# Patient Record
Sex: MALE | Employment: UNEMPLOYED | URBAN - METROPOLITAN AREA
[De-identification: names, ages, dates, MRNs, and addresses within clinical notes are randomized per-mention and may not be internally consistent; named-entity substitution may affect disease eponyms.]

---

## 2019-03-04 ENCOUNTER — DOCUMENTATION ONLY (OUTPATIENT)
Dept: CARE COORDINATION | Facility: CLINIC | Age: 54
End: 2019-03-04

## 2019-03-12 ENCOUNTER — TRANSFERRED RECORDS (OUTPATIENT)
Dept: HEALTH INFORMATION MANAGEMENT | Facility: CLINIC | Age: 54
End: 2019-03-12

## 2019-04-02 ENCOUNTER — TRANSFERRED RECORDS (OUTPATIENT)
Dept: HEALTH INFORMATION MANAGEMENT | Facility: CLINIC | Age: 54
End: 2019-04-02

## 2019-04-11 ENCOUNTER — TRANSFERRED RECORDS (OUTPATIENT)
Dept: HEALTH INFORMATION MANAGEMENT | Facility: CLINIC | Age: 54
End: 2019-04-11

## 2019-04-12 ENCOUNTER — TRANSFERRED RECORDS (OUTPATIENT)
Dept: HEALTH INFORMATION MANAGEMENT | Facility: CLINIC | Age: 54
End: 2019-04-12

## 2019-05-06 ENCOUNTER — OFFICE VISIT (OUTPATIENT)
Dept: INTERNAL MEDICINE | Facility: CLINIC | Age: 54
End: 2019-05-06
Payer: MEDICARE

## 2019-05-06 VITALS — SYSTOLIC BLOOD PRESSURE: 113 MMHG | HEART RATE: 72 BPM | DIASTOLIC BLOOD PRESSURE: 78 MMHG

## 2019-05-06 DIAGNOSIS — G91.8: ICD-10-CM

## 2019-05-06 DIAGNOSIS — G82.20 PARAPLEGIA (H): ICD-10-CM

## 2019-05-06 DIAGNOSIS — R53.81 MULTIFACTORIAL FUNCTIONAL IMPAIRMENT: ICD-10-CM

## 2019-05-06 DIAGNOSIS — Z86.19 HISTORY OF COCCIDIOIDOMYCOSIS: ICD-10-CM

## 2019-05-06 DIAGNOSIS — Z76.89 ENCOUNTER TO ESTABLISH CARE WITH NEW DOCTOR: Primary | ICD-10-CM

## 2019-05-06 ASSESSMENT — ENCOUNTER SYMPTOMS
DISTURBANCES IN COORDINATION: 0
NECK PAIN: 1
DYSURIA: 0
FEVER: 0
MUSCLE CRAMPS: 1
POLYPHAGIA: 0
MYALGIAS: 1
LOSS OF CONSCIOUSNESS: 0
SPEECH CHANGE: 0
HALLUCINATIONS: 0
FLANK PAIN: 0
NUMBNESS: 1
STIFFNESS: 1
POLYDIPSIA: 0
TREMORS: 0
BACK PAIN: 1
INCREASED ENERGY: 1
MUSCLE WEAKNESS: 1
HEADACHES: 0
WEIGHT GAIN: 0
CHILLS: 0
HEMATURIA: 0
SEIZURES: 0
WEAKNESS: 1
ALTERED TEMPERATURE REGULATION: 0
JOINT SWELLING: 0
DIZZINESS: 0
NIGHT SWEATS: 0
PARALYSIS: 1
MEMORY LOSS: 0
DIFFICULTY URINATING: 0
FATIGUE: 1
DECREASED APPETITE: 1
ARTHRALGIAS: 0
WEIGHT LOSS: 0
TINGLING: 1

## 2019-05-06 ASSESSMENT — PAIN SCALES - GENERAL: PAINLEVEL: SEVERE PAIN (7)

## 2019-05-06 NOTE — PROGRESS NOTES
CC:  Establish care/eval for motorized wheelchair    HPI:  Arnold is a 54 year old gentleman, whose primary care is at the [facility].  He formally served in the [employer] and remains VA-connected.  Per his report, the VA was not able to qualify him for a motorized wheelchair and advised him to seek primary care outside the [facility] for this concern. In addition to [facility]-care , he has Medicare for insurance.  His past medical history is notable for disseminated coccidioidomycosis prompting pronlonged hospitalization in the past [address] [place] [address]).  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  He also subsequently fractured his right femur during physical therapy.  This required surgical gale placement.  He has been using a manual wheelchair, however, he has developed progressive arm/shoulder/trunk weakness which makes it increasing difficult to use the manual wheelchair.      Answers for HPI/ROS submitted by the patient on 5/6/2019   General Symptoms: Yes  Skin Symptoms: No  HENT Symptoms: No  EYE SYMPTOMS: No  HEART SYMPTOMS: No  LUNG SYMPTOMS: No  INTESTINAL SYMPTOMS: No  URINARY SYMPTOMS: Yes  REPRODUCTIVE SYMPTOMS: No  SKELETAL SYMPTOMS: Yes  BLOOD SYMPTOMS: No  NERVOUS SYSTEM SYMPTOMS: Yes  MENTAL HEALTH SYMPTOMS: No  Fever: No  Loss of appetite: Yes  Weight loss: No  Weight gain: No  Fatigue: Yes  Night sweats: No  Chills: No  Increased stress: Yes  Excessive hunger: No  Excessive thirst: No  Feeling hot or cold when others believe the temperature is normal: No  Loss of height: No  Post-operative complications: No  Surgical site pain: No  Hallucinations: No  Change in or Loss of Energy: Yes  Hyperactivity: No  Confusion: No  Trouble holding urine or incontinence: Yes  Pain or burning: No  Trouble starting or stopping: No  Increased frequency of urination: No  Blood in urine: No  Decreased frequency of urination:  No  Frequent nighttime urination: No  Flank pain: No  Difficulty emptying bladder: No  Back pain: Yes  Muscle aches: Yes  Neck pain: Yes  Swollen joints: No  Joint pain: No  Bone pain: No  Muscle cramps: Yes  Muscle weakness: Yes  Joint stiffness: Yes  Bone fracture: No  Trouble with coordination: No  Dizziness or trouble with balance: No  Fainting or black-out spells: No  Memory loss: No  Headache: No  Seizures: No  Speech problems: No  Tingling: Yes  Tremor: No  Weakness: Yes  Difficulty walking: Yes  Paralysis: Yes  Numbness: Yes      Patient Active Problem List   Diagnosis     Disseminated coccidioidomycosis     Chronic meningitis     Chronic osteomyelitis, osteomyelitis of unspecified site     Cauda equina syndrome (H)     Depression with anxiety     Psychophysiological insomnia     Sacral pressure sore, unspecified pressure ulcer stage     Hydrocephalus     Low back pain without sciatica, unspecified back pain laterality     Benign non-nodular prostatic hyperplasia without lower urinary tract symptoms     Opioid dependence with opioid-induced disorder (H)     Paraplegia (H)     Pressure ulcer of left buttock, stage 4 (H)     Past medical history  Problem Noted Date   Decubitus ulcer of coccyx, unspecified pressure ulcer stage 02/24/2016   Thrombus 02/22/2016   Deep vein thrombosis (DVT), left 02/20/2016   Cellulitis of leg, right 02/19/2016   Closed displaced fracture of epiphysis of right femur with nonunion 10/16/2015   Coccidioidomycosis 08/31/2015   Paraplegia 08/31/2015   Paralysis 05/04/2015   Major depression, recurrent 05/04/2015   Pressure ulcer 05/04/2015   Cauda equina syndrome with neurogenic bladder 05/04/2015   Multifactorial functional impairment 05/04/2015   Sepsis 05/04/2015   Severe protein-calorie malnutrition 05/04/2015   Osteomyelitis 05/04/2015   Pyelonephritis 05/04/2015     Other past medical history:  Problem Noted Date   Osteomyelitis of sacrum 03/16/2015   Sepsis 03/13/2015    Malnutrition 03/12/2015   Overview:     Malnutrition, severe protein and calorie     SIRS (systemic inflammatory response syndrome) 03/12/2015   Low back pain 10/06/2014   Palliative care encounter 10/06/2014   Overview:     Palliative care encounter-Pain     Major depressive disorder, recurrent 10/05/2014   Cachexia 10/05/2014   Mild protein-calorie malnutrition 10/05/2014   Acute pyelonephritis 10/03/2014   Overview:     Acute pyelonephritis, Left, Probable     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 On lifelong   antifungal treatment. Neurogenic bowel and bladder.        Cauda equina syndrome 09/30/2014   Flaccid paraplegia 09/30/2014   Pressure ulcer,stage IV, down to muscle or bone 09/30/2014   Overview:     ischium       Pressure sore on heel, left, unstageable 09/30/2014   Overview:     Pressure sore on heels bilaterally     Pressure sore on heel, right, unstageable 09/30/2014   Abdominal pain 09/30/2014   Stage 4 pressure ulcer 08/01/2014   Overview:     Stage 4 pressure ulcer of the sacrum and the left ischial tuberosity     Chronic abdominal pain 08/01/2014   Cauda equina syndrome with neurogenic bladder 08/01/2014   Neurogenic bowel 08/01/2014   Systemic fungal infection 08/01/2014   Chronic brain-hydrocephalus syndrome 08/01/2014   Spasticity 08/01/2014   Depression 08/01/2014   Anemia, chronic disease 08/01/2014   Spastic paraplegia 08/01/2014       Surgical History  Surgery Date Site/Laterality Comments   IM FIONA FEMUR 10/18/2015 Leg Upper/Right Procedure: IM FIONA FEMUR; Laterality: Right; Surgeon: Asa Talley MD; Service: Orthopedics    Medical devices from this surgery are in the Implants section.     Other surgical history:  Surgery Date  Laterality Comments   ABDOMEN SURGERY         GASTROSTOMY TUBE PLACEMENT         BRAIN SURGERY         VENTRICULOPERITONEAL SHUNT        Family History:  Medical History Relation Name Comments   Cancer, Lung Birth Father       Cataract Negative Family History       Glaucoma Negative Family History       Macular Degeneration Negative Family History       Retinal Detachment Negative Family History       Mother diabetes  1 brother  1 sister      Relation Name Status Comments   Birth Father        Birth Mother   Alive        Social History:  4 children, 3 grandkids,   Formally employed as excecutive admin support for [employer]  No pork or red meat  Served in the [employer], 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    Never Smoker             Alcohol Use Drinks/Week oz/Week Comments   No        Current Outpatient Medications   Medication Sig Dispense Refill     gabapentin (NEURONTIN) 400 MG capsule Take 1 capsule (400 mg) by mouth 2 times daily 90 capsule      oxyCODONE HCl (OXAYDO) 5 MG TABA Take 10 mg by mouth 3 times daily       VITAMIN D, CHOLECALCIFEROL, PO Take 1,000 Units by mouth daily          No Known Allergies  /78   Pulse 72     Gen:  Pleasant, NAD, in manual wheelchair  Musculoskeletal/Neuro exam:     strength intact.  Biceps, triceps and shoulder abduction strength are difficult to fully assess:  He can attempt the testing and has decent strength, however, he has to lean to the side of testing and loses trunk stability readily when testing.  Light touch and vibratory sensation are intact in the UE's  and Reflexes 1+ upper extremities.  Absent ability to move bilateral LE's.  I could not elicit reflexes in the LE's.      Arnold was seen today for establish care.    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  Increasing trunk  instability and upper extremity weakness   -     PHYSICAL THERAPY REFERRAL for mobility assessment for motorized wheelchair.    Sylvie Peralta M.D.  Internal Medicine  [facility]   pager [phone]

## 2019-07-08 ENCOUNTER — OFFICE VISIT (OUTPATIENT)
Dept: NEUROLOGY | Facility: CLINIC | Age: 54
End: 2019-07-08
Payer: MEDICARE

## 2019-07-08 DIAGNOSIS — G82.20 PARAPLEGIA (H): Primary | ICD-10-CM

## 2019-07-08 NOTE — LETTER
7/8/2019       RE: Arnold Duke  [address]  [facility] [address]     Dear Colleague,    Thank you for referring your patient, Arnold Duke, to the [facility] EMG at [facility]. Please see a copy of my visit note below.     [facility]  Electrodiagnostic Laboratory    Nerve Conduction & EMG Report          Patient: Arnold Duke YOB: 1965  Patient ID: [id number] Age: 54 Years 5 Months  Gender: Male      History & Examination:  Mr. Duke is scheduled for bilateral lower extremity EM[facility] [place] to assess whether there is any motor neuron function in the setting of paraplegia that would suggest he is a candidate for spinal stimulator.    The patient reports that he sustained a spinal cord injury and cauda equina injury related to a disseminated coccidioidomycosis infection.    On examination the patient is observed to have no movement in the lower extremities.  He is diffusely atrophic in the lower extremities and reports some altered sensations in the lower extremities but it is unreliable on testing.    Techniques: Motor conduction studies were done with surface recording electrodes. Sensory conduction studies were performed with surface electrodes. Temperature was monitored and recorded throughout the study. Lower extremities were maintained at a temperature of 31degrees Centigrade or higher. EMG was done with a concentric needle electrode.        Results:  Sensory and motor nerve conduction studies were attempted in the bilateral lower extremities with no response elicited from any of the studies.    Needle examination was performed.  In the left tibialis anterior there was increased insertional activity with 2+ fibrillations present.  There was no ability for the patient to activate this muscle.  In the left vastus lateralis patient had 1+ fibrillation potentials with a CRD present and  again no ability to activate muscles for insertional activity.  Left gastrocnemius and iliopsoas showed normal insertional activity with no activation.    Interpretation:  This is an abnormal study. Sensory and motor nerve conduction studies today elicited no response.  With needle examination there was some minimal insertional abnormalities with the patient was unable to activate any muscles.  Whether this is representative of severe lower motor neuron injury only or represents additional central nervous system injury and inability to activate musculature is unclear and clinical correlation would be recommended.  Per the clinical question at hand for the EMG I do not appreciate any significant motor neuron function on this examination.      EMG Physician:  Grace Kraft MD FAAN    Sensory NCS      Nerve / Sites Rec. Site Onset Peak NP Amp Ref. PP Amp Dist Salvatore Ref. Temp     ms ms  V  V  V cm m/s m/s  C   L SURAL - Lat Mall      Calf Ankle NR NR NR 8.0 NR 14 NR 38.0 31.1   R SURAL - Lat Mall      Calf Ankle NR NR NR 8.0 NR 14 NR 38.0 31.4   R SUP PERONEAL      Lat Leg Patel NR NR NR  NR 12.5 NR 38.0 31.4       Motor NCS      Nerve / Sites Rec. Site Lat Ref. Amp Ref. Rel Amp Dist Salvatore Dur. Area Temp.     ms ms mV mV % cm m/s ms %  C   R DEEP PERONEAL - EDB      Ankle EDB NR 6.00 NR 2.5 NR 8  NR NR 31.4   L DEEP PERONEAL - EDB      Ankle EDB NR 6.00 NR 2.5 NR 8  NR NR 31.1   R TIBIAL - AH      Ankle AH NR 6.00 NR 4.0 NR 8  NR NR 23.8   L TIBIAL - AH      Ankle AH NR 6.00 NR 4.0 NR 8  NR NR 23.8   R PERONEAL - Tib Ant      Fib Head Tib Ant NR  NR  NR   NR NR 23.8       EMG Summary Table     Spontaneous MUAP Recruitment    IA Fib/PSW Fasc H.F. Amp Dur. PPP Pattern   L. TIB ANTERIOR Increased 2+ None None - - - None   L. GASTROCN (MED) N None None None - - - None   L. VAST LATERALIS N 1+ None CRD - - - None   L. ILIOPSOAS N None None None - - - None                           History & Examination:      Techniques:  Sensory  and motor conduction studies were done with surface recording electrodes. EMG was done with a concentric needle electrode.     Results:      Interpretation:        EMG Physician:               Again, thank you for allowing me to participate in the care of your patient.      Sincerely,    Grace Kraft MD

## 2019-07-08 NOTE — PROGRESS NOTES
[facility]  Electrodiagnostic Laboratory    Nerve Conduction & EMG Report          Patient: Arnold Duke YOB: 1965  Patient ID: [id number] Age: 54 Years 5 Months  Gender: Male      History & Examination:  Mr. Duke is scheduled for bilateral lower extremity EMG [facility] [place] to assess whether there is any motor neuron function in the setting of paraplegia that would suggest he is a candidate for spinal stimulator.    The patient reports that he sustained a spinal cord injury and cauda equina injury related to a disseminated coccidioidomycosis infection.    On examination the patient is observed to have no movement in the lower extremities.  He is diffusely atrophic in the lower extremities and reports some altered sensations in the lower extremities but it is unreliable on testing.    Techniques: Motor conduction studies were done with surface recording electrodes. Sensory conduction studies were performed with surface electrodes. Temperature was monitored and recorded throughout the study. Lower extremities were maintained at a temperature of 31degrees Centigrade or higher. EMG was done with a concentric needle electrode.        Results:  Sensory and motor nerve conduction studies were attempted in the bilateral lower extremities with no response elicited from any of the studies.    Needle examination was performed.  In the left tibialis anterior there was increased insertional activity with 2+ fibrillations present.  There was no ability for the patient to activate this muscle.  In the left vastus lateralis patient had 1+ fibrillation potentials with a CRD present and again no ability to activate muscles for insertional activity.  Left gastrocnemius and iliopsoas showed normal insertional activity with no activation.    Interpretation:  This is an abnormal study. Sensory and motor nerve conduction studies today elicited no response.   With needle examination there was some minimal insertional abnormalities with the patient was unable to activate any muscles.  Whether this is representative of severe lower motor neuron injury only or represents additional central nervous system injury and inability to activate musculature is unclear and clinical correlation would be recommended.  Per the clinical question at hand for the EMG I do not appreciate any significant motor neuron function on this examination.      EMG Physician:  Grace Kraft MD FAAN    Sensory NCS      Nerve / Sites Rec. Site Onset Peak NP Amp Ref. PP Amp Dist Salvatore Ref. Temp     ms ms  V  V  V cm m/s m/s  C   L SURAL - Lat Mall      Calf Ankle NR NR NR 8.0 NR 14 NR 38.0 31.1   R SURAL - Lat Mall      Calf Ankle NR NR NR 8.0 NR 14 NR 38.0 31.4   R SUP PERONEAL      Lat Leg Patel NR NR NR  NR 12.5 NR 38.0 31.4       Motor NCS      Nerve / Sites Rec. Site Lat Ref. Amp Ref. Rel Amp Dist Salvatore Dur. Area Temp.     ms ms mV mV % cm m/s ms %  C   R DEEP PERONEAL - EDB      Ankle EDB NR 6.00 NR 2.5 NR 8  NR NR 31.4   L DEEP PERONEAL - EDB      Ankle EDB NR 6.00 NR 2.5 NR 8  NR NR 31.1   R TIBIAL - AH      Ankle AH NR 6.00 NR 4.0 NR 8  NR NR 23.8   L TIBIAL - AH      Ankle AH NR 6.00 NR 4.0 NR 8  NR NR 23.8   R PERONEAL - Tib Ant      Fib Head Tib Ant NR  NR  NR   NR NR 23.8       EMG Summary Table     Spontaneous MUAP Recruitment    IA Fib/PSW Fasc H.F. Amp Dur. PPP Pattern   L. TIB ANTERIOR Increased 2+ None None - - - None   L. GASTROCN (MED) N None None None - - - None   L. VAST LATERALIS N 1+ None CRD - - - None   L. ILIOPSOAS N None None None - - - None                           History & Examination:      Techniques:  Sensory and motor conduction studies were done with surface recording electrodes. EMG was done with a concentric needle electrode.     Results:      Interpretation:        EMG Physician:

## 2019-07-31 ENCOUNTER — HOSPITAL ENCOUNTER (OUTPATIENT)
Dept: OCCUPATIONAL THERAPY | Facility: CLINIC | Age: 54
Setting detail: THERAPIES SERIES
End: 2019-07-31
Attending: INTERNAL MEDICINE
Payer: MEDICARE

## 2019-07-31 PROCEDURE — 97542 WHEELCHAIR MNGMENT TRAINING: CPT | Mod: GO | Performed by: OCCUPATIONAL THERAPIST

## 2019-07-31 NOTE — PROGRESS NOTES
"   SEATING AND WHEELED MOBILITY ASSESSMENT  07/31/19 1400   Quick Adds   Quick Adds Certification;Current Manual Wheelchair   General Information    Rehab Discipline OT   Funding Medicare/MA   Service Outpatient;Occupational Therapy;Seating/Wheeled Mobility Evaluation   Height 5'9\"   Weight 170   Start Of Care Date 07/31/19   Referring Physician Sylvie Edmondson   Orders Per Therapist Evaluation;Evaluate And Treat As Indicated   Orders Date 05/06/19   Patient/Caregiver Goals Power mobility   Rehabilitation Technology Supplier Joel ALEXANDER from [facility]   Current Community Support Personal Care Attendant;Family/Friend Caregiver;Transportation Service;Therapy Services  (5 hrs/wk, PT and pain mgmt)   Patient role/Employment history Disabled  (will soon )   General Information [facility]   Fall Risk Screen   Fall screen completed by OT   Have you fallen 2 or more times in the past year? No   Is patient a fall risk? Yes   Medical History   Onset Of Illness/injury Or Date Of Surgery 5/6/19  (order)   Medical Diagnosis Coccidioidomycosis.  (valley fever) with resulting paraplegia   Medical History Stage 4 pressure wounds with bilateral flap surgeries, hydrocephalus, depression, anxiety   Recent or Planned Surgeries flap surgeries   Current Manual Wheelchair   Age 3    Ti lite   Cushion Air Filled  (star)   Wheelchair Back Solid Curved   Footrest Style center mount   Headrest No   Settings Used Home;Outdoor Community Mobility;Primary Means of Transportation   Condition Good   Rationale for Equipment Changes Patient now requires power mobility due to upper extremity strain from long term power chair use.   Home Accessibility   Living Environment House   Primary Entrance Level;Elevator   All Rooms Wheelchair Accessible Yes   Community ADL   Transportation Transportation Services   Community Mobility Requirements Medical Appointments;Shopping   Cognitive/Visual/Hearing Status   Observations " No Problems Observed During Evaluation   Vision Intact;Corrective Lenses   ADL Status   Feeding Independent   Grooming/Hygiene Independent   Dressing Independent   Toileting Independent   Bathing Independent  (shower chair tub slide system)   Meal Preparation Requires Assist   Home Management Requires Assist   ADL Comments Standing frame- every day   Fatigue   Fatigue Measure Score Increased fatigue with longer distances and full time chair use   Balance   Unsupported Sitting Balance Within Functional Limits   Sitting Balance in Chair Uses Upper Extremities for Balance   Ambulation   Ambulation Non Ambulatory   Transfers   Transfer Assist Independent   Transfer Method Squat Pivot/Scoot Boost   Sleep/Rest   Sleep Surface/Equipment Adjustable hospital bed   Wheelchair Ability   Wheelchair Ability Quick Adds Manual Chair;Wheelchair Use   Manual Wheelchair Propulsion   Manual Wheelchair Propulsion Independent   Comments increased pain and fatigued with long term propulsion   Wheelchair Use   Ability to Perform Weight Shifts Independent   Bed Confined Without Wheelchair? Yes   Hours in Wheelchair Daily 5   Hours Spent Alone Daily 24   Neuromuscular   History of Pressure Sores Yes   Pain Yes   Pain Location waist down   Pain Scale  6  (pain psychology, stimulator placement)   Coordination UE Intact;LE Impaired   Tone Spasticity   Sensory Deficits Reported T5 down impaired and worse towards feet   Sensation on Seating Surface Impaired per Report   Head and Neck   Head and Neck Position Functional   Upper Extremities   UE ROM Full ROM   UE Strength 4+/5   Dominance Right   UE Comments Pain and fatigue with shoulders with use of manual chair   Pelvis   Anterior/Posterior Pelvis Position Posterior Tilt;Partially Flexible   Pelvic Obliquity Left Elevation   Trunk   Anterior/Posterior Trunk Position Increased Lumbar Lordosis   Left-Right Trunk Position S Curve   Lower Extremities   LE ROM Full passive    LE Strength 0/5   Foot  Positioning Plantar flexed   Patient Measurements   Other see atp notes   Education Assessment   Barriers to Learning Emotional   Preferred Learning Style Listening;Demonstration   Assessment/Plan   Criteria for Skilled Interventions Met Yes, Treatment Indicated   Treatment Diagnosis impaired participation in MRADLS   Therapy Frequency once   Planned Therapy Interventions Wheelchair Management/Propulsion Training   Planned Therapy Interventions Comments Educated patient of power mobility and scooter and power assist vs power wheelchair with tilt to manage trunk stability and wounds.  Patient wanting to trial power w/c with smallest based possible   Risks and benefits of treatment have been explained Yes   Patient/family & other staff in agreement with plan of care Yes   Comments Trials at [facility] of power chair.   Session Time   OT Wheelchair Management Minutes (54516) 75   Certification   Certification date from 07/31/19   Certification date to 07/31/19   Adult OT Eval Goals   OT Eval Goals (Adult) 1    OT Goal 1   Goal Identifier power w/c   Goal Description Patient to demonstrate a successful home trial with the recommended equipment   Target Date 07/31/19   Date Met 07/31/19   Electronically signed by:  Mely TREVINO/L, ATP      Occupational Therapist, Assistive   [phone]      fax: [phone]      [email or web address], [address]  [address]  [place] [address]

## 2021-09-07 ENCOUNTER — OFFICE VISIT (OUTPATIENT)
Dept: INTERNAL MEDICINE | Facility: CLINIC | Age: 56
End: 2021-09-07
Payer: MEDICARE

## 2021-09-07 VITALS — SYSTOLIC BLOOD PRESSURE: 115 MMHG | DIASTOLIC BLOOD PRESSURE: 74 MMHG | HEART RATE: 66 BPM

## 2021-09-07 DIAGNOSIS — G82.20 PARAPLEGIA (H): Primary | ICD-10-CM

## 2021-09-07 PROCEDURE — 99213 OFFICE O/P EST LOW 20 MIN: CPT | Mod: GC

## 2021-09-07 ASSESSMENT — PAIN SCALES - GENERAL: PAINLEVEL: NO PAIN (0)

## 2021-09-07 NOTE — PROGRESS NOTES
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 He has been using a manual wheelchair since, was referred for motorized wheelchair but found it to be bulky. He presents to clinic for referral for physical therapy of his lower extremities. He has no concerns today and states that he wants to continue working on his remaining strength in his lower extremities. He denies chest pain, dyspnea, abdominal pain, diarrhea, constipation, and nausea.    Medications and allergies reviewed by me today.     ROS:   Constitutional, neuro, ENT, endocrine, pulmonary, cardiac, gastrointestinal, genitourinary, musculoskeletal, integument and psychiatric systems are negative, except as otherwise noted.    OBJECTIVE:    /74   Pulse 66    Wt Readings from Last 1 Encounters:   10/14/16 69.2 kg (152 lb 9.6 oz)       GENERAL APPEARANCE: healthy, alert and no distress. Seated in wheelchair     EYES: EOMI,  PERRL     HENT: ear canals and TM's normal and nose and mouth without ulcers or lesions     NECK: no adenopathy, no asymmetry, masses, or scars and thyroid normal to palpation     RESP: lungs clear to auscultation - no rales, rhonchi or wheezes     CV: regular rates and rhythm, normal S1 S2, no S3 or S4 and no murmur, click or rub     ABDOMEN:  soft, nontender, no HSM or masses and bowel sounds normal     MS: upper extremities normal- no gross deformities noted, no evidence of inflammation in joints, FROM in all extremities. Lower extremities with near-flaccid paralysis     SKIN: no suspicious lesions or rashes     NEURO: Normal strength and tone in upper extremities, decreased in lower  extremities, decreased sensory exam in lower extremities, mentation intact and speech normal     PSYCH: mentation appears normal. and affect normal/bright     LYMPHATICS: No cervical adenopathy     ASSESSMENT/PLAN:  Arnold Duke is a 56 year old male with a past medical history is notable for disseminated coccidioidomycosis prompting pronlonged hospitalization in the past (2006, [place]). Presents to clinic for physical therapy referral.     Paraplegia (H)  - Physical therapy referral    Diego Swain Jr., MD  Internal Medicine Resident, PGY-1  [facility]  Sep 7, 2021    Pt was seen and plan of care discussed with Dr. Peralta

## 2021-09-07 NOTE — NURSING NOTE
Chief Complaint   Patient presents with     Referral     PT referral       Mike Boyle CMA ([facility]) at 9:21 AM on 9/7/2021

## 2021-09-13 ENCOUNTER — HOSPITAL ENCOUNTER (OUTPATIENT)
Dept: PHYSICAL THERAPY | Facility: CLINIC | Age: 56
Setting detail: THERAPIES SERIES
End: 2021-09-13
Attending: INTERNAL MEDICINE
Payer: MEDICARE

## 2021-09-13 DIAGNOSIS — G82.20 PARAPLEGIA (H): ICD-10-CM

## 2021-09-13 PROCEDURE — 97161 PT EVAL LOW COMPLEX 20 MIN: CPT | Mod: GP | Performed by: PHYSICAL THERAPIST

## 2021-09-13 PROCEDURE — 97110 THERAPEUTIC EXERCISES: CPT | Mod: GP | Performed by: PHYSICAL THERAPIST

## 2021-09-13 NOTE — PROGRESS NOTES
"   09/13/21 1200   Quick Adds   Quick Adds Certification   Type of Visit Initial OP PT Evaluation   General Information   Start of Care Date 09/13/21   Referring Physician Diego Swain Jr., MD - resident; Vivian Silva MD   Orders Evaluate and Treat as Indicated   Order Date 09/07/21   Medical Diagnosis Paraplegia (H) G82.20   Onset of illness/injury or Date of Surgery 09/07/21  (Referral date used)   Precautions/Limitations fall precautions   Surgical/Medical history reviewed Yes   Pertinent history of current problem (include personal factors and/or comorbidities that impact the POC) Per chart review: 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 He has been using primarily a manual wheelchair since 2013/2014, was referred for motorized wheelchair but found it to be bulky.\" He would like to continue working on [employer] strength. Patient notes he is able to stand with his walker and he has a standing frame that he uses daily for about 1 hour per day cummulatively. Patient is able to transfer independently. Patient notes he can standing and walking a few steps with his walker, but not a lot for safety. Patient denies falls. He notes some increase in weakness lately, wants to also maintain what he has. Patient uses dumbbells for his arms. Patient denies pain or falls.   Pertinent Visual History  Glasses   Prior level of function comment Patient was previously IND with all functional mobility and ADLs with no AD use. Currently he is modified independent with use of a manual wheelchair for primary mobility. Patient notes he has some core instability with showering - missing the back rest of the bench. Drives with hand control modification. Patient notes some difficulty with core control when turning in " the car. He is able to get the wheelchair in his car by dismantling it.   Current Community Support Family/friend caregiver   Patient role/Employment history Disabled   Living environment Apartment/condo   Home/Community Accessibility Comments Lives alone in a 4th floor apartment with elevator access   Current Assistive Devices Manual Wheel Chair  (2014)   Assistive Devices Comments Has a hospital bed but does not use it, has a walker and standing frame   Patient/Family Goals Statement Improve strength in legs and core   Fall Risk Screen   Fall screen completed by PT   Have you fallen 2 or more times in the past year? No   Have you fallen and had an injury in the past year? No   Timed Up and Go score (seconds) NT, nonambulatory   Is patient a fall risk? Yes   Pain   Patient currently in pain No   Vitals Signs   Heart Rate 72   SpO2 99   Blood Pressure 121/79   Vital Signs Comments Seated, resting, left arm   Cognitive Status Examination   Orientation orientation to person, place and time   Level of Consciousness alert   Follows Commands and Answers Questions 100% of the time;able to follow multistep instructions   Personal Safety and Judgment intact   Integumentary   Integumentary No deficits were identified   Posture   Posture Forward head position   Posture Comments Sits slumped with left trunk sidebend for balance without back support   Strength   Manual Muscle Testing Quick Adds MMT: Hip;MMT: Knee;MMT: Ankle   Strength Comments MMT performed in sitting   MMT: Hip, Rehab Eval   Hip Flexion - Left Side (1/5) trace, left   Hip ABduction - Left Side (1/5) trace, left   Hip ADduction - Left Side (1/5) trace, left   Hip Flexion - Right Side (1/5) trace, right   Hip ABduction - Right Side (1/5) trace, right   Hip ADduction - Right Side (1/5) trace, right   MMT: Knee, Rehab Eval   Knee Flexion - Left Side (1/5) trace, left   Knee Extension - Left Side (1/5) trace, left   Knee Flexion - Right Side (0/5) zero, right    Knee Extension - Right Side (1/5) trace, right   MMT: Ankle, Rehab Eval   Ankle Dorsiflexion - Left Side (0/5) zero, left   Ankle Plantarflexion - Left Side (0/5) zero, left   Ankle Dorsiflexion - Right Side (0/5) zero, right   Ankle Plantarflexion - Right Side (0/5) zero, right   Bed Mobility   Bed Mobility Comments IND   Transfer Skills   Transfer Comments Able to perform a scoot transfer independently, able to toilet independently   Locomotion   Wheel Chair Mobility Not impaired   Wheel Chair Mobility Comments Mod I   Gait   Gait Comments Non ambulatory; able to take some small steps per patient but largely non LE weight bearing with standing in the parallel bars today   Balance   Balance Comments Sitting balance limited with shift of his COM over his COREY, notes he frequently falls back into bed if he pushes his balance too far; Function in sitting test (FIST): 25/56 with LOB on lateral nudge and picking up an object from behind. Max assist for lifting his foot. Did not attempt forward reach or  object from the floor; Patient able to maintain standing posture with primarily B UE support and limited weight bearing through his feet, poor hip and knee extension improved with therapist bilateraly knee block and facilitation of hip extension. Able to hold for about 20 seconds to UE fatigue, notes quad fatigue bilaterally after standing.    Sensory Examination   Sensory Perception Comments Tingling in his legs from the upper thigh down to his toes, symmetrical, sensation intact but different per patient report.    Modality Interventions   Planned Modality Interventions Comments Per therapist discretion   Planned Therapy Interventions   Planned Therapy Interventions balance training;ADL retraining;gait training;neuromuscular re-education;motor coordination training;orthotic fitting/training;ROM;strengthening;stretching;transfer training   Clinical Impression   Criteria for Skilled Therapeutic Interventions Met  yes, treatment indicated   PT Diagnosis Impaired core strength and sitting balance   Influenced by the following impairments Impaired core and LE strength, impaired sitting balance, PMH, impaired activity tolerance   Functional limitations due to impairments Increased risk for falls per the FIST, impaired safety and independence with sitting balance, turning the steering wheel, and sitting without back support due to impaired core strength and sitting balance   Clinical Presentation Stable/Uncomplicated   Clinical Presentation Rationale Medically stable but complex PMH    Clinical Decision Making (Complexity) Low complexity   Therapy Frequency 1 time/week  (Decreasing in frequency as indicated)   Predicted Duration of Therapy Intervention (days/wks) 90 days   Risk & Benefits of therapy have been explained Yes   Patient, Family & other staff in agreement with plan of care Yes   Clinical Impression Comments Patient is a 56 year old male with a PMH of disseminated coccidioidomycosis and resulting paraplegia. Patient is primarily manual wheelchair dependent and completes scoot transfers independently. He demonstrates impaired core strength and sitting balance, especially without back support or when needing to apply force through his arms such as when turning a car steering wheel. He may benefit from skilled physical therapy to decrease his risk for falls, increase his LE strength, and increase his safety and independence with functional mobility and ADLs.    GOALS   PT Eval Goals 1;2;3;4   Goal 1   Goal Identifier HEP   Goal Description Patient will demonstrate understanding and compliance to his HEP for continued wellbeing upon discharge from skilled physical therapy.   Target Date 12/11/21   Goal 2   Goal Identifier Function in sitting test   Goal Description Patient will complete the Function In Sitting Test with a score of 32/56 to demonstrate improved safety and independence with sitting balance tasks.   Goal  Progress Baseline 25/56   Target Date 12/11/21   Goal 3   Goal Identifier Car steering wheel   Goal Description Patient will demonstrate ability to maintain upright posture and core stabilization to turn the steering wheel in his car for safety and independence with driving.    Goal Progress Basline difficulty maintaining core stabilization with turning the steering wheel   Target Date 12/11/21   Goal 4   Goal Identifier Standing   Goal Description Patient will maintain standing with B UE support and weight supported on B LEs for 2 minutes to demonstrate improved strength and upright standing tolerance.    Goal Progress Basline about 20 seconds with B knee block   Target Date 12/11/21   Total Evaluation Time   PT Eval, Low Complexity Minutes (67388) 35   Therapy Certification   Certification date from 09/13/21   Certification date to 12/11/21   Medical Diagnosis Paraplegia (H) G82.20   Certification I certify the need for these services furnished under this plan of treatment and while under my care.  (Physician co-signature of this document indicates review and certification of the therapy plan).

## 2021-09-13 NOTE — PROGRESS NOTES
Rehabilitation Services        OUTPATIENT PHYSICAL THERAPY FUNCTIONAL EVALUATION  PLAN OF TREATMENT FOR OUTPATIENT REHABILITATION  (COMPLETE FOR INITIAL CLAIMS ONLY)  Patient's Last Name, First Name, M.I.  YOB: 1965  Arnold Duke  DONATO     Provider's Name   Vidhya Mancini PT   Medical Record No.  [id number]     Start of Care Date:  09/13/21   Onset Date:  09/07/21 (Referral date used)   Type:     _X__PT   ____OT  ____SLP Medical Diagnosis:  Paraplegia (H) G82.20     PT Diagnosis:  Impaired core strength and sitting balance Visits from SOC:  1                              __________________________________________________________________________________  Plan of Treatment/Functional Goals:  balance training, ADL retraining, gait training, neuromuscular re-education, motor coordination training, orthotic fitting/training, ROM, strengthening, stretching, transfer training           GOALS  HEP  Patient will demonstrate understanding and compliance to his HEP for continued wellbeing upon discharge from skilled physical therapy.  12/11/21    Function in sitting test  Patient will complete the Function In Sitting Test with a score of 32/56 to demonstrate improved safety and independence with sitting balance tasks.  12/11/21    Car steering wheel  Patient will demonstrate ability to maintain upright posture and core stabilization to turn the steering wheel in his car for safety and independence with driving.   12/11/21    Standing  Patient will maintain standing with B UE support and weight supported on B LEs for 2 minutes to demonstrate improved strength and upright standing tolerance.   12/11/21                                                Therapy Frequency:  1 time/week (Decreasing in frequency as indicated)   Predicted Duration of Therapy Intervention:  90 days    Vidhya Mancini, PT, DPT                                    I CERTIFY THE NEED FOR THESE SERVICES FURNISHED UNDER        THIS PLAN OF TREATMENT AND WHILE UNDER MY CARE     (Physician co-signature of this document indicates review and certification of the therapy plan).                Certification Date From:  09/13/21   Certification Date To:  12/11/21    Referring Provider:  Diego Swain Jr., MD - resident; Vivian Silva MD    Initial Assessment  See Epic Evaluation- Start of Care Date: 09/13/21

## 2021-09-23 ENCOUNTER — HOSPITAL ENCOUNTER (OUTPATIENT)
Dept: PHYSICAL THERAPY | Facility: CLINIC | Age: 56
Setting detail: THERAPIES SERIES
End: 2021-09-23
Payer: MEDICARE

## 2021-09-23 ENCOUNTER — TELEPHONE (OUTPATIENT)
Dept: INTERNAL MEDICINE | Facility: CLINIC | Age: 56
End: 2021-09-23

## 2021-09-23 PROCEDURE — 97110 THERAPEUTIC EXERCISES: CPT | Mod: GP | Performed by: PHYSICAL THERAPIST

## 2021-09-23 NOTE — TELEPHONE ENCOUNTER
[facility] Call Center    Phone Message    May a detailed message be left on voicemail: yes     Reason for Call: Other: Pt requesting call back to discuss getting a handicap placard for his vehicle     Action Taken: Message routed to:  [facility] naveen

## 2021-09-29 NOTE — TELEPHONE ENCOUNTER
I called and left message.  willing to sign. He can get the form online and mail or drop off once he finishes his portion.  Giuliana Rushing, EMT at 9:51 AM on 9/29/2021.  [facility]  [facility]  [place]  [phone]

## 2021-10-14 ENCOUNTER — HOSPITAL ENCOUNTER (OUTPATIENT)
Dept: PHYSICAL THERAPY | Facility: CLINIC | Age: 56
Setting detail: THERAPIES SERIES
End: 2021-10-14
Attending: INTERNAL MEDICINE
Payer: MEDICARE

## 2021-10-14 PROCEDURE — 97110 THERAPEUTIC EXERCISES: CPT | Mod: GP | Performed by: PHYSICAL THERAPIST

## 2021-11-02 ENCOUNTER — TELEPHONE (OUTPATIENT)
Dept: NEUROLOGY | Facility: CLINIC | Age: 56
End: 2021-11-02

## 2021-11-02 ENCOUNTER — HOSPITAL ENCOUNTER (OUTPATIENT)
Dept: PHYSICAL THERAPY | Facility: CLINIC | Age: 56
Setting detail: THERAPIES SERIES
End: 2021-11-02
Attending: INTERNAL MEDICINE
Payer: MEDICARE

## 2021-11-02 PROCEDURE — 97110 THERAPEUTIC EXERCISES: CPT | Mod: GP | Performed by: PHYSICAL THERAPIST

## 2021-11-02 NOTE — TELEPHONE ENCOUNTER
I called pt to discuss.  He has not been seen in Neurology.  Dr Kraft did an EMG but he has not had a visit with any of our neurologist.          [facility] Call Center    Phone Message    May a detailed message be left on voicemail: yes     Reason for Call: Other: Pt called reporting that he needs new remote for stimulator. Please call Pt back to advise how to get new remote.    Action Taken: Message routed to:  [facility] Neurology    Travel Screening: Not Applicable

## 2021-11-08 ENCOUNTER — TELEPHONE (OUTPATIENT)
Dept: INTERNAL MEDICINE | Facility: CLINIC | Age: 56
End: 2021-11-08
Payer: MEDICARE

## 2021-11-08 NOTE — TELEPHONE ENCOUNTER
Called and left patient VM.    1. Patient pcp, Dr. Peralta has left our clinic since 10/01/21.  We can find a provider who is willing to sign off ipod form.  In future, patient will need to schedule a re-establish care for a new pcp.  2. Patient will need a face-to-face visit for the OT Eval/Asses for a motorized wheelchair per insurance to cover.  Please schedule face -to-face visit (in-person or video only) with any providers available.      Mike Boyle CMA ([facility]) at 4:04 PM on 11/8/2021

## 2021-11-08 NOTE — TELEPHONE ENCOUNTER
[facility] Call Center    Phone Message    May a detailed message be left on voicemail: yes     Reason for Call: Other: Patient called in regarding a few items. He needs a new ipod for his spinal stimulator and [employer] was sending over a form that needs to be completed. He was also asking about getting into OT to get evaluated for a new wheelchair.     Action Taken: Message routed to:  [facility]    Travel Screening: Not Applicable

## 2021-11-09 ENCOUNTER — HOSPITAL ENCOUNTER (OUTPATIENT)
Dept: PHYSICAL THERAPY | Facility: CLINIC | Age: 56
Setting detail: THERAPIES SERIES
End: 2021-11-09
Attending: INTERNAL MEDICINE
Payer: MEDICARE

## 2021-11-09 PROCEDURE — 97110 THERAPEUTIC EXERCISES: CPT | Mod: GP | Performed by: PHYSICAL THERAPIST

## 2021-11-15 ENCOUNTER — TELEPHONE (OUTPATIENT)
Dept: INTERNAL MEDICINE | Facility: CLINIC | Age: 56
End: 2021-11-15
Payer: MEDICARE

## 2021-11-15 NOTE — CONFIDENTIAL NOTE
Dina Barrett another message to make an appointment either video or in person to see  to complete this form.      Form placed in 's in box.    Dinah Ventura on 11/15/2021 at 2:46 PM

## 2021-11-15 NOTE — TELEPHONE ENCOUNTER
[facility] Call Center    Phone Message    May a detailed message be left on voicemail: yes     Reason for Call: Order(s): Other:  Stimulator replacement  Reason for requested: needs to be replaced  Date needed: as soon as possible  Provider name: Dr. Vivian Avalos    Patient calling again and requesting stimulator replacement order is signed. Patient has been calling for the past couple weeks and requested a call back for a status update.  confirmed the correct fax number for the order to be sent.    Please call patient back to discuss further.    Fax #: [phone]      Action Taken: Message routed to:  [facility]    Travel Screening: Not Applicable

## 2021-11-18 ENCOUNTER — MEDICAL CORRESPONDENCE (OUTPATIENT)
Dept: HEALTH INFORMATION MANAGEMENT | Facility: CLINIC | Age: 56
End: 2021-11-18
Payer: MEDICARE

## 2021-11-29 ENCOUNTER — VIRTUAL VISIT (OUTPATIENT)
Dept: INTERNAL MEDICINE | Facility: CLINIC | Age: 56
End: 2021-11-29
Payer: MEDICARE

## 2021-11-29 ENCOUNTER — TELEPHONE (OUTPATIENT)
Dept: INTERNAL MEDICINE | Facility: CLINIC | Age: 56
End: 2021-11-29

## 2021-11-29 DIAGNOSIS — Z00.00 ROUTINE GENERAL MEDICAL EXAMINATION AT A HEALTH CARE FACILITY: ICD-10-CM

## 2021-11-29 DIAGNOSIS — Z12.5 ENCOUNTER FOR SCREENING FOR MALIGNANT NEOPLASM OF PROSTATE: ICD-10-CM

## 2021-11-29 DIAGNOSIS — G82.20 PARAPLEGIA (H): Primary | ICD-10-CM

## 2021-11-29 PROCEDURE — 99214 OFFICE O/P EST MOD 30 MIN: CPT | Mod: 95 | Performed by: INTERNAL MEDICINE

## 2021-11-29 NOTE — TELEPHONE ENCOUNTER
"Pt calling stating he got a call from \"health services\" and he would like to know what it is about. pls call back pt to discuss.  "

## 2021-11-29 NOTE — NURSING NOTE
Arnold Duke is a 56 year old male patient who is being evaluated via a billable video visit. The patient was called and checked-in for today's virtual visit. After the patient was checked in, Arnold Duke's primary concerns for today's visit were discussed. The following are the primary complaints of the patient:     Chief Complaint   Patient presents with     Consult     Stimulator replacement;  becoming PCP; [facility] enrollment       The patient's allergies and medications were reviewed as noted. The patient does not have any other questions for the provider.    Isaías Connor, EMT at 12:12 PM on 11/29/2021.

## 2021-11-29 NOTE — PROGRESS NOTES
Arnold is a very pleasant 56 year old who is being evaluated via a billable video visit.          Subjective   Arnold presents for the following health issues: mobility and wheelchair issues.     HPI     Pain is well managed, currently; wants to continue using the stimul[facility].  I filled out a form for replacement of that device and he has received it and can manage it at home.   He is also continuing to take g[facility] and [facility].  Also needing a new wheelchair, as it is 6 years old now.  Continues to have paraplegia due to past fungal infection involving the spine (coccidioidomycosis). Gradually adding more physical exercise to maintain his strength.  His mobility has improved compared to when he first came home from the hospital but still needs a wheelchair for activities of daily living.  He is interested in a more formal rehab program and I offered a referral to PM&R.    We also discussed routine health screening and he is due for labs and a PSA.  Had colonoscopy two years ago.         Review of Systems    ROS: 10 point ROS neg other than the symptoms noted above in the HPI.      Objective           Vitals:  No vitals were obtained today due to virtual visit.    Physical Exam   GENERAL: Healthy, alert and no distress  EYES: Eyes grossly normal to inspection.  No discharge or erythema, or obvious scleral/conjunctival abnormalities.  RESP: No audible wheeze, cough, or visible cyanosis.  No visible retractions or increased work of breathing.    SKIN: Visible skin clear. No significant rash, abnormal pigmentation or lesions.  NEURO: Cranial nerves grossly intact.  Mentation and speech appropriate for age.  PSYCH: Mentation appears normal, affect normal/bright, judgement and insight intact, normal speech and appearance well-groomed.      Arnold was seen today for follow up.    Paraplegia (H)  -     Occupational Therapy Referral; Future (seated mobility  assessment)  -     PHYSIATRY (PM&R) REFERRAL; Future    Routine general medical examination at a health care facility  -     PSA screen; Future  -     Comprehensive metabolic panel; Future  -     Lipid Profile FASTING; Future    Encounter for screening for malignant neoplasm of prostate   -     PSA screen; Future    RTC in 3-6 months or sooner jb Avalos MD          Video-Visit Details    Type of service:  Video Visit started 12:18 ended 12:35 with another 15 minutes chart review and documentation same day    Originating Location (pt. Location): Home    Distant Location (provider location):  [facility] INTERNAL MEDICINE [place]     Platform used for Video Visit: Frankly Chat

## 2021-11-29 NOTE — TELEPHONE ENCOUNTER
See 11/15/21 telephone encounter.  VM was left on 11/15 for patient to schedule either video or in-person appt with  for Simulator replacement order form.      Patient had a virtual appt with  today.      Mike Boyle CMA ([facility]) at 3:52 PM on 11/29/2021

## 2021-11-30 ENCOUNTER — TELEPHONE (OUTPATIENT)
Dept: INTERNAL MEDICINE | Facility: CLINIC | Age: 56
End: 2021-11-30
Payer: MEDICARE

## 2021-11-30 NOTE — TELEPHONE ENCOUNTER
Patient Is requesting a nurse follow up with him  to answer some questions about his device he needs programmed.     Please advise.

## 2021-12-01 ENCOUNTER — TELEPHONE (OUTPATIENT)
Dept: INTERNAL MEDICINE | Facility: CLINIC | Age: 56
End: 2021-12-01
Payer: MEDICARE

## 2021-12-01 NOTE — TELEPHONE ENCOUNTER
[facility] Call Center    Phone Message    May a detailed message be left on voicemail: yes     Reason for Call: Other: Moises is calling to get the most recent chart notes from when the wheelchair Eval was ordered, please review and fax to [phone] or call to address any questions or concerns thank you.      Action Taken: Message routed to:  [facility]    Travel Screening: Not Applicable

## 2021-12-02 NOTE — TELEPHONE ENCOUNTER
11/29/21 video visit notes faxed to number below.      Mike Boyle CMA ([facility]) at 7:14 AM on 12/2/2021

## 2021-12-06 NOTE — TELEPHONE ENCOUNTER
See my note below.    To sum it up: patient had a virtual appt with  on 11/29/21.   Order form for neurostimulator was filled, signed, and faxed.  No further appointment needed from patient.       Mike Boyle CMA ([facility]) at 10:12 AM on 12/6/2021

## 2021-12-06 NOTE — TELEPHONE ENCOUNTER
Pt requesting a call back from a nurse to discuss scheduling an appt for Simulator replacement. pls follow up.

## 2021-12-16 ENCOUNTER — TELEPHONE (OUTPATIENT)
Dept: INTERNAL MEDICINE | Facility: CLINIC | Age: 56
End: 2021-12-16
Payer: MEDICARE

## 2021-12-16 NOTE — TELEPHONE ENCOUNTER
"[facility] Call Center    Phone Message    May a detailed message be left on voicemail: yes     Reason for Call: Other: Patient calling and wondering about a program that was recommended to him. Patient said he believes it starts on 1/7/2022 and that it might be for PT. Patient also said it could be for \"PRIPPR\". Patient does not know what the acronym means.      Please call back to discuss.    Action Taken: Message routed to:  [facility]    Travel Screening: Not Applicable                                                                      "

## 2021-12-16 NOTE — TELEPHONE ENCOUNTER
Spoke to patient who needed clarification when his PM&R appointment was, as well as what exactly the appointment was. Clarified both questions for the patient. Aimee Montero LPN 12/16/2021 3:44 PM

## 2021-12-17 ENCOUNTER — HOSPITAL ENCOUNTER (OUTPATIENT)
Dept: PHYSICAL THERAPY | Facility: CLINIC | Age: 56
Setting detail: THERAPIES SERIES
End: 2021-12-17
Attending: INTERNAL MEDICINE
Payer: MEDICARE

## 2021-12-17 PROCEDURE — 97110 THERAPEUTIC EXERCISES: CPT | Mod: GP | Performed by: PHYSICAL THERAPIST

## 2021-12-30 ENCOUNTER — HOSPITAL ENCOUNTER (OUTPATIENT)
Dept: PHYSICAL THERAPY | Facility: CLINIC | Age: 56
Setting detail: THERAPIES SERIES
End: 2021-12-30
Attending: INTERNAL MEDICINE
Payer: MEDICARE

## 2021-12-30 PROCEDURE — 97110 THERAPEUTIC EXERCISES: CPT | Mod: GP | Performed by: PHYSICAL THERAPIST

## 2022-01-17 ENCOUNTER — OFFICE VISIT (OUTPATIENT)
Dept: PHYSICAL MEDICINE AND REHAB | Facility: CLINIC | Age: 57
End: 2022-01-17
Payer: MEDICARE

## 2022-01-17 VITALS — HEART RATE: 81 BPM | DIASTOLIC BLOOD PRESSURE: 76 MMHG | OXYGEN SATURATION: 100 % | SYSTOLIC BLOOD PRESSURE: 135 MMHG

## 2022-01-17 DIAGNOSIS — N31.9 NEUROGENIC BLADDER: ICD-10-CM

## 2022-01-17 DIAGNOSIS — G82.20 PARAPLEGIA (H): Primary | ICD-10-CM

## 2022-01-17 DIAGNOSIS — G83.4 CAUDA EQUINA SYNDROME (H): ICD-10-CM

## 2022-01-17 PROCEDURE — 99205 OFFICE O/P NEW HI 60 MIN: CPT | Performed by: PHYSICAL MEDICINE & REHABILITATION

## 2022-01-17 NOTE — LETTER
"2022       RE: Arnold Duke  [address]  [facility] [address]     Dear Colleague,    Thank you for referring your patient, Arnold Duke, to the [facility] PHYSICAL MEDICINE AND REHABILITATION CLINIC [place] at [facility]. Please see a copy of my visit note below.           PM&R Clinic Note     Patient Name: Arnold Duke : 1965 Medical Record: [id number]     Requesting Physician/clinician: Vivian Silva MD           History of Present Illness:     Arnold Duke is a 56 year old male who presented to our clinic for more evaluation of his rehab needs.    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 He told me today that the source was thought to be \"respiratory, and inhaling the fungus\". He had several months of hospitalization and rehab. Per chart review, he was in a LTC for a few years as well.       Symptoms,  --weakness in bilateral lower extremities and core muscles  --does dig stim every am and has regular BMs with no accidents during the day   --SIC x4-5/day using 14F coude cathter. Urine volume is 300ml per cath on average. He noted that he feels the urge to void and then does SIC based on that.   --some tightness in bilateral lower extremities in the morning   --gets spasms x2-3/week; not painful or bothersome   --paresthesia in bilateral lower extremities can be bothersome but not as much   --had some memory issue initially but thinks that he is back to his baseline       Meds,   Take gabapentin 400 daily; he is not sure if it's doing anything. He was on higher doses in the past (up to 9 capsules per day) but gradually tapered the dose down.   Tried baclofen and tizanidine in the past but discontinued due to no benefits or side effects.       Therapies/HEP/equipments,  Working with PT mainly on stretching. PT is on hold for now until more " "directions after this visit.   Seating eval was ordered and is pending.   He has a walker but doesn't use that due to risk of falls/   Uses his standing frame for 30-60 minutes every day       Functionally,   Mod I with all ADLs and mobility using his WC  Standing only in standing frame   No walking since 2006                 Past Medical and Surgical History:     No past medical history on file.  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  Right hand-dominant   Drives with hand control car            Family History:     No family history on file.         Medications:     Current Outpatient Medications   Medication Sig Dispense Refill     FLUCONAZOLE PO Take 5 mg by mouth daily       gabapentin (NEURONTIN) 400 MG capsule Take 1 capsule (400 mg) by mouth 2 times daily 90 capsule      VITAMIN D, CHOLECALCIFEROL, PO Take 1,000 Units by mouth daily               Allergies:     No Known Allergies           ROS:     A focused ROS is negative other than the symptoms noted above in the HPI.             Physical Examiniation:     VITAL SIGNS: /76   Pulse 81   SpO2 100%   BMI: Estimated body mass index is 22.54 kg/m  as calculated from the following:    Height as of 3/18/15: 1.753 m (5' 9\").    Weight as of 10/14/16: 69.2 kg (152 lb 9.6 oz).    Gen: NAD, pleasant and cooperative   Cardio: regular pulse  Pulm: non-labored breathing in " room air  Abd: benign  Ext: WWP, no edema in BLE, no tenderness in calves  Neuro/MSK:   Bilateral upper extremities with intact strength, sensation and reflexes  No volitional movement in bilateral lower extremities, with diminished sensation to LT (was not able to localize accurately with closed eyes) and no reflexes. Legs were flaccid with normal passive ROM at knees and ankles              Laboratory/Imaging:     NCS/EMG 7/2019  Interpretation:  This is an abnormal study. Sensory and motor nerve conduction studies today elicited no response.  With needle examination there was some minimal insertional abnormalities with the patient was unable to activate any muscles.  Whether this is representative of severe lower motor neuron injury only or represents additional central nervous system injury and inability to activate musculature is unclear and clinical correlation would be recommended.  Per the clinical question at hand for the EMG I do not appreciate any significant motor neuron function on this examination.                         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 At the very end of our visit he asked about his spinal stimulator and the new . Her was not sure where was this placed and who is the managing provider. He wasn't even sure about the exact indication of this invasive procedure and the timing of that. Please see my nurse's  separate note about her trying to solve the issue and connecting him with the vendor/primary team.     He also asked about any new [facility] with [facility]. I can contact Dr. Nichols and / or Dr. Mojica but I would like to have info about the device that he has first.     Spasticity is not an issue due to lower motor neuron type of his injury. No pain issue at this point. He actually wants to not take gabapentin any more. Neurogenic bowel and bladder are managed well. He is mod I with basic ADLs, mobility and driving. We meed more evaluation of his cognitive deficits but he seems to be coping well.     I got his signature and authorization [place] [facility] [place] but wasn't able to open most of the notes unfortunately. Should clarify if he needs f/u with ID team or not.        1. Patient education: In depth discussion and education was provided about the assessment and implications of each of the below recommendations for management. Patient indicated readiness to learn, all questions were answered and understanding of material presented was confirmed.    2. Work-up: none today    3. Therapy/equipment/braces: continue PT; will send a message to Vidhya to establish a HEP before discharging him. We can consider trial of e-stim to help with disuse atrophy but we don't have strong evidence for that. Has a new OT referral for seating eval which is pending.     4. Medications: no change     5. Interventions: none today     6. Referral / follow up with other providers: new referral to urology to establish care. He needs annual US and other work-up for h/o neurogenic bladder. Consider referral to Dr. Nichols [facility] clinic. Also consider neuropsych referral for more evaluation of his cognitive deficits.     7. Follow up: in 6 months     Carla Abdalla MD  Physical Medicine & Rehabilitation    60 minutes spent on the date of the encounter doing chart review, history and exam,  documentation and further activities as noted above        Again, thank you for allowing me to participate in the care of your patient.      Sincerely,    Carla Abdalla MD

## 2022-01-17 NOTE — PROGRESS NOTES
"       PM&R Clinic Note     Patient Name: Arnold Duke : 1965 Medical Record: [id number]     Requesting Physician/clinician: Vivian Silva MD           History of Present Illness:     Arnold Duke is a 56 year old male who presented to our clinic for more evaluation of his rehab needs.    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 He told me today that the source was thought to be \"respiratory, and inhaling the fungus\". He had several months of hospitalization and rehab. Per chart review, he was in a LTC for a few years as well.       Symptoms,  --weakness in bilateral lower extremities and core muscles  --does dig stim every am and has regular BMs with no accidents during the day   --SIC x4-5/day using 14F coude cathter. Urine volume is 300ml per cath on average. He noted that he feels the urge to void and then does SIC based on that.   --some tightness in bilateral lower extremities in the morning   --gets spasms x2-3/week; not painful or bothersome   --paresthesia in bilateral lower extremities can be bothersome but not as much   --had some memory issue initially but thinks that he is back to his baseline       Meds,   Take gabapentin 400 daily; he is not sure if it's doing anything. He was on higher doses in the past (up to 9 capsules per day) but gradually tapered the dose down.   Tried baclofen and tizanidine in the past but discontinued due to no benefits or side effects.       Therapies/HEP/equipments,  Working with PT mainly on stretching. PT is on hold for now until more directions after this visit.   Seating eval was ordered and is pending.   He has a walker but doesn't use that due to risk of falls/   Uses his standing frame for 30-60 minutes every day       Functionally,   Mod I with all ADLs and mobility using his WC  Standing only in standing frame   No walking since                  Past Medical and " "Surgical History:     No past medical history on file.  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  Right hand-dominant   Drives with hand control car            Family History:     No family history on file.         Medications:     Current Outpatient Medications   Medication Sig Dispense Refill     FLUCONAZOLE PO Take 5 mg by mouth daily       gabapentin (NEURONTIN) 400 MG capsule Take 1 capsule (400 mg) by mouth 2 times daily 90 capsule      VITAMIN D, CHOLECALCIFEROL, PO Take 1,000 Units by mouth daily               Allergies:     No Known Allergies           ROS:     A focused ROS is negative other than the symptoms noted above in the HPI.             Physical Examiniation:     VITAL SIGNS: /76   Pulse 81   SpO2 100%   BMI: Estimated body mass index is 22.54 kg/m  as calculated from the following:    Height as of 3/18/15: 1.753 m (5' 9\").    Weight as of 10/14/16: 69.2 kg (152 lb 9.6 oz).    Gen: NAD, pleasant and cooperative   Cardio: regular pulse  Pulm: non-labored breathing in room air  Abd: benign  Ext: WWP, no edema in BLE, no tenderness in calves  Neuro/MSK:   Bilateral upper extremities with intact strength, sensation and reflexes  No volitional movement in bilateral lower extremities, with diminished sensation to LT (was not able to localize accurately with closed eyes) and no reflexes. Legs were flaccid with " normal passive ROM at knees and ankles              Laboratory/Imaging:     NCS/EMG 7/2019  Interpretation:  This is an abnormal study. Sensory and motor nerve conduction studies today elicited no response.  With needle examination there was some minimal insertional abnormalities with the patient was unable to activate any muscles.  Whether this is representative of severe lower motor neuron injury only or represents additional central nervous system injury and inability to activate musculature is unclear and clinical correlation would be recommended.  Per the clinical question at hand for the EMG I do not appreciate any significant motor neuron function on this examination.                         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 At the very end of our visit he asked about his spinal stimulator and the new . Her was not sure where was this placed and who is the managing provider. He wasn't even sure about the exact indication of this invasive procedure and the timing of that. Please see my nurse's separate note about her trying to solve the issue and connecting him with the vendor/primary team.     He also asked about any new research or study at the  for patient with SCI. I can contact Dr. Nichols and / or Dr. Mojica but I would like to have info about the device that he has first.     Spasticity is not an issue due to lower motor neuron  type of his injury. No pain issue at this point. He actually wants to not take gabapentin any more. Neurogenic bowel and bladder are managed well. He is mod I with basic ADLs, mobility and driving. We meed more evaluation of his cognitive deficits but he seems to be coping well.     I got his signature and [place] [facility] but wasn't able to open most of the notes unfortunately. Should clarify if he needs f/u with ID team or not.        1. Patient education: In depth discussion and education was provided about the assessment and implications of each of the below recommendations for management. Patient indicated readiness to learn, all questions were answered and understanding of material presented was confirmed.    2. Work-up: none today    3. Therapy/equipment/braces: continue PT; will send a message to Vidhya to establish a HEP before discharging him. We can consider trial of e-stim to help with disuse atrophy but we don't have strong evidence for that. Has a new OT referral for seating eval which is pending.     4. Medications: no change     5. Interventions: none today     6. Referral / follow up with other providers: new referral to urology to establish care. He needs annual US and other work-up for h/o neurogenic bladder. Consider referral to Dr. Nichols bone health clinic. Also consider neuropsych referral for more evaluation of his cognitive deficits.     7. Follow up: in 6 months     Carla Abdalla MD  Physical Medicine & Rehabilitation    60 minutes spent on the date of the encounter doing chart review, history and exam, documentation and further activities as noted above

## 2022-01-20 ENCOUNTER — TELEPHONE (OUTPATIENT)
Dept: OCCUPATIONAL THERAPY | Facility: CLINIC | Age: 57
End: 2022-01-20
Payer: MEDICARE

## 2022-01-26 ENCOUNTER — TELEPHONE (OUTPATIENT)
Dept: PHYSICAL MEDICINE AND REHAB | Facility: CLINIC | Age: 57
End: 2022-01-26
Payer: MEDICARE

## 2022-01-26 NOTE — TELEPHONE ENCOUNTER
[facility] Call Center    Phone Message    May a detailed message be left on voicemail: yes     Reason for Call: Other: Patient would like a call back regarding his remote stimulator installed in his spine from last appt visit with Lianne. Please contact patient.     Action Taken: Message routed to:  [facility] physical medecine and rehab    Travel Screening: Not Applicable

## 2022-01-27 ENCOUNTER — TELEPHONE (OUTPATIENT)
Dept: PHYSICAL MEDICINE AND REHAB | Facility: CLINIC | Age: 57
End: 2022-01-27
Payer: MEDICARE

## 2022-01-27 NOTE — TELEPHONE ENCOUNTER
Callback to Arnold.  Left detailed message on phone to please return call to gather more information regarding stimulator.    Call Heidy RN  Neurosurgery Care Coordinator with questions/concerns     Thank you for using [facility]

## 2022-01-27 NOTE — TELEPHONE ENCOUNTER
[facility] Call [facility]    Phone Message    May a detailed message be left on voicemail: yes     Reason for Call: Other: Patient has called three times.  Request a call back for information in regard to his stimulator.  He said no one has reached out to him.     Action Taken: Message routed to:  [facility] Neurosurgery    Travel Screening: Not Applicable

## 2022-01-27 NOTE — TELEPHONE ENCOUNTER
"Patient has called a couple of times regarding this device. Dr. Abdalla did not place this device, and as such, is unable to follow up with this patient regarding it.    Per Dr. Abdalla,   \"He has to contact the provider who placed the stimulator and/or their care coordinator. This was not done at the  and I don't even know what kind of procedure was done and what kind of device was placed.\"    Padmini Rodriguez, RN has been in contact with him and has explained to him that he will need to reach out to the physician/place that placed the device.     Christal Sun   "

## 2022-01-27 NOTE — TELEPHONE ENCOUNTER
Writer forwarded to Neurosurgery nurse pool and Neurosurgery clinic scheduling.     ATTN: Romelia Isaacs RNCC or whomever is covering for Romelia in her absence.    BRENDA Sandoval  Neurosurgery clinic navigator.

## 2022-01-31 NOTE — CONFIDENTIAL NOTE
Left  for patient letting him know I got the message re his request for SCS programming. I would like to gather additional information from him before scheduling, as it may make more sense for him to return to pain management. Left my direct number and requested that he call at his earliest convenience.

## 2022-01-31 NOTE — TELEPHONE ENCOUNTER
"Pt returned my call. He does not know where his SCS was implanted, the name of the surgeon, or exactly when, but did say \"it was a couple of years ago.\" There are no records of the surgery in [facility]. I will follow-up with [facility] for this information. I informed pt that I would need to gather information before scheduling an appointment, as it may be more appropriate for the pt to return to the provider who implanted the device. Pt expressed understanding. Nothing further at this time.   "

## 2022-02-01 ENCOUNTER — TELEPHONE (OUTPATIENT)
Dept: NEUROSURGERY | Facility: CLINIC | Age: 57
End: 2022-02-01
Payer: MEDICARE

## 2022-02-01 NOTE — CONFIDENTIAL NOTE
Left  for pt letting him know that I called [facility] re his spinal cord stim surgery and [facility] by [facility] Bridges. I talked to her RN, Joelle, this morning and she said she would follow-up with Dr. Bridges about programming and will call him with more information. Per Joelle, the SCS was implanted for movement, so I think that it makes sense to return to Dr. Bridges's clinic for programming adjustments. Left my number should pt have additional questions.

## 2022-02-21 ENCOUNTER — TELEPHONE (OUTPATIENT)
Dept: INTERNAL MEDICINE | Facility: CLINIC | Age: 57
End: 2022-02-21
Payer: MEDICARE

## 2022-02-21 NOTE — CONFIDENTIAL NOTE
Returned Arnold's call and left a message that we d have the form and  is out of clinic until Wednesday and she will look at it then.    Dinah Ventura on 2/21/2022 at 10:27 AM

## 2022-02-21 NOTE — TELEPHONE ENCOUNTER
[facility] Call Center    Phone Message    May a detailed message be left on voicemail: yes     Reason for Call: Other: Patient calling requesting the status of a form he faxed to [phone] regarding a disability parking permit on 2/9/2022. Please call to discuss thank you.      Action Taken: Message routed to:  [facility]    Travel Screening: Not Applicable

## 2022-03-02 ENCOUNTER — OFFICE VISIT (OUTPATIENT)
Dept: INTERNAL MEDICINE | Facility: CLINIC | Age: 57
End: 2022-03-02
Payer: MEDICARE

## 2022-03-02 ENCOUNTER — LAB (OUTPATIENT)
Dept: LAB | Facility: CLINIC | Age: 57
End: 2022-03-02
Payer: MEDICARE

## 2022-03-02 VITALS
HEART RATE: 69 BPM | WEIGHT: 175 LBS | DIASTOLIC BLOOD PRESSURE: 82 MMHG | SYSTOLIC BLOOD PRESSURE: 124 MMHG | OXYGEN SATURATION: 99 % | BODY MASS INDEX: 25.84 KG/M2

## 2022-03-02 DIAGNOSIS — G82.20 PARAPLEGIA (H): ICD-10-CM

## 2022-03-02 DIAGNOSIS — Z12.5 ENCOUNTER FOR SCREENING FOR MALIGNANT NEOPLASM OF PROSTATE: ICD-10-CM

## 2022-03-02 DIAGNOSIS — E78.5 HYPERLIPIDEMIA, UNSPECIFIED HYPERLIPIDEMIA TYPE: Primary | ICD-10-CM

## 2022-03-02 DIAGNOSIS — R97.20 ELEVATED PROSTATE SPECIFIC ANTIGEN (PSA): ICD-10-CM

## 2022-03-02 DIAGNOSIS — Z00.00 ROUTINE GENERAL MEDICAL EXAMINATION AT A HEALTH CARE FACILITY: ICD-10-CM

## 2022-03-02 LAB
ALBUMIN SERPL-MCNC: 3.6 G/DL (ref 3.4–5)
ALP SERPL-CCNC: 93 U/L (ref 40–150)
ALT SERPL W P-5'-P-CCNC: 25 U/L (ref 0–70)
ANION GAP SERPL CALCULATED.3IONS-SCNC: 3 MMOL/L (ref 3–14)
AST SERPL W P-5'-P-CCNC: 16 U/L (ref 0–45)
BILIRUB SERPL-MCNC: 0.2 MG/DL (ref 0.2–1.3)
BUN SERPL-MCNC: 12 MG/DL (ref 7–30)
CALCIUM SERPL-MCNC: 9.4 MG/DL (ref 8.5–10.1)
CHLORIDE BLD-SCNC: 106 MMOL/L (ref 94–109)
CHOLEST SERPL-MCNC: 224 MG/DL
CO2 SERPL-SCNC: 29 MMOL/L (ref 20–32)
CREAT SERPL-MCNC: 0.82 MG/DL (ref 0.66–1.25)
FASTING STATUS PATIENT QL REPORTED: YES
GFR SERPL CREATININE-BSD FRML MDRD: >90 ML/MIN/1.73M2
GLUCOSE BLD-MCNC: 85 MG/DL (ref 70–99)
HDLC SERPL-MCNC: 42 MG/DL
LDLC SERPL CALC-MCNC: 161 MG/DL
NONHDLC SERPL-MCNC: 182 MG/DL
POTASSIUM BLD-SCNC: 4.2 MMOL/L (ref 3.4–5.3)
PROT SERPL-MCNC: 7.4 G/DL (ref 6.8–8.8)
PSA SERPL-MCNC: 6.41 UG/L (ref 0–4)
SODIUM SERPL-SCNC: 138 MMOL/L (ref 133–144)
TRIGL SERPL-MCNC: 104 MG/DL

## 2022-03-02 PROCEDURE — 36415 COLL VENOUS BLD VENIPUNCTURE: CPT | Performed by: PATHOLOGY

## 2022-03-02 PROCEDURE — 99214 OFFICE O/P EST MOD 30 MIN: CPT | Performed by: INTERNAL MEDICINE

## 2022-03-02 PROCEDURE — G0103 PSA SCREENING: HCPCS | Performed by: PATHOLOGY

## 2022-03-02 PROCEDURE — 80053 COMPREHEN METABOLIC PANEL: CPT | Performed by: PATHOLOGY

## 2022-03-02 PROCEDURE — 80061 LIPID PANEL: CPT | Performed by: PATHOLOGY

## 2022-03-02 ASSESSMENT — PAIN SCALES - GENERAL: PAINLEVEL: NO PAIN (0)

## 2022-03-02 NOTE — NURSING NOTE
Chief Complaint   Patient presents with     Recheck Medication       INGA Dozier at 10:52 AM on 3/2/2022.

## 2022-03-02 NOTE — PROGRESS NOTES
[facility]  Internal Medicine    Chief Complaint   Patient presents with     Recheck Medication       Primary Care Provider: CELY Aviita CNP    HPI:  Arnold Duke is a/an 57 year old male with a past medical history as noted below, who presents today for a follow up. The patient reports he is doing well. He would like to be referred again for rehabilitation. He reports his pressure ulcer has resolved and he checks his skin regularly with a mirror. The patient reports he takes gabapentin for his chronic pain as well as Marijuana for breakthrough pain. He is interested in receiving a medical marijuana certificate. We discussed the patient's preventative health care recommendations and he reports he has already been tested for HIV, hepatitis B and had a colonoscopy two years ago. [facility] and [facility] don't have [facility] to all of his records. He is unsure if he is up to date on his shingles and tetanus immunizations. He is not interested in getting his COVID-19 booster today. The patient denies any fevers, chest pain, abdominal pain, constipation, diarrhea, nausea, vomiting, vision changes, weakness, weight changes, night sweats or coughing. He has no further concerns or questions at this time.     Review of Systems:  See HPI    Patient Active Problem List   Diagnosis     Disseminated coccidioidomycosis     Chronic meningitis     Chronic osteomyelitis, osteomyelitis of unspecified site     Cauda equina syndrome (H)     Depression with anxiety     Psychophysiological insomnia     Sacral pressure sore, unspecified pressure ulcer stage     Hydrocephalus (H)     Low back pain without sciatica, unspecified back pain laterality     Benign non-nodular prostatic hyperplasia without lower urinary tract symptoms     Opioid dependence with opioid-induced disorder (H)     Paraplegia (H)     Pressure ulcer of left buttock, stage 4 (H)     No past medical history on file.    Past  "Surgical History:   Procedure Laterality Date     BIOPSY       GASTROSTOMY, INSERT TUBE, COMBINED       IMPLANT SHUNT VENTRICULOPERITONEAL      due to hydrocephalus from meningitis     [facility]  11/21/2014          Current Outpatient Medications   Medication Sig Dispense Refill     FLUCONAZOLE PO Take 5 mg by mouth daily       gabapentin (NEURONTIN) 400 MG capsule Take 1 capsule (400 mg) by mouth 2 times daily 90 capsule      VITAMIN D, CHOLECALCIFEROL, PO Take 1,000 Units by mouth daily        No Known Allergies    Social History     Tobacco Use     Smoking status: Former Smoker     Smokeless tobacco: Never Used   Substance Use Topics     Alcohol use: None     Drug use: None     No family history on file.    PHYSICAL EXAM:  BP Readings from Last 6 Encounters:   03/02/22 124/82   01/17/22 135/76   09/07/21 115/74   05/06/19 113/78   10/14/16 112/57   09/19/16 112/57     Wt Readings from Last 5 Encounters:   03/02/22 79.4 kg (175 lb)   10/14/16 69.2 kg (152 lb 9.6 oz)   09/19/16 70.8 kg (156 lb)   07/11/16 68 kg (150 lb)   05/11/16 69.3 kg (152 lb 12.8 oz)     Estimated body mass index is 25.84 kg/m  as calculated from the following:    Height as of 3/18/15: 1.753 m (5' 9\").    Weight as of this encounter: 79.4 kg (175 lb).  /82 (BP Location: Left arm, Patient Position: Sitting, Cuff Size: Adult Large)   Pulse 69   Wt 79.4 kg (175 lb)   SpO2 99%   BMI 25.84 kg/m      Physical Examination:    General: Alert and oriented without distress. Sitting in wheelchair and able to move independently  HEENT: Normocephalic/atraumatic  Respiratory: CTAB without increased respiratory effort or accessory muscle use  Cardiovascular: RRR without murmurs, rubs or gallop. S1, S2 intact.  Abdomen: Bowel sounds present. Soft, non-distended without tenderness to palpation or rebound.   Skin: No overt lesions, bruises, rashes or bleeding on exposed skin surfaces.    THE FOLLOWING PERTINENT TEST RESULTS WERE REVIEWED TODAY:  PSA, lipid " panel and comprehensive metabolic panel.    Arnold was seen today for recheck medication.    Diagnoses and all orders for this visit:    Hyperlipidemia, unspecified hyperlipidemia type  Patient with hyperlipidemia. He is interested in trying lifestyle medications prior to starting a medication. We discussed the importance of a healthy diet, exercise and further rehabilitation.   -     Lipid panel reflex to direct LDL Fasting; Future  - Follow up in six months    Paraplegia (H)  Patient with lingering bilateral lower extremity and core muscle weakness secondary to a coccidioidomycosis infection. He has a spinal stimulator and is still hopeful he will be able to walk again in the future. He states he needs a new referral for further rehabilitation. In addition, the patient takes gabapentin and marijuana for his symptoms.  -     Physical Therapy Referral; Future  -     PHYSIATRY (PM&R) REFERRAL; Future  - Follow up with Dr. Santiago for a medical marijuana certificate    Elevated prostate specific antigen (PSA)  The patient was found to have an elevated PSA of 6.41. We discussed possible causes and with joint decision making decided to pursue further testing with an MRI of the prostate.   -     MR Prostate wo & w Contrast; Future    Preventative Health Care  - Patient instructed to provide vaccine records at his next appointment.   - Encouraged COVID-19 booster vaccine    Patient seen and case discussed with Dr. Jay Avalos who agrees with the above assessment and plan.     Jose Boss, DO  PGY-1, Internal Medicine  [facility]    Patients: if you have questions or concerns about this progress note, please discuss them with the provider at a future office visit.

## 2022-03-07 ENCOUNTER — TELEPHONE (OUTPATIENT)
Dept: INTERNAL MEDICINE | Facility: CLINIC | Age: 57
End: 2022-03-07
Payer: MEDICARE

## 2022-03-07 NOTE — TELEPHONE ENCOUNTER
Attempted to call number that was listed and it went to voicemail. No information for who the voicemail is for or if voicemail is secure, so no information was left on voicemail.    Will need a fax number in which we can fax referral to for pts stimulator.   Aimee Montero LPN 3/7/2022 10:48 AM

## 2022-03-07 NOTE — TELEPHONE ENCOUNTER
[facility] Call Center    Phone Message    May a detailed message be left on voicemail: yes     Reason for Call: Other: Pt called and stated that he needs his stimulator reprogrammed and pt stated in order for that to happen the clinic needs to contact a Jacob nogueira at [place], phone is [phone]      Action Taken: Message routed to:  [facility]

## 2022-03-15 ENCOUNTER — HOSPITAL ENCOUNTER (OUTPATIENT)
Dept: OCCUPATIONAL THERAPY | Facility: CLINIC | Age: 57
Setting detail: THERAPIES SERIES
Discharge: HOME | End: 2022-03-15
Attending: INTERNAL MEDICINE
Payer: MEDICARE

## 2022-03-15 PROCEDURE — 97542 WHEELCHAIR MNGMENT TRAINING: CPT | Mod: GO | Performed by: OCCUPATIONAL THERAPIST

## 2022-03-15 NOTE — PROGRESS NOTES
"   SEATING AND WHEELED MOBILITY ASSESSMENT  03/15/22 1500   Quick Adds   Quick Adds Certification;Current Manual Wheelchair   General Information    Rehab Discipline OT   Funding Medicare/MA   Service Outpatient;Occupational Therapy;Seating/Wheeled Mobility Evaluation   Height 5'9\"   Weight 175   Start Of Care Date 03/15/22   Referring Physician Vivian Silva MD   Orders Per Therapist Evaluation;Evaluate And Treat As Indicated   Orders Date 11/29/21   Patient/Caregiver Goals Manual wheelchair replacement   Rehabilitation Technology Supplier Mely ALEXANDER from [employer]   Current Community Support Personal Care Attendant;Family/Friend Caregiver;Transportation Service;Therapy Services  (8 hours PCA/wk, PT pain management)   Patient role/Employment history Employed;Other/Comments  (currently laid off- IT)   Fall Risk Screen   Fall screen completed by OT   Have you fallen 2 or more times in the past year? No   Is patient a fall risk? No   Medical History   Onset Of Illness/injury Or Date Of Surgery 11/29/21  (order)   Medical Diagnosis Coccidioidomycosis (valley fever) with resulting paraplegia   Medical History Stage 4 pressure wounds with bilateral flap surgeries, hydrocephalus, depression, anxiety   Recent or Planned Surgeries flap surgeries   Current Manual Wheelchair   Age 6    Ti lite   Cushion Air Filled   Wheelchair Back Solid Curved   Footrest Style center mount   Headrest No   Settings Used Home;Outdoor Community Mobility;Primary Means of Transportation   Condition Fair   Manual Wheelchair Comments Better set up for increase tolerance of full time chair use   Home Accessibility   Living Environment Apartment/condo   Primary Entrance Level;Elevator   All Rooms Wheelchair Accessible Yes   Community ADL   Transportation Car;Transportation Services   Community Mobility Requirements Medical Appointments   Cognitive/Visual/Hearing Status   Observations No Problems Observed During Evaluation "   Vision Intact;Corrective Lenses   ADL Status   Feeding Independent   Grooming/Hygiene Independent   Dressing Independent   Toileting Independent;Uses Equipment  (safety frame with padded seat that is ripped)   Bathing Independent  (extended tub transfer bench, unstable, needs tub lift)   Meal Preparation Requires Assist   Home Management Requires Assist   ADL Comments Standing frame- every day   Fatigue   Fatigue Measure Score Increased fatigue with longer distances and full time chair use   Balance   Unsupported Sitting Balance Within Functional Limits   Sitting Balance in Chair Uses Upper Extremities for Balance   Standing Balance Physical Assist Required;Unable  (standing frame)   Ambulation   Ambulation Non Ambulatory   Transfers   Transfer Assist Independent   Transfer Method Squat Pivot/Scoot Boost   Sleep/Rest   Sleep Surface/Equipment standard bed   Wheelchair Ability   Wheelchair Ability Quick Adds Manual Chair;Wheelchair Use   Manual Wheelchair Propulsion   Manual Wheelchair Propulsion Independent   Wheelchair Use   Ability to Perform Weight Shifts Independent   Bed Confined Without Wheelchair? Yes   Hours in Wheelchair Daily 6   Hours Spent Alone Daily 24   Neuromuscular   History of Pressure Sores Yes   Pain Yes   Pain Location waist down   Pain Scale  5   Coordination UE Intact;LE Impaired   Tone Spasticity   Sensory Deficits Reported T5 down impaired and worse towards feet   Sensation on Seating Surface Impaired per Report   Head and Neck   Head and Neck Position Functional   Upper Extremities   UE ROM Full ROM   UE Strength 4+/5   Dominance Right   UE Comments Pain and fatigue with shoulders with use of manual chair   Pelvis   Anterior/Posterior Pelvis Position Posterior Tilt;Partially Flexible   Pelvic Obliquity Left Elevation   Trunk   Anterior/Posterior Trunk Position Increased Lumbar Lordosis   Left-Right Trunk Position S Curve   Lower Extremities   LE ROM Full passive    LE Strength 0/5   Foot  Positioning Plantar flexed   Patient Measurements   Other see atp notes   Education Assessment   Barriers to Learning Emotional   Preferred Learning Style Listening;Demonstration   Assessment/Plan   Criteria for Skilled Interventions Met Yes, Treatment Indicated   Treatment Diagnosis impaired participation in MRADLS   Therapy Frequency once   Planned Therapy Interventions Wheelchair Management/Propulsion Training   Planned Therapy Interventions Comments Determined need for new manual chair with better set up and supports- backrest and cushion to support safe and independent propulsion techniques.  Patient needed longer seat frame and angle adjustments to allow feet to be more anteior to prevent from pain with being tucked and from falling off.  Trails of Ride forward sucessful today and opened back angle to increase comfort and postural support.   Risks and benefits of treatment have been explained Yes   Patient/family & other staff in agreement with plan of care Yes   Comments Trails of new K5 rigid for better set up, comode chair to be replaced due to rip that may cause wounds and needs bath lift to safely and independently shower as current chair is unsafe.   Session Time   OT Wheelchair Management Minutes (38674) 60   Certification   Certification date from 03/15/22   Certification date to 03/15/22    OT Goal 1   Goal Identifier Wheelchair   Goal Description Patient to demonstrate a successful home trial with the recommended equipment   Target Date 03/15/22   Date Met 03/15/22   Electronically signed by:  Mely Dixon OTR/L, ATP      Occupational Therapist, Assistive   [phone]      fax: [phone]      [email or web address]  Seating Clinic- [facility], [address]  [address]  [place] [address]

## 2022-03-21 ENCOUNTER — OFFICE VISIT (OUTPATIENT)
Dept: INTERNAL MEDICINE | Facility: CLINIC | Age: 57
End: 2022-03-21
Payer: MEDICARE

## 2022-03-21 VITALS
DIASTOLIC BLOOD PRESSURE: 76 MMHG | OXYGEN SATURATION: 99 % | RESPIRATION RATE: 16 BRPM | SYSTOLIC BLOOD PRESSURE: 115 MMHG | HEART RATE: 94 BPM

## 2022-03-21 DIAGNOSIS — G82.20 PARAPLEGIA (H): Primary | ICD-10-CM

## 2022-03-21 PROCEDURE — 99213 OFFICE O/P EST LOW 20 MIN: CPT | Performed by: INTERNAL MEDICINE

## 2022-03-21 NOTE — PROGRESS NOTES
HPI  57-year-old male had a history of disseminated coccidiomycosis complicated by chronic meningitis osteomyelitis and cauda equina syndrome with subsequent paraplegia.  He complained of a wheelchair.  This has resulted in chronic pain.  [facility] has been weaned off opioids completely.  He does have coping strategies in terms of relaxation techniques and psychological techniques to help deal with flares of the pain.  Is mostly bothered by nocturnal pain.  Occasionally this will keep him awake through much of the night is not able to fall asleep until shortly before he has to get up.  The patient's been experimenting with marijuana and finds that smoking marijuana at bedtime is effective in terms of pain management.  He is interested and certification for medical marijuana and pursuing this through legal channels.  Said no side effects or ill effects related to it  No past medical history on file.  Past Surgical History:   Procedure Laterality Date     BIOPSY       GASTROSTOMY, INSERT TUBE, COMBINED       IMPLANT SHUNT VENTRICULOPERITONEAL      due to hydrocephalus from meningitis     PICC  11/21/2014          No family history on file.      Exam:  /76 (BP Location: Right arm, Patient Position: Sitting, Cuff Size: Adult Regular)   Pulse 94   Resp 16   SpO2 99%   The patient is alert, oriented with a clear sensorium.   Skin shows no lesions or rashes and good turgor.   Head is normocephalic and atraumatic.    PEG 3= 10+9+10    ASSESSMENT  1 cauda equina syndrome with paraplegia and chronic pain  2 hyperlipidemia  3 history of elevated PSA    Plan  [facility]  We did certify him for medical marijuana asked him to follow-up with me for reassessment in 6 months this can be done through phone visit.  Follow-up sooner immediately if questions or problems.    This note was completed using Dragon voice recognition  software.      Abhinav Santiago MD  General Internal Medicine  [facility]  [phone]

## 2022-03-21 NOTE — NURSING NOTE
Arnold Duke is a 57 year old male patient that presents today in clinic for the following:    Chief Complaint   Patient presents with     Recheck Medication     The patient's allergies and medications were reviewed as noted. A set of vitals were recorded as noted without incident: /76 (BP Location: Right arm, Patient Position: Sitting, Cuff Size: Adult Regular)   Pulse 94   Resp 16   SpO2 99% . The patient does not have any other questions for the provider.    Clarence Puente, INGA at 3:48 PM on 3/21/2022

## 2022-03-22 ENCOUNTER — TELEPHONE (OUTPATIENT)
Dept: INTERNAL MEDICINE | Facility: CLINIC | Age: 57
End: 2022-03-22

## 2022-03-22 NOTE — PROGRESS NOTES
[facility] Rehabilitation Service    Outpatient Physical Therapy Discharge Note  Patient: Arnold Duke  : 1965    Beginning/End Dates of Reporting Period:  22 to 21    Referring Provider: Diego Swain Jr., MD - resident; Vivian Silva MD    Therapy Diagnosis: Impaired core strength and sitting balance     Client Self Report: Patient thought his appointment was 45 minutes earlier. He arrived early and therefore his transportation came early, wants to work on stretching and review of necessary info for possible discharge. 3/22/22: Patient did not return for therapy, discharge with most goals met and progress made toward his standing goal.     Goals:  Goal Identifier HEP   Goal Description Patient will demonstrate understanding and compliance to his HEP for continued wellbeing upon discharge from skilled physical therapy.   Target Date 21   Date Met  21   Progress (detail required for progress note): Patient reports compliance to his HEP, needs assist with stretching. Goal met     Goal Identifier Function in sitting test   Goal Description Patient will complete the Function In Sitting Test with a score of 32/56 to demonstrate improved safety and independence with sitting balance tasks.   Target Date 21   Date Met  21   Progress (detail required for progress note): Baseline 25/56; 21: 39/56 demonstrating a significant improvement in sitting balance and function. Goal met.      Goal Identifier Car steering wheel   Goal Description Patient will demonstrate ability to maintain upright posture and core stabilization to turn the steering wheel in his car for safety and independence with driving.    Target Date 21   Date Met  21   Progress (detail required for progress note): 21: Patient reports ability to turn his steering wheel without even thinking of his  sitting balance. He demonstrates good postural control and driving is easy and second nature now. Goal met     Goal Identifier Standing   Goal Description Patient will maintain standing with B UE support and weight supported on B LEs for 2 minutes to demonstrate improved strength and upright standing tolerance.    Target Date 12/11/21   Date Met      Progress (detail required for progress note): Basline about 20 seconds with B knee block; Per note on 10/14/21: Use of Liko lift coffey pants to assist patient to standing and to maintain standing for 1.5-2 minutes, therapist assist for R knee extension and cues for glute activation to achieve hip extension, facilitation at his right glute.; progress made toward his goal     Plan:  Discharge from therapy.    Discharge:    Reason for Discharge: Patient has met all goals.  Patient has failed to schedule further appointments.    Equipment Issued: None    Discharge Plan: Patient to continue home program.

## 2022-03-22 NOTE — TELEPHONE ENCOUNTER
[facility] Call Center    Phone Message    May a detailed message be left on voicemail: yes     Reason for Call: Other: Patient is calling about the MRI that was supposed to be completed today. It was unable to be completed due to it being non conditional and patient is wondering what to due next. Please call and discuss.     Action Taken: Message routed to:  [facility]    Travel Screening: Not Applicable

## 2022-03-23 NOTE — TELEPHONE ENCOUNTER
"Left a message for the patient to relay that we will need more information as to what they mean in terms of \"non conditional\".    [facility] to see if one of the following is an option:   1) a remote to turn the back stimulator off  2) if the company can turn the back stimulator off  3) if the back stimulator that was placed is MRI friendly    If non of the above are possible, then the patient is to let us know that and a message will be passed along to  to see what the next steps may be.   Aimee Montero LPN 3/23/2022 10:54 AM    "

## 2022-03-29 NOTE — TELEPHONE ENCOUNTER
Have not received a call back from patient. Will close encounter. Aimee Montero LPN 3/29/2022 10:48 AM

## 2022-04-06 ENCOUNTER — TELEPHONE (OUTPATIENT)
Dept: INTERNAL MEDICINE | Facility: CLINIC | Age: 57
End: 2022-04-06

## 2022-04-06 DIAGNOSIS — G82.20 PARAPLEGIA (H): Primary | ICD-10-CM

## 2022-04-06 NOTE — TELEPHONE ENCOUNTER
[facility] Call Center    Phone Message    May a detailed message be left on voicemail: yes     Reason for Call: Other: Patient calling requesting a return call to discuss his physical therapy. Per patient he thinks he needs more therapy than once a week. Please call to discuss thank you.      Action Taken: Message routed to:  [facility]    Travel Screening: Not Applicable

## 2022-04-06 NOTE — TELEPHONE ENCOUNTER
Spoke to patient who states that he would like more aggressive physical therapy than just once a week. Patient would like a PT referral to [facility].     Pt was most recently seen in clinic on 3/21/22. Aimee Montero LPN 4/6/2022 3:23 PM

## 2022-04-06 NOTE — TELEPHONE ENCOUNTER
Spoke to patient to relay that the PT referral was faxed to [facility]. Aimee Montero LPN 4/6/2022 4:03 PM

## 2022-04-12 ENCOUNTER — TELEPHONE (OUTPATIENT)
Dept: INTERNAL MEDICINE | Facility: CLINIC | Age: 57
End: 2022-04-12
Payer: MEDICARE

## 2022-04-12 NOTE — TELEPHONE ENCOUNTER
Spoke to the patient who states that he was trying to think ahead incase he needed additional imaging to clear him for pool therapy, as he would really like to do pool therapy for rehab.     Advised the patient that he should go to his PT appointment and see what they advise and then if needed a referral for PT-pool therapy can be placed. Patient stated verbal understanding. Aimee Motnero LPN 4/12/2022 11:50 AM

## 2022-04-12 NOTE — TELEPHONE ENCOUNTER
[facility] Call Center    Phone Message    May a detailed message be left on voicemail: yes     Reason for Call: Order(s): Other: Bone density test  Reason for requested: patient request, wants to start pool therapy  Date needed: asap  Provider name: Dr. Jay Avalos    Patient would like to have a bone density test to determine if he is eligible for pool therapy. Starting with rehab program at [facility] and [facility] exercises are involved and wanting to make sure bone density is okay before starting.    Please call back to discuss.      Action Taken: Message routed to:  [facility]    Travel Screening: Not Applicable

## 2022-05-12 NOTE — PROGRESS NOTES
REQUISITION AND JUSTIFICATION FOR DURABLE MEDICAL EQUIPMENT    Patient Name:  Arnold Duke  MR #:  [id number]  :  1965  Age/Gender:  57 year old male  Visit Date:  Arnold Duke seen for seating and wheeled mobility evaluation by Mely Dixon OTR/L,ATP and ATP from [facility] on 3/15/22.    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 Specifically affected are toileting (being able to get there in time to prevent accidents), dressing, and bathing (getting into the bathroom of designated place). Current equipment used is 5 year old Ti Lite rigid manual chair that is in need of a better configuration in order tolerate full time use. This patient needs the new equipment requested to be able to continue to be independent with all MRADLS free from pain and injury. Please see additional documentation in the seating and wheeled mobility report for details.   Arnold had a successful clinical trial here, and also a successful trial at home with the recommended equipment. Arnold is very willing and physically / cognitively able to use the recommended equipment to assist his with mobility-related activities of daily living and general mobility.     B) Arnold's mobility limitation cannot be sufficiently and safely resolved by the use of an appropriately fitted cane or walker because he is non ambulatory due to pareplegia. Strength of legs is 0/5 for one maximal repetition. Fatigue also impacts this patient's ability to ambulate, regardless of the gait aid.    C) Arnold does not have sufficient upper extremity function to self-propel a k1-4 chair and requires an optimally-configured K5 ultra  lightweight manual wheelchair in his home to perform MRADLs during a typical day due to limitations in strength, endurance, range of motion, and coordination.  Strength of arms is 4+/5.  D) The need for this equipment is LIFETIME.     RECOMMENDATIONS FOR MOBILITY BASE, SEATING SYSTEM AND COMPONENTS  Ti lite Aero Z ultra lightweight manual wheelchair - this ultra light weight manual wheelchair is appropriate and necessary for Arnold to be able to assist and complete all of his MRADLs within his residence. He has mobility limitations impacting his ability to ambulate independently or with any ambulation aid. He has had a thorough clinical evaluation, and this manual wheelchair is the best option for this patient.  Any less costly wheelchair option would be unable to accommodate anterior-posterior axle adjustments to maximize propulsion mechanics required for shoulder preservation in full-time, active manual wheelchair propeller.      Ergonomic seat- provides a flat surface for ITs to sit while still allowing seat slow in order to properly align hips for reduction of sacral sitting that can cause pressure and pain.    Soft roll casters - for smooth ride not to jar/shake him    Titanium flip back footrests - for leg support, lightest option, moves out of the way for transfers and getting close to stationary objects like a wall.    Primo air tires - for proper maneuverability, traction and speed with propulsion.    Composite scissor wheel locks- allows for independence use of brakes for safety with chair use.    Side guards- protects skin and clothing from getting stuck in wheels with propulsion.    Rear anti-tip tubes - for safety to prevent w/c tipping rearward    Pelvic positioning strap - to assist maintaining pelvis position for functional activities    Ride Forard seat cushion - this pressure distribution and positioning seat cushion will optimally  distribute seating pressures to prevent pressure ulcers, but also  provide a stable base of support for Arnold to use during MRADLs.    Elite E2 Solid curved and padded back support - firm and contoured back support is needed to support Arnold's thoracolumbar area in an upright and midline position, with appropriate support pads as needed. This back support is essential to provide sufficient posterior support to maximize his postural alignment and minimize his tendency to develop scoliosis and other secondary complications.    This equipment is reasonable and necessary with reference to accepted standards of medical practice and treatment of this patient's condition and is not being recommended as a convenience item. Without this recommended equipment, he is highly likely to sustain injuries from falls, develop pressure sores or postural compensation, and/or be bed confined, which those costs far exceed the cost of the requested equipment.    Electronically signed by:  Mely CLAIRE, ATP      Occupational Therapist, Assistive   [phone]      fax: [phone]      [email or web address], [place] [address]  May 12, 2022    I have read and concur with the above recommendations.    Physician Printed Name __________________________________________    Physician SIgnature  _____________________________________________    Date of SIgnature ______________________________    Physician Phone  ______________________________

## 2022-05-19 ENCOUNTER — MEDICAL CORRESPONDENCE (OUTPATIENT)
Dept: HEALTH INFORMATION MANAGEMENT | Facility: CLINIC | Age: 57
End: 2022-05-19
Payer: MEDICARE

## 2022-05-23 ENCOUNTER — TELEPHONE (OUTPATIENT)
Dept: INTERNAL MEDICINE | Facility: CLINIC | Age: 57
End: 2022-05-23
Payer: MEDICARE

## 2022-05-23 NOTE — TELEPHONE ENCOUNTER
[facility] Call Center    Phone Message    May a detailed message be left on voicemail: yes     Reason for Call: Other: Patient calling to check the status of a sign off for patients new wheelchair. Patient states [employer] should be handling it and he is confused as to what is taking the clinic so long to sign off for him to get a new wheelchair. Please call to discuss thank you.      Action Taken: Message routed to:  [facility] [place]    Travel Screening: Not Applicable

## 2022-05-23 NOTE — TELEPHONE ENCOUNTER
[facility] to patient [email or web address] on 5/19 or 5/20. Patient states verbal understanding and will call [facility] back. Aimee Montero LPN 5/23/2022 3:09 PM

## 2022-06-02 ENCOUNTER — MEDICAL CORRESPONDENCE (OUTPATIENT)
Dept: HEALTH INFORMATION MANAGEMENT | Facility: CLINIC | Age: 57
End: 2022-06-02
Payer: MEDICARE

## 2022-06-20 ENCOUNTER — TELEPHONE (OUTPATIENT)
Dept: PHYSICAL MEDICINE AND REHAB | Facility: CLINIC | Age: 57
End: 2022-06-20
Payer: MEDICARE

## 2022-06-20 NOTE — TELEPHONE ENCOUNTER
Left detailed message for pt to call back and confirm of the appointment that has been moved from 9/27  To 6/27/22 at 8:20 due to pt. Requested (waitlist to be moved up when sooner appt is available)    Thanks,   sully

## 2022-07-18 ENCOUNTER — VIRTUAL VISIT (OUTPATIENT)
Dept: INTERNAL MEDICINE | Facility: CLINIC | Age: 57
End: 2022-07-18
Payer: MEDICARE

## 2022-07-18 DIAGNOSIS — F43.23 ADJUSTMENT DISORDER WITH MIXED ANXIETY AND DEPRESSED MOOD: Primary | ICD-10-CM

## 2022-07-18 PROCEDURE — 99213 OFFICE O/P EST LOW 20 MIN: CPT | Mod: 95 | Performed by: INTERNAL MEDICINE

## 2022-07-18 NOTE — PROGRESS NOTES
Arnold is a very pleasant 57 year old who is being evaluated via a billable video visit.      How would you like to obtain your AVS? MyChart  If the video visit is dropped, the invitation should be resent by: Text to cell phone: [phone]  Will anyone else be joining your video visit? Shelley    Paola Jarquin VF    Here to discuss chronic pain, medical marijuana.  In general, he tries to avoid medication, and right now is only taking gabapentin for chronic pain in addition to mental techniques.    Submitted an application to a medical marijuana dispensary to be able to receive the product.  Saw Dr. Santiago back in March who authorized this. He also gets therapy at [facility].      However he needs to re-send this paperwork to accomplish this.  Also seeking a referral to a mental health counselor.    On exam, he is alert, in NAD, speaking in full sentences, no audible wheezing or cough.  Mood/affect seem upbeat, although he did appear more anxious when discussing his mental health.    A/P Arnold was seen today for video visit for chronic pain and mental health.    Adjustment disorder with mixed anxiety and depressed mood  -     Adult Mental Health  Referral; Future      Video-Visit Details    Type of service:  Video Visit started 12:33 ended 12:43 with another 10 minutes chart review and documentation same day    Originating Location (pt. Location): Home    Distant Location (provider location):  [facility] [place]     Platform used for Video Visit: Louie Avalos MD

## 2022-07-20 ENCOUNTER — TELEPHONE (OUTPATIENT)
Dept: INTERNAL MEDICINE | Facility: CLINIC | Age: 57
End: 2022-07-20

## 2022-07-20 NOTE — TELEPHONE ENCOUNTER
Spoke to patient, who states that at his appointment on 7/18/22 with PCP he asked for the email for medical marijuana be resent to him, as something came up when he was supposed to do the certification shorly after his 3/21/22 appointment for the medical marijuana cert.     Patient is hoping to get the first email resent that was sent to him back in March 2022.   Aimee Montero LPN 7/20/2022 3:15 PM      He needs to contact Department of Health re his medical cannabis status  JL

## 2022-07-20 NOTE — TELEPHONE ENCOUNTER
[facility] Call [facility]    Phone Message    May a detailed message be left on voicemail: yes     Reason for Call: Other: Other: Per call from patient, saw  the other day and was supposed to get an email from provider re: visit discussion.      Action Taken: Message routed to:  [facility]    Travel Screening: Not Applicable                                                                       independent

## 2022-07-21 NOTE — TELEPHONE ENCOUNTER
Left a message for patient that he will need to call the Department of Health to see where his status is at and if they need to resend anything. Aimee Montero LPN 7/21/2022 11:53 AM

## 2022-07-25 NOTE — TELEPHONE ENCOUNTER
Pt called and stated that the [place] Dept of Health is waiting on the clinic to send them the email, pt requesting call back    He will need at least a phone visit for me to complete this.  Put him in the 5 p.m. slot on Friday for this.  TREVER

## 2022-07-26 NOTE — TELEPHONE ENCOUNTER
Spoke to patient to relay providers message and scheduled patient for telephone appt. With Dr. Santiago for 7/29/22. Aimee Montero LPN 7/26/2022 12:07 PM

## 2022-07-29 ENCOUNTER — VIRTUAL VISIT (OUTPATIENT)
Dept: INTERNAL MEDICINE | Facility: CLINIC | Age: 57
End: 2022-07-29
Payer: MEDICARE

## 2022-07-29 DIAGNOSIS — G82.20 PARAPLEGIA (H): Primary | ICD-10-CM

## 2022-07-29 PROCEDURE — 99441 PR PHYSICIAN TELEPHONE EVALUATION 5-10 MIN: CPT | Mod: 95 | Performed by: INTERNAL MEDICINE

## 2022-07-29 RX ORDER — GABAPENTIN 300 MG/1
300 CAPSULE ORAL 3 TIMES DAILY
COMMUNITY
Start: 2021-10-05 | End: 2022-09-23

## 2022-07-29 NOTE — PROGRESS NOTES
Arnold is a 57 year old who is being evaluated via a billable telephone visit.      Pt has pain in both legs.     What phone number would you like to be contacted at? [phone]  How would you like to obtain your AVS? [employer]  Phone call duration: 5 minutes    Iris Mcdonald VF    Phone visit with the patient regarding his certification for medical cannabis he continues to have bilateral leg pain he had previously not responded to the certification questionnaire and needed to be recertified currently his pain severity in the past week was 7 this interferes with his enjoyment of life by 10 and his activity problems from pain rates a 9.  We did recertify him through the state website with this information.  Abhinav Santiago MD  (5 minutes)

## 2022-08-08 ENCOUNTER — MYC MEDICAL ADVICE (OUTPATIENT)
Dept: PSYCHOLOGY | Facility: CLINIC | Age: 57
End: 2022-08-08

## 2022-08-18 NOTE — PROGRESS NOTES
"   SEATING AND WHEELED MOBILITY ASSESSMENT  03/15/22 1500   Quick Adds   Quick Adds Certification;Current Manual Wheelchair   General Information    Rehab Discipline OT   Funding Medicare/MA   Service Outpatient;Occupational Therapy;Seating/Wheeled Mobility Evaluation   Height 5'9\"   Weight 175   Start Of Care Date 03/15/22   Referring Physician Vivian Silva MD   Orders Per Therapist Evaluation;Evaluate And Treat As Indicated   Orders Date 11/29/21   Patient/Caregiver Goals Manual wheelchair replacement   Rehabilitation Technology Supplier Mely ALEXANDER from [employer]   Current Community Support Personal Care Attendant;Family/Friend Caregiver;Transportation Service;Therapy Services  (8 hours PCA/wk, PT pain management)   Patient role/Employment history Employed;Other/Comments  (currently laid off- IT)   Fall Risk Screen   Fall screen completed by OT   Have you fallen 2 or more times in the past year? No   Is patient a fall risk? No   Medical History   Onset Of Illness/injury Or Date Of Surgery 11/29/21  (order)   Medical Diagnosis Coccidioidomycosis (valley fever) with resulting paraplegia   Medical History Stage 4 pressure wounds with bilateral flap surgeries, hydrocephalus, depression, anxiety   Recent or Planned Surgeries flap surgeries   Current Manual Wheelchair   Age 6    Ti lite   Cushion Air Filled   Wheelchair Back Solid Curved   Footrest Style center mount   Headrest No   Settings Used Home;Outdoor Community Mobility;Primary Means of Transportation   Condition Fair   Manual Wheelchair Comments Better set up for increase tolerance of full time chair use   Home Accessibility   Living Environment Apartment/condo   Primary Entrance Level;Elevator   All Rooms Wheelchair Accessible Yes   Community ADL   Transportation Car;Transportation Services   Community Mobility Requirements Medical Appointments   Cognitive/Visual/Hearing Status   Observations No Problems Observed During Evaluation "   Vision Intact;Corrective Lenses   ADL Status   Feeding Independent   Grooming/Hygiene Independent   Dressing Independent   Toileting Independent;Uses Equipment  (safety frame with padded seat that is ripped)   Bathing Independent  (extended tub transfer bench, unstable, needs tub lift)   Meal Preparation Requires Assist   Home Management Requires Assist   ADL Comments Standing frame- every day   Fatigue   Fatigue Measure Score Increased fatigue with longer distances and full time chair use   Balance   Unsupported Sitting Balance Within Functional Limits   Sitting Balance in Chair Uses Upper Extremities for Balance   Standing Balance Physical Assist Required;Unable  (standing frame)   Ambulation   Ambulation Non Ambulatory   Transfers   Transfer Assist Independent   Transfer Method Squat Pivot/Scoot Boost   Sleep/Rest   Sleep Surface/Equipment standard bed   Wheelchair Ability   Wheelchair Ability Quick Adds Manual Chair;Wheelchair Use   Manual Wheelchair Propulsion   Manual Wheelchair Propulsion Independent   Wheelchair Use   Ability to Perform Weight Shifts Independent   Bed Confined Without Wheelchair? Yes   Hours in Wheelchair Daily 6   Hours Spent Alone Daily 24   Neuromuscular   History of Pressure Sores Yes   Pain Yes   Pain Location waist down   Pain Scale  5   Coordination UE Intact;LE Impaired   Tone Spasticity   Sensory Deficits Reported T5 down impaired and worse towards feet   Sensation on Seating Surface Impaired per Report   Head and Neck   Head and Neck Position Functional   Upper Extremities   UE ROM Full ROM   UE Strength 4+/5   Dominance Right   UE Comments Pain and fatigue with shoulders with use of manual chair   Pelvis   Anterior/Posterior Pelvis Position Posterior Tilt;Partially Flexible   Pelvic Obliquity Left Elevation   Trunk   Anterior/Posterior Trunk Position Increased Lumbar Lordosis   Left-Right Trunk Position S Curve   Lower Extremities   LE ROM Full passive    LE Strength 0/5   Foot  Positioning Plantar flexed   Patient Measurements   Other see atp notes   Education Assessment   Barriers to Learning Emotional   Preferred Learning Style Listening;Demonstration   Assessment/Plan   Criteria for Skilled Interventions Met Yes, Treatment Indicated   Treatment Diagnosis impaired participation in MRADLS   Therapy Frequency once   Planned Therapy Interventions Wheelchair Management/Propulsion Training   Planned Therapy Interventions Comments Determined need for new manual chair with better set up and supports- backrest and cushion to support safe and independent propulsion techniques.  Patient needed longer seat frame and angle adjustments to allow feet to be more anteior to prevent from pain with being tucked and from falling off.  Trails of Ride forward sucessful today and opened back angle to increase comfort and postural support.   Risks and benefits of treatment have been explained Yes   Patient/family & other staff in agreement with plan of care Yes   Comments Trails of new K5 rigid for better set up, comode chair to be replaced due to rip that may cause wounds and needs bath lift to safely and independently shower as current chair is unsafe.   Session Time   OT Wheelchair Management Minutes (92531) 60   Certification   Certification date from 03/15/22   Certification date to 03/15/22    OT Goal 1   Goal Identifier Wheelchair   Goal Description Patient to demonstrate a successful home trial with the recommended equipment   Target Date 03/15/22   Date Met 03/15/22       ROHO overlay for toilet seat - this pressure distribution and positioning seat cushion will optimally  distribute seating pressures to prevent pressure ulcers, but also provide a stable base of support for Arnold to use to prevent pressure when toileting.  He has prior history of wounds with flap surgeries and requires this in order to prevent pressure when compleitng bowel program.    Electronically signed by:  Mely Dixon OTR/L,  ATP      Occupational Therapist, Assistive   [phone]      fax: [phone]      [email or web address], [address]  [place]   [address]

## 2022-08-24 NOTE — TELEPHONE ENCOUNTER
Called patient to reschedule appointment for 10/21/22, as I will be out of the office that day. Patient elected to reschedule for 9/21/22 at 2:00pm.     Carolina Martinez, Ph.D., , [facility]  Clinical Health Psychologist  Phone: [phone]   Pager: [phone]  8/24/2022 4:49 PM

## 2022-08-30 ENCOUNTER — TELEPHONE (OUTPATIENT)
Dept: INTERNAL MEDICINE | Facility: CLINIC | Age: 57
End: 2022-08-30

## 2022-08-30 NOTE — TELEPHONE ENCOUNTER
[facility] Call Center    Phone Message    May a detailed message be left on voicemail: yes     Reason for Call: Other: Pt is calling in stating he received an email from [facility] stating they are not getting the requested information back from Dr. Jay Avalos for the pt's bathroom equipment that was faxed over a week ago.      Pt is asking for a call back to discuss this    Action Taken: Message routed to:  [facility]    Travel Screening: Not Applicable

## 2022-09-01 ENCOUNTER — DOCUMENTATION ONLY (OUTPATIENT)
Dept: INTERNAL MEDICINE | Facility: CLINIC | Age: 57
End: 2022-09-01

## 2022-09-01 NOTE — PROGRESS NOTES
Type of Form Received: [employer]otion    Form Received (Date) 8/23/22   Form Filled out Yes, date 9/1/22   Placed in provider folder

## 2022-09-02 ENCOUNTER — MEDICAL CORRESPONDENCE (OUTPATIENT)
Dept: HEALTH INFORMATION MANAGEMENT | Facility: CLINIC | Age: 57
End: 2022-09-02

## 2022-09-06 ENCOUNTER — DOCUMENTATION ONLY (OUTPATIENT)
Dept: INTERNAL MEDICINE | Facility: CLINIC | Age: 57
End: 2022-09-06

## 2022-09-06 NOTE — PROGRESS NOTES
Type of Form Received: [employer]    Form Received (Date) 9/2/22   Form Filled out yes   Placed in provider folder Yes

## 2022-09-07 ENCOUNTER — TELEPHONE (OUTPATIENT)
Dept: INTERNAL MEDICINE | Facility: CLINIC | Age: 57
End: 2022-09-07

## 2022-09-07 ENCOUNTER — MEDICAL CORRESPONDENCE (OUTPATIENT)
Dept: INTERNAL MEDICINE | Facility: CLINIC | Age: 57
End: 2022-09-07

## 2022-09-07 NOTE — TELEPHONE ENCOUNTER
The form is in the provider's folder to be completed.    Princess Martinez on 9/7/2022 at 2:53 PM

## 2022-09-07 NOTE — TELEPHONE ENCOUNTER
[facility] Call [facility]    Phone Message    May a detailed message be left on voicemail: yes     Reason for Call: Other: Patient is calling since he has not heard back yet about forms for [employer]. Please call and discuss     Action Taken: Message routed to:  [facility]    Travel Screening: Not Applicable

## 2022-09-10 ENCOUNTER — HEALTH MAINTENANCE LETTER (OUTPATIENT)
Age: 57
End: 2022-09-10

## 2022-09-21 ENCOUNTER — VIRTUAL VISIT (OUTPATIENT)
Dept: PSYCHOLOGY | Facility: CLINIC | Age: 57
End: 2022-09-21
Attending: INTERNAL MEDICINE
Payer: MEDICARE

## 2022-09-21 DIAGNOSIS — F43.23 ADJUSTMENT DISORDER WITH MIXED ANXIETY AND DEPRESSED MOOD: ICD-10-CM

## 2022-09-21 DIAGNOSIS — Z91.199 NO-SHOW FOR APPOINTMENT: Primary | ICD-10-CM

## 2022-09-21 NOTE — PROGRESS NOTES
Patient not yet connected to video visit as of 2:06pm. Called patient to check-in about appointment. No answer. Left message stating that I was calling to touch base about our scheduled video visit and to offer assistance if he needs support getting connected. Invited patient to return my call or send me a MyChart message if he needs to reschedule and left my number. Will remain connected to video visit until 2:15 in hopes patient will be able to connect.    Patient did not connect to visit by 2:15. Disconnected and will send MyChart message to facilitate rescheduling.     Carolina Martinez, Ph.D., , [facility]  Clinical Health Psychologist  Phone: [phone]   Pager: [phone]  9/21/2022 2:16 PM        This patient was a no show for this scheduled appointment.

## 2022-09-23 ENCOUNTER — HOSPITAL ENCOUNTER (INPATIENT)
Facility: CLINIC | Age: 57
LOS: 1 days | Discharge: HOME | DRG: 534 | End: 2022-09-24
Attending: FAMILY MEDICINE | Admitting: INTERNAL MEDICINE
Payer: MEDICARE

## 2022-09-23 ENCOUNTER — APPOINTMENT (OUTPATIENT)
Dept: GENERAL RADIOLOGY | Facility: CLINIC | Age: 57
DRG: 534 | End: 2022-09-23
Attending: PHYSICIAN ASSISTANT
Payer: MEDICARE
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
## 2022-09-23 DIAGNOSIS — N39.0 URINARY TRACT INFECTION WITHOUT HEMATURIA, SITE UNSPECIFIED: Primary | ICD-10-CM

## 2022-09-23 DIAGNOSIS — S72.8X2A OTHER FRACTURE OF LEFT FEMUR, INITIAL ENCOUNTER FOR CLOSED FRACTURE (H): ICD-10-CM

## 2022-09-23 DIAGNOSIS — Z20.822 LAB TEST NEGATIVE FOR COVID-19 VIRUS: ICD-10-CM

## 2022-09-23 PROCEDURE — 120N000002 HC R&B MED SURG/OB UMMC

## 2022-09-23 PROCEDURE — 99285 EMERGENCY DEPT VISIT HI MDM: CPT | Performed by: EMERGENCY MEDICINE

## 2022-09-23 PROCEDURE — U0005 INFEC AGEN DETEC AMPLI PROBE: HCPCS | Performed by: EMERGENCY MEDICINE

## 2022-09-23 PROCEDURE — 99285 EMERGENCY DEPT VISIT HI MDM: CPT | Mod: 25 | Performed by: EMERGENCY MEDICINE

## 2022-09-23 PROCEDURE — 250N000013 HC RX MED GY IP 250 OP 250 PS 637: Performed by: PHYSICIAN ASSISTANT

## 2022-09-23 PROCEDURE — C9803 HOPD COVID-19 SPEC COLLECT: HCPCS | Performed by: EMERGENCY MEDICINE

## 2022-09-23 PROCEDURE — 99219 PR INITIAL OBSERVATION CARE,LEVEL II: CPT | Performed by: PHYSICIAN ASSISTANT

## 2022-09-23 PROCEDURE — 73552 X-RAY EXAM OF FEMUR 2/>: CPT | Mod: LT

## 2022-09-23 PROCEDURE — 73562 X-RAY EXAM OF KNEE 3: CPT | Mod: LT

## 2022-09-23 PROCEDURE — 73552 X-RAY EXAM OF FEMUR 2/>: CPT | Mod: 26 | Performed by: RADIOLOGY

## 2022-09-23 PROCEDURE — 73562 X-RAY EXAM OF KNEE 3: CPT | Mod: 26 | Performed by: RADIOLOGY

## 2022-09-23 RX ORDER — ACETAMINOPHEN 500 MG
1000 TABLET ORAL 3 TIMES DAILY
Status: DISCONTINUED | OUTPATIENT
Start: 2022-09-23 | End: 2022-09-24 | Stop reason: HOSPADM

## 2022-09-23 RX ORDER — OXYCODONE HYDROCHLORIDE 5 MG/1
5 TABLET ORAL ONCE
Status: DISCONTINUED | OUTPATIENT
Start: 2022-09-23 | End: 2022-09-23

## 2022-09-23 RX ORDER — ONDANSETRON 4 MG/1
4 TABLET, ORALLY DISINTEGRATING ORAL EVERY 6 HOURS PRN
Status: DISCONTINUED | OUTPATIENT
Start: 2022-09-23 | End: 2022-09-24 | Stop reason: HOSPADM

## 2022-09-23 RX ORDER — ONDANSETRON 2 MG/ML
4 INJECTION INTRAMUSCULAR; INTRAVENOUS EVERY 6 HOURS PRN
Status: DISCONTINUED | OUTPATIENT
Start: 2022-09-23 | End: 2022-09-24 | Stop reason: HOSPADM

## 2022-09-23 RX ORDER — OXYCODONE HYDROCHLORIDE 5 MG/1
5 TABLET ORAL ONCE
Status: COMPLETED | OUTPATIENT
Start: 2022-09-23 | End: 2022-09-23

## 2022-09-23 RX ORDER — DOCUSATE SODIUM 100 MG/1
100 CAPSULE, LIQUID FILLED ORAL 2 TIMES DAILY
Status: DISCONTINUED | OUTPATIENT
Start: 2022-09-23 | End: 2022-09-24

## 2022-09-23 RX ORDER — OXYCODONE HYDROCHLORIDE 5 MG/1
5-10 TABLET ORAL EVERY 4 HOURS PRN
Status: DISCONTINUED | OUTPATIENT
Start: 2022-09-23 | End: 2022-09-24 | Stop reason: HOSPADM

## 2022-09-23 RX ADMIN — Medication 1 MG: at 22:54

## 2022-09-23 RX ADMIN — DOCUSATE SODIUM 100 MG: 100 CAPSULE, LIQUID FILLED ORAL at 22:54

## 2022-09-23 RX ADMIN — OXYCODONE 5 MG: 5 TABLET ORAL at 18:55

## 2022-09-23 RX ADMIN — ACETAMINOPHEN 1000 MG: 500 TABLET ORAL at 22:54

## 2022-09-23 RX ADMIN — OXYCODONE 5 MG: 5 TABLET ORAL at 21:46

## 2022-09-23 RX ADMIN — GABAPENTIN 400 MG: 100 CAPSULE ORAL at 22:54

## 2022-09-23 ASSESSMENT — ACTIVITIES OF DAILY LIVING (ADL)
ADLS_ACUITY_SCORE: 35

## 2022-09-23 NOTE — ED TRIAGE NOTES
Pt reports slipping while self transferring in shower and felt a pop in left thigh/knee area. PA saw pt in triage, pulses intact.

## 2022-09-23 NOTE — ED PROVIDER NOTES
"ED Provider Note  [facility]      History     Chief Complaint   Patient presents with     Leg Injury     HPI  Arnold Duke is a 57 year old male past medical history significant for paraplegia, cauda equina syndrome who presents to the emergency department today with concerns for left knee pain after injury.  Patient presents alone.  Earlier today he was taking a shower, tells me that he was trying to transfer himself to his shower bench.  In the process, he tells me that he twisted his left leg in an odd fashion, felt an abrupt onset \"pop\" and since has had some pain in the left knee region.  Patient denies any direct trauma or any other injury.  He has baseline lower extremity numbness, reporting some increased pain sensation since the episode occurred.  Patient tells me he has had bone density difficulties in the past, he currently takes vitamin D daily.  He denies any history of knee problems in the past.  Patient is otherwise independent at home.    Past Medical History  No past medical history on file.  Past Surgical History:   Procedure Laterality Date     BIOPSY       GASTROSTOMY, INSERT TUBE, COMBINED       IMPLANT SHUNT VENTRICULOPERITONEAL      due to hydrocephalus from meningitis     [facility]  11/21/2014          FLUCONAZOLE PO  gabapentin (NEURONTIN) 300 MG capsule  gabapentin (NEURONTIN) 400 MG capsule  Lubricants (LUBRICATING JELLY EX)  VITAMIN D, CHOLECALCIFEROL, PO      No Known Allergies  Family History  No family history on file.  Social History   Social History     Tobacco Use     Smoking status: Former Smoker     Smokeless tobacco: Never Used      Past medical history, past surgical history, medications, allergies, family history, and social history were reviewed with the patient. No additional pertinent items.       Review of Systems  A complete review of systems was performed with pertinent positives and negatives noted in the HPI, and all other systems " "negative.    Physical Exam   BP: 120/74  Pulse: 105  Temp: 98.3  F (36.8  C)  Resp: 18  Height: 175.3 cm (5' 9\")  Weight: 78 kg (172 lb)  SpO2: 100 %  Physical Exam    GENERAL APPEARANCE: The patient is well developed, well appearing, and in no acute distress.  HEAD:  Normocephalic and atraumatic.   EENT: Voice normal.  NECK: Trachea is midline.  EXTREMITIES: Left knee with notable effusion, pain with extension.  Patient has no other tenderness to palpation of the foot ankle shin on my exam.  Appreciate no lower extremity swelling.  Patient does report some mild tenderness to palpation over the anterior aspect of the left femur, no tenderness to palpation of the hips bilaterally.  NEUROLOGIC: No focal sensory or motor deficits are noted.  Patient does have some intact sensation to the distal aspect left lower extremity.  PSYCHIATRIC: The patient is awake, alert.  Appropriate mood and affect.  SKIN: Warm, dry, and well perfused. Good turgor.  PERIPHERAL VASCULAR: PT pulse 2+ on the left    ED Course         Results for orders placed or performed during the hospital encounter of 09/23/22   XR Knee Left 3 Views     Status: None    Narrative    EXAM: XR KNEE LEFT 3 VIEWS  LOCATION: [facility]  DATE/TIME: 9/23/2022 6:09 PM    INDICATION: Injury, pain, swelling.  COMPARISON: None.      Impression    IMPRESSION: There is a comminuted intra-articular fracture of the diaphysis and distal metaphysis and distal epiphysis of the femur. There is only 1 or 2 mm of displacement. There is a lipohemarthrosis distending the knee joint. The fracture extends at   least 22 cm proximal to the joint line, but may extend further cephalad beyond the field-of-view. Otherwise negative.   XR Femur Left 2 Views     Status: None    Narrative    EXAM: XR FEMUR LEFT 2 VIEWS  LOCATION: [facility]  DATE/TIME: 9/23/2022 6:09 PM    INDICATION: Pain, " injury.  COMPARISON: None.      Impression    IMPRESSION:   1. Minimally displaced comminuted intra-articular fracture of the distal half of the femur. This involves the distal 20 cm or so of the femur. There is a lipohemarthrosis distending the knee joint.  2. Small mildly displaced fracture of the superior aspect of the greater trochanter, age indeterminate.  3. There is also deformity of the inferior pubic rami, likely developmental, and there is diffuse bone demineralization.      Medications   oxyCODONE (ROXICODONE) tablet 5 mg (has no administration in time range)   oxyCODONE (ROXICODONE) tablet 5 mg (5 mg Oral Given 9/23/22 6084)        Assessments & Plan (with Medical Decision Making)   This is a 57-year-old male history of quadriplegia presenting with concerns for left knee pain after twisting the knee transferring to his shower.  On presentation patient is slightly tachycardic around 105.  Physical exam shows notable left knee effusion, pain with extension of the knee.  He does have good circulation in the leg with good intact PT pulse.  I discussed with him recommendations for imaging and x-rays of the left knee and femur were obtained.  Patient states he is worried about his femur as he has a history of prior right femur fracture, on exam he does have some mild tenderness here and therefore that imaging was ordered as well.    Radiographs returned showing a minimally displaced comminuted intra-articular distal femur fracture with a small mildly displaced fracture of the superior aspect of the greater trochanter with unknown chronicity.  This case was discussed with orthopedics, we did evaluate the patient in the emergency department.  They recommend patient be admitted for PT consult, and pain control with knee immobilizer, leg in full extension.  I discussed this with the patient, he is in agreement with the care plan.  Patient to be admitted to the observation emergency department unit.    Patient has  no other questions or concerns at this time.  Red flag signs were addressed, and they were in agreement with the patient care plan provided.    Case discussed with Dr. Driscoll, agrees with plan of care.    I have reviewed the nursing notes. I have reviewed the findings, diagnosis, plan and need for follow up with the patient.    New Prescriptions    No medications on file       Final diagnoses:   Other fracture of left femur, initial encounter for closed fracture (H)     --  Jazmyn Etienne, PAC  [facility] EMERGENCY DEPARTMENT  9/23/2022    --    ED Attending Physician Attestation    I Kelle Driscoll MD, cared for this patient with the Advanced Practice Provider (JANES). I have performed a history and physical examination of the patient independent of the JANES. I reviewed the JANES's documentation above and agree with the documented findings and plan of care. I personally provided a substantive portion of the care for this patient.    I personally performed the substantive portion of the medical decision making for this visit - please see the JANES's documentation for full details.    Key management decisions made by me and carried out under my direction: Xrays reviewed. Orthopedics consult and follow up on recommendations.     Summary of HPI, PE, ED Course   Patient is a 57 year old male evaluated in the emergency department for Left knee pain and swelling after twisting it getting into shower. Exam notable for knee tenderness and joint effusion. ED course notable for xray with femur fracture. Ortho consulted and placed in knee immobilizer. After the completion of care in the emergency department, the patient was admitted to observation. Will need PT/OT and to ensure his home cares and wheelchair needs are met.     Critical Care & Procedures  Not applicable.    Medical Decision Making  The medical record was reviewed and interpreted.  Current images reviewed and interpreted: See above.      Kelle Da Silva  MD [facility]  Emergency Medicine          Kelle Driscoll MD  09/23/22 2465

## 2022-09-24 ENCOUNTER — APPOINTMENT (OUTPATIENT)
Dept: PHYSICAL THERAPY | Facility: CLINIC | Age: 57
DRG: 534 | End: 2022-09-24
Payer: MEDICARE

## 2022-09-24 VITALS
RESPIRATION RATE: 18 BRPM | OXYGEN SATURATION: 100 % | HEART RATE: 100 BPM | BODY MASS INDEX: 25.48 KG/M2 | TEMPERATURE: 98.6 F | WEIGHT: 172 LBS | DIASTOLIC BLOOD PRESSURE: 52 MMHG | HEIGHT: 69 IN | SYSTOLIC BLOOD PRESSURE: 102 MMHG

## 2022-09-24 LAB
ALBUMIN UR-MCNC: 20 MG/DL
APPEARANCE UR: ABNORMAL
BACTERIA #/AREA URNS HPF: ABNORMAL /HPF
BILIRUB UR QL STRIP: NEGATIVE
COLOR UR AUTO: YELLOW
GLUCOSE UR STRIP-MCNC: NEGATIVE MG/DL
HGB UR QL STRIP: ABNORMAL
KETONES UR STRIP-MCNC: NEGATIVE MG/DL
LEUKOCYTE ESTERASE UR QL STRIP: ABNORMAL
MUCOUS THREADS #/AREA URNS LPF: PRESENT /LPF
NITRATE UR QL: POSITIVE
PH UR STRIP: 5.5 [PH] (ref 5–7)
RBC URINE: 5 /HPF
SARS-COV-2 RNA RESP QL NAA+PROBE: NEGATIVE
SP GR UR STRIP: 1.03 (ref 1–1.03)
TRANSITIONAL EPI: <1 /HPF
UROBILINOGEN UR STRIP-MCNC: NORMAL MG/DL
WBC CLUMPS #/AREA URNS HPF: PRESENT /HPF
WBC URINE: 100 /HPF

## 2022-09-24 PROCEDURE — 999N000147 HC STATISTIC PT IP EVAL DEFER

## 2022-09-24 PROCEDURE — 250N000013 HC RX MED GY IP 250 OP 250 PS 637: Performed by: PHYSICIAN ASSISTANT

## 2022-09-24 PROCEDURE — 99217 PR OBSERVATION CARE DISCHARGE: CPT | Performed by: INTERNAL MEDICINE

## 2022-09-24 PROCEDURE — 81001 URINALYSIS AUTO W/SCOPE: CPT | Performed by: PHYSICIAN ASSISTANT

## 2022-09-24 PROCEDURE — 87088 URINE BACTERIA CULTURE: CPT | Performed by: PHYSICIAN ASSISTANT

## 2022-09-24 PROCEDURE — 250N000013 HC RX MED GY IP 250 OP 250 PS 637

## 2022-09-24 RX ORDER — AMOXICILLIN 250 MG
2 CAPSULE ORAL 2 TIMES DAILY
Status: DISCONTINUED | OUTPATIENT
Start: 2022-09-24 | End: 2022-09-24 | Stop reason: HOSPADM

## 2022-09-24 RX ORDER — CEFTRIAXONE 1 G/1
1 INJECTION, POWDER, FOR SOLUTION INTRAMUSCULAR; INTRAVENOUS EVERY 24 HOURS
Status: DISCONTINUED | OUTPATIENT
Start: 2022-09-24 | End: 2022-09-24

## 2022-09-24 RX ORDER — POLYETHYLENE GLYCOL 3350 17 G/17G
17 POWDER, FOR SOLUTION ORAL DAILY
Status: DISCONTINUED | OUTPATIENT
Start: 2022-09-24 | End: 2022-09-24 | Stop reason: HOSPADM

## 2022-09-24 RX ORDER — AMOXICILLIN 250 MG
2 CAPSULE ORAL 2 TIMES DAILY PRN
Status: ON HOLD | COMMUNITY
Start: 2022-09-24 | End: 2022-10-01

## 2022-09-24 RX ORDER — OXYCODONE HYDROCHLORIDE 5 MG/1
5 TABLET ORAL EVERY 6 HOURS PRN
Qty: 15 TABLET | Refills: 0 | Status: ON HOLD | OUTPATIENT
Start: 2022-09-24 | End: 2022-10-01

## 2022-09-24 RX ORDER — POLYETHYLENE GLYCOL 3350 17 G/17G
17 POWDER, FOR SOLUTION ORAL DAILY PRN
Qty: 510 G | Status: ON HOLD | COMMUNITY
Start: 2022-09-24 | End: 2022-10-01

## 2022-09-24 RX ORDER — CEFDINIR 300 MG/1
300 CAPSULE ORAL EVERY 12 HOURS SCHEDULED
Status: DISCONTINUED | OUTPATIENT
Start: 2022-09-24 | End: 2022-09-24 | Stop reason: HOSPADM

## 2022-09-24 RX ORDER — CEFDINIR 300 MG/1
300 CAPSULE ORAL EVERY 12 HOURS
Qty: 28 CAPSULE | Refills: 0 | Status: SHIPPED | OUTPATIENT
Start: 2022-09-24 | End: 2022-10-08

## 2022-09-24 RX ORDER — ACETAMINOPHEN 500 MG
1000 TABLET ORAL 3 TIMES DAILY
Status: ON HOLD | COMMUNITY
Start: 2022-09-24 | End: 2022-10-01

## 2022-09-24 RX ADMIN — OXYCODONE HYDROCHLORIDE 10 MG: 5 TABLET ORAL at 02:21

## 2022-09-24 RX ADMIN — CEFDINIR 300 MG: 300 CAPSULE ORAL at 12:17

## 2022-09-24 ASSESSMENT — ACTIVITIES OF DAILY LIVING (ADL)
EQUIPMENT_CURRENTLY_USED_AT_HOME: WHEELCHAIR, MANUAL
DEPENDENT_IADLS:: INDEPENDENT;TRANSPORTATION
WALKING_OR_CLIMBING_STAIRS: OTHER (SEE COMMENTS)
DIFFICULTY_EATING/SWALLOWING: NO
ADLS_ACUITY_SCORE: 26
TRANSFERRING: 0-->INDEPENDENT
TOILETING: 0-->INDEPENDENT
TOILETING_ASSISTANCE: TOILETING DIFFICULTY, REQUIRES EQUIPMENT
CONCENTRATING,_REMEMBERING_OR_MAKING_DECISIONS_DIFFICULTY: NO
FALL_HISTORY_WITHIN_LAST_SIX_MONTHS: YES
TRANSFERRING: 0-->INDEPENDENT
ADLS_ACUITY_SCORE: 35
WALKING_OR_CLIMBING_STAIRS_DIFFICULTY: YES
TOILETING_ISSUES: YES;OTHER (SEE COMMENTS)
WEAR_GLASSES_OR_BLIND: NO
DOING_ERRANDS_INDEPENDENTLY_DIFFICULTY: YES
DRESSING/BATHING_DIFFICULTY: NO
ADLS_ACUITY_SCORE: 26
CHANGE_IN_FUNCTIONAL_STATUS_SINCE_ONSET_OF_CURRENT_ILLNESS/INJURY: NO
TOILETING: 0-->INDEPENDENT
NUMBER_OF_TIMES_PATIENT_HAS_FALLEN_WITHIN_LAST_SIX_MONTHS: 1
ADLS_ACUITY_SCORE: 26
ADLS_ACUITY_SCORE: 35

## 2022-09-24 NOTE — PROGRESS NOTES
"Patient interviewed and examined. Labs, imaging and chart notes reviewed.  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 He is wheelchair bound though can support some weight for transfers. He sustained while transferring in the shower yesterday morning. He stated the left leg twisted and her felt a popping sensation. He was found to have a minimally displaced spiral fracture of the distal femur with extension into the lateral condyle. After evaluation and discussion with the orthopedics consultant he elected non operative management rather than surgical fixation. He denies other problems. On admission to the observation unit he was found to have pyuria. He states his urine was dark yesterday, however he is not experiencing any pain, leakage, fever, chills, flank pain or other symptoms of UTI. He straight catheterizes 4 times/ day at home.     He will need to be non weight bearing on the left leg for 4-6 weeks and will need to keep the left knee in extension in a knee immobilizer. Leg elevation is recommended while in his wheelchair. The agency which provided his wheelchair could not be contacted on the weekend, nor could [facility] services. He is unwilling to stay in observation until this can be arranged. He is unwilling to consider TCU. He has a son who is his PCA who he states can assist him at home.    No past medical history on file.    Past Surgical History:   Procedure Laterality Date     BIOPSY       GASTROSTOMY, INSERT TUBE, COMBINED       IMPLANT SHUNT VENTRICULOPERITONEAL      due to hydrocephalus from meningitis     [facility]  11/21/2014            No family history on file.    Social History     Tobacco Use     Smoking status: Former Smoker     Smokeless tobacco: Never Used   Substance Use Topics     Alcohol use: Not on file     Physical exam:  Middle aged male in NAD.  /52 (BP Location: Left arm)   Pulse 100   Temp 98.6  F (37  C) (Oral)   Resp 18   Ht 1.753 m (5' 9\")  "  Wt 78 kg (172 lb)   SpO2 100%   BMI 25.40 kg/m    HEENT: PERRLA. EOMI.  Lungs clear.   Cardiac: RRR. Normal S1 and S2.  Abdomen: Soft.  Back: NO CVA tenderness.  LLE in knee immobilizer. Minimal edema. !+ DP and 2+PT pulse. Toe temperature and capillary refill normal.    Labs/Imaging    Results for orders placed or performed during the hospital encounter of 09/23/22 (from the past 24 hour(s))   XR Knee Left 3 Views    Narrative    EXAM: XR KNEE LEFT 3 VIEWS  LOCATION: [facility]  DATE/TIME: 9/23/2022 6:09 PM    INDICATION: Injury, pain, swelling.  COMPARISON: None.      Impression    IMPRESSION: There is a comminuted intra-articular fracture of the diaphysis and distal metaphysis and distal epiphysis of the femur. There is only 1 or 2 mm of displacement. There is a lipohemarthrosis distending the knee joint. The fracture extends at   least 22 cm proximal to the joint line, but may extend further cephalad beyond the field-of-view. Otherwise negative.   XR Femur Left 2 Views    Narrative    EXAM: XR FEMUR LEFT 2 VIEWS  LOCATION: [facility]  DATE/TIME: 9/23/2022 6:09 PM    INDICATION: Pain, injury.  COMPARISON: None.      Impression    IMPRESSION:   1. Minimally displaced comminuted intra-articular fracture of the distal half of the femur. This involves the distal 20 cm or so of the femur. There is a lipohemarthrosis distending the knee joint.  2. Small mildly displaced fracture of the superior aspect of the greater trochanter, age indeterminate.  3. There is also deformity of the inferior pubic rami, likely developmental, and there is diffuse bone demineralization.    Asymptomatic COVID-19 Virus (Coronavirus) by PCR Nasopharyngeal    Specimen: Nasopharyngeal; Swab   Result Value Ref Range    SARS CoV2 PCR Negative Negative    Narrative    Testing was performed using the Xpert Xpress SARS-CoV-2 Assay on the  [employer] Systems. Additional information about  this Emergency Use Authorization (EUA) assay can be found via the Lab  Guide. This test should be ordered for the detection of SARS-CoV-2 in  individuals who meet SARS-CoV-2 clinical and/or epidemiological  criteria. Test performance is unknown in asymptomatic patients. This  test is for in vitro diagnostic use under the FDA EUA for  laboratories certified under CLIA to perform high complexity testing.  This test has not been FDA cleared or approved. A negative result  does not rule out the presence of PCR inhibitors in the specimen or  target RNA in concentration below the limit of detection for the  assay. The possibility of a false negative should be considered if  the patient's recent exposure or clinical presentation suggests  COVID-19. This test was validated by the [facility]. This laboratory is certified under  the Clinical Laboratory Improvement Amendments of 1988 (CLIA-88) as  qualified to perform high complexity laboratory testing.     UA with Microscopic reflex to Culture    Specimen: Urine, NOS   Result Value Ref Range    Color Urine Yellow Colorless, Straw, Light Yellow, Yellow    Appearance Urine Slightly Cloudy (A) Clear    Glucose Urine Negative Negative mg/dL    Bilirubin Urine Negative Negative    Ketones Urine Negative Negative mg/dL    Specific Gravity Urine 1.027 1.003 - 1.035    Blood Urine Small (A) Negative    pH Urine 5.5 5.0 - 7.0    Protein Albumin Urine 20  (A) Negative mg/dL    Urobilinogen Urine Normal Normal, 2.0 mg/dL    Nitrite Urine Positive (A) Negative    Leukocyte Esterase Urine Large (A) Negative    Bacteria Urine Many (A) None Seen /HPF    WBC Clumps Urine Present (A) None Seen /HPF    Mucus Urine Present (A) None Seen /LPF    RBC Urine 5 (H) <=2 /HPF    WBC Urine 100 (H) <=5 /HPF    Transitional Epithelials Urine <1 <=1 /HPF    Narrative    Urine Culture ordered  based on laboratory criteria     Impression:  1: Spiral fracture of the left distal femur in patient with osteopenia and paraplegia. He has elected non operative management after extensive discussion with the orthopedic consultant. He needs wheelchair leg lift but is unwilling to consider TCU placement. We are unable to located a DME provider to modify his custom wheelchair over the weekend and he is unwilling to wait until Monday for discharge. He is also unwilling to consider TCU placement or work with PT. Adjustable leg lifts from hospital wheelchairs do not fit his custom chair. He will be discharged today at his request. Referral sent for DME and ortho follow up. I reinforced that he must keep the extremity in the knee immobilizer and must remain completely non weight bearing on the LLE.    2. Pyuria without other symptoms of UTI. He declined blood draw. It is unclear if this represents UTI vs colonization. UC pending. He has been treated with Cefdinir pending .    Jose Palumbo MD

## 2022-09-24 NOTE — CONSULTS
Care Management Initial Consult      Admission Information and Discharge Needs:  57-year-old male with past medical history notable for paraplegia who presents for evaluation of left knee pain, found to have an incomplete, minimally displaced spiral distal femur fracture.     Patient was seen in the ED by Orthopedic Surgeon. Patient is electing nonoperative management with a knee immobilizer and nonweightbearing for approximately 4-6 weeks.      -Per Ortho:              *PT, wheelchair with leg rest              *NWB LLE              *Knee immobilizer at all times, leg in full extension X 1 week              *PO pain control, rest, ice, elevation              *Follow-up in 1 week with repeat radiographs      Patients manual wheelchair was supplied through [employer]. Leg rest attachment will need to be obtained through this same supplier to ensure proper fit and safety with use of his manual wheelchair. Unable to obtain this attachment on the weekend, [employer] is only open Monday through Friday. Notified ED/OBS JANES and therapist's.      General Information  Assessment completed with:  Chart review, ED/OBS JANES, Therapy  Primary Care Provider verified: yes     Readmission within the last 30 days:     Advance Care Planning: no scanned documents on file     Communication Assessment  Patient's communication style: spoken language English    Hearing Difficulty or Deaf: no   Wear Glasses or Blind: no    Cognitive  Cognitive/Neuro/Behavioral: WDL                      Living Environment:   People in home: alone     Current living Arrangements: apartment      Able to return to prior arrangements: TBD, need therpay eval to determine     Family/Social Support:  Care provided by: self, states he is independent at home. His son is his PCA and lives close by.  Provides care for: no one  Marital Status:   Description of Support System: Adult son Supportive, Involved         Current Resources:   Patient receiving home care  services: No  Community Resources: PCA  Equipment currently used at home: wheelchair manual  Patients manual w/c was supplied by [employer]  Address: [address] [place] [address] [address], [place] [address]  Hours: Mon-Fri 8-430pm  Phone: [phone]  Supplies currently used at home: Other (baseline straight cath's)    Employment/Financial:  Employment Status: disabled        Financial Concerns: No concerns identified   Referral to Financial Worker: No       Lifestyle & Psychosocial Needs:  Social Determinants of Health     Tobacco Use: Medium Risk     Smoking Tobacco Use: Former Smoker     Smokeless Tobacco Use: Never Used   Alcohol Use: Not on file   Financial Resource Strain: Not on file   Food Insecurity: Not on file   Transportation Needs: Not on file   Physical Activity: Not on file   Stress: Not on file   Social Connections: Not on file   Intimate Partner Violence: Not on file   Depression: Not at risk     PHQ-2 Score: 2   Housing Stability: Not on file       Functional Status:  Prior to admission patient needed assistance:   Dependent ADLs:: Wheelchair-independent  Dependent IADLs:: Independent, Transportation  Assesssment of Functional Status: Not at baseline with mobility            Liseth Pablo RN, BSN, PHN  Nurse Care Coordinator  ED/OBS Unit, Phone [phone]  [facility]

## 2022-09-24 NOTE — H&P
"[facility]    History and Physical - ED Observation Service      Date of Admission:  9/23/2022    Assessment & Plan      Arnold Duke is a 57 year old male admitted on 9/23/2022. 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      ##. Incomplete, Minimally Displaced Spiral Distal Femur Fracture: Reports today he was trying to transfer himself to his shower bench and in the process WC probably moved a bit, twisted his left leg in an odd fashion, felt an abrupt onset \"pop\" and since has had some pain in the left knee. No direct trauma or any other injury. Reporting increased pain sensation since the episode occurred. States he is independent at home. His son is his PCA and lives close by. In ED, HR , BP /50-74, RR 18, SaO2 100% on RA, Temp 98.3  F . No labs done in ED (Patient does not want to be stuck). Left knee xray reports a comminuted intra-articular fracture of the diaphysis and distal metaphysis and distal epiphysis of the femur, 1 or 2 mm of displacement; lipohemarthrosis distending the knee joint; fracture extends at least 22 cm proximal to the joint line, but may extend further cephalad beyond the field-of-view. Left Femur xray reports minimally displaced comminuted intra-articular fracture of the distal half of the femur, involves the distal 20 cm or so of the femur; small mildly displaced fracture of the superior aspect of the greater trochanter, age indeterminate; deformity of the inferior pubic rami, likely developmental, and there is diffuse bone demineralization. In " "the ED the patient was given oxycodone 5mg po x 1 and reports pain is now at 8/10.  Patient was seen in the ED by Orthopedic Surgery who recommended admission for PT and wheelchair with leg rest, pain control and follow up in one week.   -Oxycodone 5-10mg q4h prn  -Senna-2 2 tabs BID and Miralax daily while on narcotics  -Per Ortho:   *PT, wheelchair with leg rest   *NWB LLE   *Knee immobilizer at all times, leg in full extension X 1 week   *PO pain control, rest, ice, elevation   *Follow-up in 1 week with repeat radiographs    ##. Neurogenic Bladder: -Straight cath q4-6h     Diet: Regular Diet Adult    DVT Prophylaxis: Pneumatic Compression Devices  Carrion Catheter: Not present  Central Lines: None  Cardiac Monitoring: None  Code Status: Full Code      Clinically Significant Risk Factors Present on Admission                    # Overweight: Estimated body mass index is 25.4 kg/m  as calculated from the following:    Height as of this encounter: 1.753 m (5' 9\").    Weight as of this encounter: 78 kg (172 lb).        Disposition Plan      Expected Discharge Date: 09/24/2022                The patient's care was discussed with the Attending Physician, Dr. Palumbo.    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      Per ED Note: \"Patient presents alone.  Earlier today he was taking a shower, tells me that he was trying to transfer himself to his shower bench.  In the process, he tells me that he twisted his left leg in an odd fashion, felt an abrupt onset \"pop\" and since has had some pain in the left knee region.  Patient denies any direct trauma or any other injury.  He has baseline lower extremity numbness, reporting some increased pain sensation since the episode occurred.  Patient tells me he has had bone density difficulties in the past, he currently takes vitamin D daily.  He denies any history of knee problems in the past. Patient is otherwise independent at home.\"    In the ED, HR , BP /50-74, RR 18, SaO2 100% on RA, Temp 98.3  F . No labs done. Left knee xray reports a comminuted intra-articular fracture of the diaphysis and distal metaphysis and distal epiphysis of the femur, 1 or 2 mm of displacement; lipohemarthrosis distending the knee joint; fracture extends at least 22 cm proximal to the joint line, but may extend further cephalad beyond the field-of-view. Left Femur xray reports minimally displaced comminuted intra-articular fracture of the distal half of the femur, involves the distal 20 cm or so of the femur; small mildly displaced fracture of the superior aspect of the greater trochanter, age indeterminate; deformity of the inferior pubic rami, likely developmental, and there is diffuse bone demineralization. In the ED the patient was given oxycodone 5mg po x 1 and reports pain is now at 8/10.  Patient was seen in the ED by Orthopedic Surgery who recommended admission for PT and wheelchair with leg rest, pain control and follow up in one week.     Review of Systems    All other ROS negative except those mentioned in above note.      Past Medical History    I have reviewed this patient's medical history and " updated it with pertinent information if needed.   No past medical history on file.    Past Surgical History   I have reviewed this patient's surgical history and updated it with pertinent information if needed.  Past Surgical History:   Procedure Laterality Date     BIOPSY       GASTROSTOMY, INSERT TUBE, COMBINED       IMPLANT SHUNT VENTRICULOPERITONEAL      due to hydrocephalus from meningitis     PICC  11/21/2014            Social History   I have reviewed this patient's social history and updated it with pertinent information if needed.  Social History     Tobacco Use     Smoking status: Former Smoker     Smokeless tobacco: Never Used       Family History         Prior to Admission Medications   Prior to Admission Medications   Prescriptions Last Dose Informant Patient Reported? Taking?   FLUCONAZOLE PO   Yes No   Sig: Take 5 mg by mouth daily   Lubricants (LUBRICATING JELLY EX)   Yes No   Sig: APPLY JELLY AS DIRECTED FOR SELF CATHING AND BOWEL PROGRAM **USE SEPARATE TUBES TO AVOID CONTAMINATION   VITAMIN D, CHOLECALCIFEROL, PO   Yes No   Sig: Take 1,000 Units by mouth daily    gabapentin (NEURONTIN) 400 MG capsule   No No   Sig: Take 1 capsule (400 mg) by mouth 2 times daily   Patient taking differently: Take 400 mg by mouth daily as needed      Facility-Administered Medications: None     Allergies   No Known Allergies    Physical Exam   Vital Signs: Temp: 98.3  F (36.8  C) Temp src: Oral BP: 120/74 Pulse: 105   Resp: 18 SpO2: 100 % O2 Device: None (Room air)    Weight: 172 lbs 0 oz    Constitutional: awake, alert, cooperative, no apparent distress, and appears stated age  Eyes: Lids and lashes normal, pupils equal, round and reactive to light, extra ocular muscles intact, sclera clear, conjunctiva normal  ENT: Normocephalic, without obvious abnormality, atraumatic, sinuses nontender on palpation, external ears without lesions, oral pharynx with moist mucous membranes, tonsils without erythema or exudates, gums  normal and good dentition.  Hematologic / Lymphatic: no cervical lymphadenopathy  Respiratory: No increased work of breathing, good air exchange, clear to auscultation bilaterally, no crackles or wheezing  Cardiovascular: Normal apical impulse, regular rate and rhythm, normal S1 and S2, no S3 or S4, and no murmur noted  GI: No scars, normal bowel sounds, soft, non-distended, non-tender, no masses palpated, no hepatosplenomegally  Skin: no bruising or bleeding  Musculoskeletal:Left knee with effusion, TTP. TTP femur. No active ROM on LE. No other tenderness.   Neurologic: Awake, alert, oriented to name, place and time.  Cranial nerves II-XII are grossly intact.  Motor is 5 out of 5 bilaterally.  Cerebellar finger to nose, heel to shin intact.  Sensory is intact.  Babinski down going, Romberg negative, and gait is normal.  Neuropsychiatric: General: normal, calm and normal eye contact     Data   Data reviewed today: I reviewed all medications, new labs and imaging results over the last 24 hours. I personally reviewed    No lab results found in last 7 days.    N/A    \    N/A    /    N/A   N N/A    L N/A    N/A    N/A    N/A /   ------------------------------------ N/A   ALT N/A   AST N/A   AP N/A   ALB N/A   Ca N/A  N/A    N/A    N/A \    % RETIC N/A    LDH N/A  Troponin N/A    BNP N/A    CK N/A  INR N/A   PTT N/A    D-dimer N/A    Fibrinogen N/A    Antithrombin N/A  Ferritin N/A  CRP N/A    IL-6 N/A  Recent Results (from the past 24 hour(s))   XR Knee Left 3 Views    Narrative    EXAM: XR KNEE LEFT 3 VIEWS  LOCATION: [facility]  DATE/TIME: 9/23/2022 6:09 PM    INDICATION: Injury, pain, swelling.  COMPARISON: None.      Impression    IMPRESSION: There is a comminuted intra-articular fracture of the diaphysis and distal metaphysis and distal epiphysis of the femur. There is only 1 or 2 mm of displacement. There is a lipohemarthrosis distending the knee joint. The  fracture extends at   least 22 cm proximal to the joint line, but may extend further cephalad beyond the field-of-view. Otherwise negative.   XR Femur Left 2 Views    Narrative    EXAM: XR FEMUR LEFT 2 VIEWS  LOCATION: [facility]  DATE/TIME: 9/23/2022 6:09 PM    INDICATION: Pain, injury.  COMPARISON: None.      Impression    IMPRESSION:   1. Minimally displaced comminuted intra-articular fracture of the distal half of the femur. This involves the distal 20 cm or so of the femur. There is a lipohemarthrosis distending the knee joint.  2. Small mildly displaced fracture of the superior aspect of the greater trochanter, age indeterminate.  3. There is also deformity of the inferior pubic rami, likely developmental, and there is diffuse bone demineralization.

## 2022-09-24 NOTE — CONSULTS
[facility]  Orthopedic Surgery Consult    Name: Arnold Duke  Age: 57 year old  MRN: [id number]  YOB: 1965    Reason for Consult: Left leg pain    Requesting Provider: Jazmyn Clifton PA-C    Assessment and Plan:   Assessment:  57-year-old male with past medical history notable for paraplegia who presents for evaluation of left knee pain, found to have an incomplete, minimally displaced spiral distal femur fracture.      Had an extensive conversation regarding treatment options moving forward.  One option would entail nonoperative management with a knee immobilizer and nonweightbearing for approximately 4-6 weeks.  Operative management would entail plate fixation, largely in a prophylactic fashion.  The additional benefit of operative management would allow possible earlier weightbearing on the limb.  The patient reports he had a knee surgery for fracture on the contralateral limb and since this time he does feel that the knee is quite stiff and he would like to avoid that on his left side.  He elects to proceed with nonoperative management.  I further discussed with him that there is the possibility, although small, that the fracture may displace if he happens to sustain another fall, although he did challenge this limb after his injury today without displacement.  Additionally, if he finds that he is unable to perform his activities of daily living along a nonoperative pathway, we may have a further discussion in the future regarding operative management.  This plan was formulated with shared decision making.  All of his questions were answered to satisfaction.    Plan:  Recommend admission to medicine for physical therapy, wheelchair with leg rest, and pain control  NWB LLE  Knee immobilizer at all times, leg in full extension X 1 week  PO pain control, rest, ice, elevation  Follow-up in 1 week with repeat radiographs    Staff: Martinez Maxwell,  MD  Orthopedic Surgery PGY4    History of Present Illness:   This is a 57-year-old male with past medical history notable for paraplegia who presents for evaluation for left knee pain.  Reportedly, at approximately 10 AM today, the patient was transferring from the shower when he twisted his leg in an odd fashion and heard a pop in his knee.  Following this.  He was able to stand for approximately 1 hour at his walker.  Later in the day, he noted some knee swelling with associated pain and presented for evaluation.  Denies other injuries, pain generators, head trauma.    Patient was seen and examined by me. History, PMH, Meds, SH, complete ROS (10 organ systems) and PE reviewed with patient and prior medical records.       Past Medical History:     No past medical history on file.    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   Wheelchair dependent, does not ambulate  Describes self is very active, pool therapy, uses legs to pivot, transfer, and bear weight  Lives alone  Works in IT  Non-smoker, no ethanol    Family History:     No family history on file.    Medications:     Current Facility-Administered Medications   Medication     oxyCODONE (ROXICODONE) tablet 5 mg     Current Outpatient Medications   Medication Sig     FLUCONAZOLE PO Take 5 mg by mouth daily     gabapentin (NEURONTIN) 300 MG capsule Take 300 mg by mouth 3 times daily (Patient  "not taking: Reported on 7/29/2022)     gabapentin (NEURONTIN) 400 MG capsule Take 1 capsule (400 mg) by mouth 2 times daily     Lubricants (LUBRICATING JELLY EX) APPLY JELLY AS DIRECTED FOR SELF CATHING AND BOWEL PROGRAM **USE SEPARATE TUBES TO AVOID CONTAMINATION     VITAMIN D, CHOLECALCIFEROL, PO Take 1,000 Units by mouth daily        Allergies:     No Known Allergies    Review of Systems:     A comprehensive 10 point review of systems (constitutional, ENT, cardiac, peripheral vascular, respiratory, GI, , musculoskeletal, skin, neurological) was performed and found to be negative except as described in this note.      Physical Exam:     Vital Signs: /74   Pulse 105   Temp 98.3  F (36.8  C) (Oral)   Resp 18   Ht 1.753 m (5' 9\")   Wt 78 kg (172 lb)   SpO2 100%   BMI 25.40 kg/m    General: Resting comfortably in bed, awake, alert, cooperative, no apparent distress, appears stated age.  HEENT: Normocephalic, atraumatic, EOMI, no scleral icterus.  Cardiovascular: RRR assessed by peripheral pulse.  Respiratory: Breathing comfortably on room air, no wheezing.  Skin: No rashes or lesions.  Neurologic: A&Ox3, CN II-XII grossly intact  Musculoskeletal:  LLE: No gross deformity. Skin intact.  No appreciable lower extremity function.  Significantly decreased sensation distal to the tibial tubercle.  Mild knee swelling, tender about this area.  Dorsalis pedis and posterior tibial arteries 2+ and foot warm and well-perfused with <2 seconds capillary refill.     Imaging:   Radiographs of left knee and left femur dated 9/23/2022 demonstrate an incomplete, minimally displaced spiral fracture of the distal femur extending approximately 20 cm up the shaft.  No other acute osseous abnormalities.    Data:     CBC:  No results found for: WBC, HGB, PLT  BMP:  Lab Results   Component Value Date     03/02/2022    POTASSIUM 4.2 03/02/2022    CHLORIDE 106 03/02/2022    CO2 29 03/02/2022    BUN 12 03/02/2022    CR 0.82 " 03/02/2022    ANIONGAP 3 03/02/2022    SHAI 9.4 03/02/2022    GLC 85 03/02/2022     Inflammatory Markers:  No results found for: WBC, CRP, SED

## 2022-09-24 NOTE — PLAN OF CARE
"Goal Outcome Evaluation:    Plan of Care Reviewed With: patient     Overall Patient Progress: no change    -diagnostic tests and consults completed and resulted. Not met      -vital signs normal or at patient baseline. Met /52 (BP Location: Left arm)   Pulse 100   Temp 98.6  F (37  C) (Oral)   Resp 18   Ht 1.753 m (5' 9\")   Wt 78 kg (172 lb)   SpO2 100%   BMI 25.40 kg/m       -tolerating oral intake to maintain hydration. Met      -adequate pain control on oral analgesics. Met      -tolerating oral antibiotics or has plans for home infusion setup. N/A     -returns to baseline functional status. Not met      -safe disposition plan has been identified. Not met      -PT consult completed. Not met      - Ortho consult complete with dispo plan. Not met      "

## 2022-09-24 NOTE — PLAN OF CARE
Obs Physical Therapy - Orders received, chart reviewed, discussed with care team and met with patient. Educated pt on ortho recs and new mobility precautions/restrictions (NWB LLE, knee in immobilizer at all times, leg in full extension x 1 week, wheelchair with leg rest), pt verbalized understanding. Denied participation with PT despite multiple attempts, reporting they will be able to manage well at home despite new mobility precautions/restrictions. Pt is wheelchair bound at baseline, pivot or slide board transfers to/from surfaces. Pt does not have leg rest attachment for their manual wheelchair at this time, will need to be obtained through [employer], which does not open until Monday. If pt is to discharge home this date, recommend placing their slide board under their wheelchair cushion to support LLE in extension until leg rest can be obtained, pt verbalized understanding. Set-up wheelchair with L leg rest for pt to use while in hospital, left in room. Will complete consult and defer evaluation, please reconsult as appropriate if patient has decline in functional mobility requiring further skilled inpatient PT needs. Defer Discharge recommendations to medical team.      Vishal Corral, PT  Pager #[phone]

## 2022-09-24 NOTE — PROGRESS NOTES
Discharge instructions were given and explained to the patient. The patient verbalized understanding. The patient had no PIV. Patient discharged transported by [facility].

## 2022-09-24 NOTE — PROGRESS NOTES
"[facility]    Medicine Progress Note - ED Observation  Date of Admission:  9/23/2022    Assessment & Plan          Arnold Duke is a 57 year old male admitted on 9/23/2022. 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       ##. Incomplete, Minimally Displaced Spiral Distal Femur Fracture: Reports today he was trying to transfer himself to his shower bench and in the process WC probably moved a bit, twisted his left leg in an odd fashion, felt an abrupt onset \"pop\" and since has had some pain in the left knee. No direct trauma or any other injury. Reporting increased pain sensation since the episode occurred. States he is independent at home. His son is his PCA and lives close by. In ED, HR , BP /50-74, RR 18, SaO2 100% on RA, Temp 98.3  F . No labs done in ED (Patient does not want to be stuck). Left knee xray reports a comminuted intra-articular fracture of the diaphysis and distal metaphysis and distal epiphysis of the femur, 1 or 2 mm of displacement; lipohemarthrosis distending the knee joint; fracture extends at least 22 cm proximal to the joint line, but may extend further cephalad beyond the field-of-view. Left Femur xray reports minimally displaced comminuted intra-articular fracture of the distal half of the femur, involves the distal 20 cm or so of the femur; small mildly displaced fracture of the superior aspect of the greater trochanter, age indeterminate; deformity of the inferior pubic rami, likely developmental, and there is diffuse bone demineralization. In the ED " the patient was given oxycodone 5mg po x 1 and reports pain is now at 8/10.  Patient was seen in the ED by Orthopedic Surgery who recommended admission for PT and wheelchair with leg rest, pain control and follow up in one week. This morning patient denies any pain. Refusing any labs. Patient is open to discharge to a TCU should PT recommend it. Plan for PT to evaluate today and ensure patient has safe disposition including wheelchair with leg rest.   -Oxycodone 5-10mg q4h prn  -Senna-2 2 tabs BID and Miralax daily while on narcotics  -Per Ortho:              *PT, wheelchair with leg rest              *NWB LLE              *Knee immobilizer at all times, leg in full extension X 1 week              *PO pain control, rest, ice, elevation              *Follow-up in 1 week with repeat radiographs     ##UTI  UA this morning shows positive nitrite, small blood, large LE, 100 WBC, 5 RBC, many bacteria, WBC clumps present. Patient refusing IV medications. Will start Omnicef. Cultures pending. Patient refusing any labs. Afebrile  -Omnicef BID  -Follow cultures  -VS q4h    ##. Neurogenic Bladder: -Straight cath q4-6h       Diet: Regular Diet Adult    DVT Prophylaxis: Pneumatic Compression Devices  Carrion Catheter: Not present  Central Lines: None  Cardiac Monitoring: None  Code Status: Full Code      Disposition Plan     Discharge pending PT evaluation    The patient's care was discussed with the Attending Physician, Dr. Palumbo, Bedside Nurse, Care Coordinator/, Patient and PT Team.    CELY Jean CNP  ED Observation   [facility]  Securely message with the Vocera Web Console (learn more here)  Text page via [facility] Paging/Directory         Clinically Significant Risk Factors Present on Admission                    # Overweight: Estimated body mass index is 25.4 kg/m  as calculated from the following:    Height as of this encounter: 1.753 m (5'  "9\").    Weight as of this encounter: 78 kg (172 lb).        ______________________________________________________________________    Interval History   No events overnight. Patient refusing any labs. Plan for patient to be evaluated by PT for disposition recommendations.     Data reviewed today: I reviewed all medications, new labs and imaging results over the last 24 hours.    Physical Exam   Vital Signs: Temp: 98.6  F (37  C) Temp src: Oral BP: 102/52 Pulse: 100   Resp: 18 SpO2: 100 % O2 Device: None (Room air)    Weight: 172 lbs 0 oz    Constitutional: awake, alert, cooperative, no apparent distress, and appears stated age  Eyes: Lids and lashes normal, pupils equal, round and reactive to light, extra ocular muscles intact, sclera clear, conjunctiva normal  ENT: Normocephalic, without obvious abnormality, atraumatic, sinuses nontender on palpation, external ears without lesions, oral pharynx with moist mucous membranes, tonsils without erythema or exudates, gums normal and good dentition.  Hematologic / Lymphatic: no cervical lymphadenopathy  Respiratory: No increased work of breathing, good air exchange, clear to auscultation bilaterally, no crackles or wheezing  Cardiovascular: Normal apical impulse, regular rate and rhythm, normal S1 and S2, no S3 or S4, and no murmur noted  GI: No scars, normal bowel sounds, soft, non-distended, non-tender, no masses palpated, no hepatosplenomegally  Skin: no bruising or bleeding  Musculoskeletal:Left knee with effusion, TTP. TTP femur. No active ROM on LE. Baseline parapelgia. Patient reports baseline sensation in his BLE. Pedal pulses bounding bilaterally.  . No other tenderness.   Neurologic: Awake, alert, oriented to name, place and time. Baseline parapelgia. Patient reports baseline sensation in his BLE. Pedal pulses bounding bilaterally.  Neuropsychiatric: General: normal, calm and normal eye contact     Data   No lab results found in last 7 days.    Recent Results " (from the past 24 hour(s))   XR Knee Left 3 Views    Narrative    EXAM: XR KNEE LEFT 3 VIEWS  LOCATION: [facility]  DATE/TIME: 9/23/2022 6:09 PM    INDICATION: Injury, pain, swelling.  COMPARISON: None.      Impression    IMPRESSION: There is a comminuted intra-articular fracture of the diaphysis and distal metaphysis and distal epiphysis of the femur. There is only 1 or 2 mm of displacement. There is a lipohemarthrosis distending the knee joint. The fracture extends at   least 22 cm proximal to the joint line, but may extend further cephalad beyond the field-of-view. Otherwise negative.   XR Femur Left 2 Views    Narrative    EXAM: XR FEMUR LEFT 2 VIEWS  LOCATION: [facility]  DATE/TIME: 9/23/2022 6:09 PM    INDICATION: Pain, injury.  COMPARISON: None.      Impression    IMPRESSION:   1. Minimally displaced comminuted intra-articular fracture of the distal half of the femur. This involves the distal 20 cm or so of the femur. There is a lipohemarthrosis distending the knee joint.  2. Small mildly displaced fracture of the superior aspect of the greater trochanter, age indeterminate.  3. There is also deformity of the inferior pubic rami, likely developmental, and there is diffuse bone demineralization.      Medications       acetaminophen  1,000 mg Oral TID     gabapentin  400 mg Oral At Bedtime     polyethylene glycol  17 g Oral Daily     senna-docusate  2 tablet Oral BID

## 2022-09-24 NOTE — PROGRESS NOTES
Care Management Discharge Note    Discharge Date: 09/24/2022     Discharge Disposition:   Patient declines all dispositions except returning home to his apartment in [place] (no stairs, has an elevator, has keys)    Discharge Services:    Patient is declining to work with OBS until PT staff  Patient declines Home Care PT    Discharge DME:    We recommend you follow up with [facility] to obtain the wheelchair leg rest. A DME order for this piece of equipment has been placed. You should call them on Monday when they open to obtain the leg extension equipment that attaches to your custom wheelchair.    [facility]  [address] [place] [address] [address] [address]  Hours: Mon-Fri 8-430pm  Phone: [phone]  Fax: [phone]    Signed DC orders faxed by writer to [facility] for processing on Monday    Discharge Transportation: patient is agreeable to [facility] transport, has his own custom w/c here,  at 2pm.    Private pay costs discussed: Not applicable, [employer] and Medicare    Education Provided on the Discharge Plan:  yes  Persons Notified of Discharge Plans: patient  Patient/Family in Agreement with the Plan:  yes    Handoff Referral Completed: Yes  PCP:   Vivian Silva MD       Primary Location:   [facility]         Liseth Pablo RN, BSN, PHN  Nurse Care Coordinator  [facility]  ED/OBS Unit  Phone: [phone]  Fax: [phone]  ED/OBS On-call pager, [phone], 4:00pm to midnight

## 2022-09-24 NOTE — DISCHARGE SUMMARY
[facility]  ED Observation Discharge Summary      Date of Admission:  9/23/2022  Date of Discharge:  9/24/2022  Discharging Provider: CELY Jean CNP  Discharge Service: ED Observation  Discharge Diagnoses     Incomplete, Minimally Displaced Spiral Distal Femur Fracture    Follow-ups Needed After Discharge   Follow-up Appointments     Follow Up and recommended labs and tests      Follow up with primary care provider, Bailey Thompson, within 7   days for hospital follow- up.  The following labs/tests are recommended:   CBC, CMP, UA.    We recommend you follow up with orthopedics in 1 week. A referral has been   placed and they will call you to schedule.    We recommend you follow up with [facility] to obtain the wheelchair leg   rest. A DME order for this piece of equipment has been placed. You should   call them on Monday when they open to obtain the leg extension equipment   that attaches to your chair.             Unresulted Labs Ordered in the Past 30 Days of this Admission     Date and Time Order Name Status Description    9/24/2022  9:21 AM Urine Culture In process       These results will be followed up by ED Observation     Discharge Disposition   Discharged to home  Condition at discharge: Stable      Hospital Course     Arnold Duke is a 57 year old male admitted on 9/23/2022. 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       ##. Incomplete, Minimally Displaced Spiral Distal Femur Fracture: Reports today he was trying to transfer himself to his shower bench and  "in the process WC probably moved a bit, twisted his left leg in an odd fashion, felt an abrupt onset \"pop\" and since has had some pain in the left knee. No direct trauma or any other injury. Reporting increased pain sensation since the episode occurred. States he is independent at home. His son is his PCA and lives close by. In ED, HR , BP /50-74, RR 18, SaO2 100% on RA, Temp 98.3  F . No labs done in ED (Patient does not want to be stuck). Left knee xray reports a comminuted intra-articular fracture of the diaphysis and distal metaphysis and distal epiphysis of the femur, 1 or 2 mm of displacement; lipohemarthrosis distending the knee joint; fracture extends at least 22 cm proximal to the joint line, but may extend further cephalad beyond the field-of-view. Left Femur xray reports minimally displaced comminuted intra-articular fracture of the distal half of the femur, involves the distal 20 cm or so of the femur; small mildly displaced fracture of the superior aspect of the greater trochanter, age indeterminate; deformity of the inferior pubic rami, likely developmental, and there is diffuse bone demineralization. In the ED the patient was given oxycodone 5mg po x 1 and reports pain is now at 8/10.  Patient was seen in the ED by Orthopedic Surgery who recommended admission for PT and wheelchair with leg rest, pain control and follow up in one week. Patient was evaluated by Ortho who recommended: PT, wheelchair with leg rest, NWB LLE, knee immobilizer at all times, leg in full extension X1 week, PO pain control, rest, ice, elevation, and follow up in 1 week with repeat radiographs. Patient discharged with Tylenol for pain and oxycodone as needed. Ortho consult placed to schedule 1 week follow up. Patient discharged with knee immobilizer in place. Educated patient that he should wear immobilizer at all times, be non-weight bearing to LLE, and keep leg in full extension. Patient verbalizes understanding. " "Patient refusing homecare PT or outpatient PT referral at discharge. This morning patient reports pain has improved. No redness noted to the left knee. PT attempted to see patient this morning however patient is refusing to work with them. He needs wheelchair leg lift but patient is not willing to consider TCU placement at this time and is requesting to go home. We are unable to obtain a DME wheelchair leg lift today (via [employer] supply company who supplied wheelchair ([employer] as they are both closed until Monday. We discussed this with patient and encouraged patient to stay until Monday until we could obtain wheel chair leg extension piece however patient is unwilling to stay and is requesting to discharge home. Educated patient that he should keep the LLE in the knee immobilizer at all times and must remain completely non weight bearing on the LLE. PT did attempt to see if our [facility] wheelchairs adjustable leg lifts would fit the patients custom wheelchair however they do not. A DME order was placed and patient was encouraged to call [employer] or [employer] [facility] first thing on Monday to obtain the leg extension piece to fit his wheelchair. Patient discharged home with ride set up per . At the time of discharge, patients pain is well controlled. VSS. Transferring independently into wheelchair. Patient verbalized understanding of return to ED instructions, follow up instructions, and importance of following orthopedics recommendations and following up with them    Per care coordinators note, \"Patients manual wheelchair was supplied through [employer]. Leg rest attachment will need to be obtained through this same supplier to ensure proper fit and safety with use of his manual wheelchair. Unable to obtain this attachment on the weekend, [employer] is only open Monday through Friday. Notified ED/OBS JANES and therapist's.    Patients manual w/c was supplied by " "[employer]  Address: [address] [place] [address] [address], [place] [address]  Hours: Mon-Fri 8-430pm  Phone: [phone]\"     ##UTI  UA this morning shows positive nitrite, small blood, large LE, 100 WBC, 5 RBC, many bacteria, WBC clumps present. Denies urinary symptoms, afebrile. Self caths at baseline q4-6h/. Patient refusing IV medications and lab draws. Unclear is the represents UTI vs. Colonization. UC pending. Will treat with Cefdinir and follow UC.       ##. Neurogenic Bladder: -Straight cath q4-6h    Consultations This Hospital Stay   CARE MANAGEMENT / SOCIAL WORK IP CONSULT  PHYSICAL THERAPY ADULT IP CONSULT    Code Status   Full Code      CELY Jean [facility] [address] [facility]  [address] [place] [id number]  Phone: [phone]  Fax: [phone]  ______________________________________________________________________    Physical Exam   Vital Signs: Temp: 98.6  F (37  C) Temp src: Oral BP: 102/52 Pulse: 100   Resp: 18 SpO2: 100 % O2 Device: None (Room air)    Weight: 172 lbs 0 oz      Constitutional: awake, alert, cooperative, no apparent distress, and appears stated age  Eyes: Lids and lashes normal, pupils equal, round and reactive to light, extra ocular muscles intact, sclera clear, conjunctiva normal  ENT: Normocephalic, without obvious abnormality, atraumatic, sinuses nontender on palpation, external ears without lesions, oral pharynx with moist mucous membranes, tonsils without erythema or exudates, gums normal and good dentition.  Hematologic / Lymphatic: no cervical lymphadenopathy  Respiratory: No increased work of breathing, good air exchange, clear to auscultation bilaterally, no crackles or wheezing  Cardiovascular: Normal apical impulse, regular rate and rhythm, normal S1 and S2, no S3 or S4, and no murmur noted  GI: No scars, normal bowel sounds, soft, non-distended, non-tender, no masses palpated, no hepatosplenomegally  Skin: no " bruising or bleeding  Musculoskeletal:Left knee with effusion, TTP. TTP femur. No active ROM on LE. Baseline parapelgia. Patient reports baseline sensation in his BLE. Pedal pulses bounding bilaterally.  . No other tenderness.   Neurologic: Awake, alert, oriented to name, place and time. Baseline parapelgia. Patient reports baseline sensation in his BLE. Pedal pulses bounding bilaterally.  Neuropsychiatric: General: normal, calm and normal eye contact        Primary Care Physician   Bailey Thompson    Discharge Orders      MISCELLANEOUS DME SUPPLY OR ACCESSORY, NOT OTHERWISE SPECIFIED    [employer] supplied patient with wheelchair. Patient has new displaced femur fracture and requires a Leg rest wheelchair attachment to keep leg straight until follow up with orthopedics.     Orthopedic  Referral      Reason for your hospital stay    Incomplete, minimally displaced Spiral Distal Femur Fracture     Activity    Non weight bearing on left lower extremity, Wheelchair, knee immobilizer at all times, leg in full extension x 1 week.     Follow Up and recommended labs and tests    Follow up with primary care provider, Bailey Thompson, within 7 days for hospital follow- up.  The following labs/tests are recommended: CBC, CMP, UA.    We recommend you follow up with orthopedics in 1 week. A referral has been placed and they will call you to schedule.    We recommend you follow up with [facility] to obtain the wheelchair leg rest. A DME order for this piece of equipment has been placed. You should call them on Monday when they open to obtain the leg extension equipment that attaches to your chair.     When to contact your care team    Return to the ED with increased swelling/pain/redness or the left knee, fever, uncontrolled nausea, vomiting, unrelieved pain, bleeding not relieved with pressure, dizziness, chest pain, shortness of breath, loss of consciousness, and any new or concerning symptoms.     Discharge  Instructions    You were admitted to ED observation for left knee pain after an injury. In the ED, your vital signs were stable. You did not have any labs done. Left knee x ray showed comminuted intra-articular fracture of the diaphysis and distal metaphysis and distal epiphysis of the femur. Left femur x ray reports minimally displaced comminuted intra-articular fracture of the distal half of the femur. Orthopedics was consulted and discussed the options with you and a decision for nonoperative management was made. Orthopedics recommended: physical therapy, wheelchair with leg rest (Call [facility] on Monday to obtain wheelchair leg extension. An order has been placed. ), and pain control, NWB LLE, Knee immobilizer at all times, leg in full extension X 1 week, rest, ice, elevation and follow-up in 1 week with repeat radiographs. An ortho consult has been placed and they should call you to schedule follow up in 1 week. You should take Tylenol for pain. If you are still having intolerable pain, take oxycodone. We recommend you use this sparingly and only if Tylenol is not relieving your pain. If you are using the oxycodone for pain, you should also take Senna or Miralax as needed to prevent constipation as narcotics can lead to constipation. You should continue to take your other prior to admission medications as previously prescribed. You were also found to have a urinary tract infection. We recommend you take Omnicef twice a day for the next 14 days. A prescription has been sent. PT attempted to work with you however you refused. In summary: non-weight bearing left lower extremity, wheelchair with leg rest, knee immobilizer at all times with leg in full extension. Tylenol first for pain, oxycodone if needed. 14 days of Omnicef for UTI treatment. Follow up with ortho in clinic in 1 week for repeat rediographs (they should call you to schedule. Call [employer] on Monday to obtain wheelchair leg extension. An order has  been placed.     Diet    Follow this diet upon discharge: Regular       Significant Results and Procedures   Most Recent 3 CBC's:No lab results found.  Most Recent 3 BMP's:Recent Labs   Lab Test 03/02/22  1021 07/30/14  0000    140   POTASSIUM 4.2 4.5   CHLORIDE 106 103   CO2 29 35   BUN 12 15   CR 0.82 0.4   ANIONGAP 3 2   SHAI 9.4 9.5   GLC 85 101*   ,   Results for orders placed or performed during the hospital encounter of 09/23/22   XR Knee Left 3 Views    Narrative    EXAM: XR KNEE LEFT 3 VIEWS  LOCATION: [facility]  DATE/TIME: 9/23/2022 6:09 PM    INDICATION: Injury, pain, swelling.  COMPARISON: None.      Impression    IMPRESSION: There is a comminuted intra-articular fracture of the diaphysis and distal metaphysis and distal epiphysis of the femur. There is only 1 or 2 mm of displacement. There is a lipohemarthrosis distending the knee joint. The fracture extends at   least 22 cm proximal to the joint line, but may extend further cephalad beyond the field-of-view. Otherwise negative.   XR Femur Left 2 Views    Narrative    EXAM: XR FEMUR LEFT 2 VIEWS  LOCATION: [facility]  DATE/TIME: 9/23/2022 6:09 PM    INDICATION: Pain, injury.  COMPARISON: None.      Impression    IMPRESSION:   1. Minimally displaced comminuted intra-articular fracture of the distal half of the femur. This involves the distal 20 cm or so of the femur. There is a lipohemarthrosis distending the knee joint.  2. Small mildly displaced fracture of the superior aspect of the greater trochanter, age indeterminate.  3. There is also deformity of the inferior pubic rami, likely developmental, and there is diffuse bone demineralization.        Discharge Medications   Current Discharge Medication List      START taking these medications    Details   acetaminophen (TYLENOL) 500 MG tablet Take 2 tablets (1,000 mg) by mouth 3 times daily    Associated  Diagnoses: Other fracture of left femur, initial encounter for closed fracture (H)      cefdinir (OMNICEF) 300 MG capsule Take 1 capsule (300 mg) by mouth every 12 hours for 14 days  Qty: 28 capsule, Refills: 0    Associated Diagnoses: Urinary tract infection without hematuria, site unspecified      oxyCODONE (ROXICODONE) 5 MG tablet Take 1 tablet (5 mg) by mouth every 6 hours as needed for moderate to severe pain  Qty: 15 tablet, Refills: 0    Associated Diagnoses: Other fracture of left femur, initial encounter for closed fracture (H)      polyethylene glycol (MIRALAX) 17 GM/Dose powder Take 17 g by mouth daily as needed for constipation  Qty: 510 g    Associated Diagnoses: Other fracture of left femur, initial encounter for closed fracture (H)      senna-docusate (SENOKOT-S/PERICOLACE) 8.6-50 MG tablet Take 2 tablets by mouth 2 times daily as needed for constipation    Associated Diagnoses: Other fracture of left femur, initial encounter for closed fracture (H)         CONTINUE these medications which have NOT CHANGED    Details   FLUCONAZOLE PO Take 5 mg by mouth daily      gabapentin (NEURONTIN) 400 MG capsule Take 1 capsule (400 mg) by mouth 2 times daily  Qty: 90 capsule    Associated Diagnoses: Neuropathic pain      Lubricants (LUBRICATING JELLY EX) APPLY JELLY AS DIRECTED FOR SELF CATHING AND BOWEL PROGRAM **USE SEPARATE TUBES TO AVOID CONTAMINATION      VITAMIN D, CHOLECALCIFEROL, PO Take 1,000 Units by mouth daily            Allergies   No Known Allergies

## 2022-09-24 NOTE — PLAN OF CARE
Goal Outcome Evaluation:  -diagnostic tests and consults completed and resulted. No  -vital signs normal or at patient baseline. Yes  -tolerating oral intake to maintain hydration. Yes  -adequate pain control on oral analgesics. Yes  -tolerating oral antibiotics or has plans for home infusion setup. No. Patient receiving IV antibiotics  -returns to baseline functional status. No  -safe disposition plan has been identified. No  -PT consult completed. No  - Ortho consult complete with dispo plan. No

## 2022-09-25 LAB — BACTERIA UR CULT: ABNORMAL

## 2022-09-26 ENCOUNTER — TELEPHONE (OUTPATIENT)
Dept: ORTHOPEDICS | Facility: CLINIC | Age: 57
End: 2022-09-26

## 2022-09-26 ENCOUNTER — PATIENT OUTREACH (OUTPATIENT)
Dept: CARE COORDINATION | Facility: CLINIC | Age: 57
End: 2022-09-26

## 2022-09-26 DIAGNOSIS — S72.8X2A OTHER FRACTURE OF LEFT FEMUR, INITIAL ENCOUNTER FOR CLOSED FRACTURE (H): Primary | ICD-10-CM

## 2022-09-26 NOTE — PROGRESS NOTES
"Clinic Care Coordination Contact  [facility]: Post-Discharge Note  SITUATION                                                      Admission:    Admission Date: 09/23/22   Reason for Admission: Incomplete, minimally displaced Spiral Distal Femur Fracture  Discharge:   Discharge Date: 09/24/22  Discharge Diagnosis: Incomplete, minimally displaced Spiral Distal Femur Fracture    BACKGROUND                                                      Per hospital discharge summary and inpatient provider notes:    Arnold Duke is a 57 year old male admitted on 9/23/2022. 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      ASSESSMENT           Discharge Assessment  How are you doing now that you are home?: \" Everything seems to be okay so far\"  How are your symptoms? (Red Flag symptoms escalate to triage hotline per guidelines): Improved  Do you feel your condition is stable enough to be safe at home until your provider visit?: Yes  Does the patient have their discharge instructions? : Yes  Does the patient have questions regarding their discharge instructions? : No  Were you started on any new medications or were there changes to any of your previous medications? : Yes  Does the patient have all of their medications?: Yes  Do you have questions regarding any of your medications? : No  Do you have all of your needed medical supplies or equipment (DME)?  (i.e. oxygen tank, CPAP, cane, etc.): Yes  Discharge follow-up appointment scheduled within 14 calendar days? : No  Is patient agreeable to assistance with scheduling? : No (\" I'm just about to do " "that \")    Post-op ([facility] Only)  If the patient had a surgery or procedure, do they have any questions for a nurse?: No             PLAN                                                      Outpatient Plan:     Incomplete, minimally displaced Spiral Distal Femur Fracture          Activity     Non weight bearing on left lower extremity, Wheelchair, knee immobilizer at all times, leg in full extension x 1 week.          Follow Up and recommended labs and tests     Follow up with primary care provider, Bailey Thompson, within 7 days for hospital follow- up.  The following labs/tests are recommended: CBC, CMP, UA.     We recommend you follow up with orthopedics in 1 week. A referral has been placed and they will call you to schedule.     We recommend you follow up with [facility] to obtain the wheelchair leg rest. A DME order for this piece of equipment has been placed. You should call them on Monday when they open to obtain the leg extension equipment that attaches to your chair.          When to contact your care team     Return to the ED with increased swelling/pain/redness or the left knee, fever, uncontrolled nausea, vomiting, unrelieved pain, bleeding not relieved with pressure, dizziness, chest pain, shortness of breath, loss of consciousness, and any new or concerning symptoms.          Discharge Instructions     You were admitted to ED observation for left knee pain after an injury. In the ED, your vital signs were stable. You did not have any labs done. Left knee x ray showed comminuted intra-articular fracture of the diaphysis and distal metaphysis and distal epiphysis of the femur. Left femur x ray reports minimally displaced comminuted intra-articular fracture of the distal half of the femur. Orthopedics was consulted and discussed the options with you and a decision for nonoperative management was made. Orthopedics recommended: physical therapy, wheelchair with leg rest (Call [facility] on Monday to " obtain wheelchair leg extension. An order has been placed. ), and pain control, NWB LLE, Knee immobilizer at all times, leg in full extension X 1 week, rest, ice, elevation and follow-up in 1 week with repeat radiographs. An ortho consult has been placed and they should call you to schedule follow up in 1 week. You should take Tylenol for pain. If you are still having intolerable pain, take oxycodone. We recommend you use this sparingly and only if Tylenol is not relieving your pain. If you are using the oxycodone for pain, you should also take Senna or Miralax as needed to prevent constipation as narcotics can lead to constipation. You should continue to take your other prior to admission medications as previously prescribed. You were also found to have a urinary tract infection. We recommend you take Omnicef twice a day for the next 14 days. A prescription has been sent. PT attempted to work with you however you refused. In summary: non-weight bearing left lower extremity, wheelchair with leg rest, knee immobilizer at all times with leg in full extension. Tylenol first for pain, oxycodone if needed. 14 days of Omnicef for UTI treatment. Follow up with ortho in clinic in 1 week for repeat rediographs (they should call you to schedule. Call [facility] on Monday to obtain wheelchair leg extension. An order has been placed.          Diet     Follow this diet upon discharge: Regular          Future Appointments   Date Time Provider Department Center   12/16/2022 11:30 AM Vivian Silva MD [facility]         For any urgent concerns, please contact our 24 hour nurse triage line: [phone] ([phone])         Joellen Lou MA

## 2022-09-26 NOTE — TELEPHONE ENCOUNTER
Per Marlin Pickett. , patient can called [employer]/  Number was provided.  RN called and spoke with patient and patient expressed understanding. Will call that number to schedule his ride.    Marcia Haddad RN

## 2022-09-26 NOTE — TELEPHONE ENCOUNTER
Patient has a referral regarding Other fracture of left femur, to follow up in one week per referral.    Please assist patient with scheduling appt with appropriate provider.    Thank you!

## 2022-09-26 NOTE — TELEPHONE ENCOUNTER
RN called and left patient message.  RN received staff message from [employer], stating this patient needs ride to come to the clinic, stating this patient does not think [employer] does his ride  Message to patient is that clinic here does not provide ride services and it is up to the patient's responsibility to secure rides to the clinic.  RN provided call back number.     Marcia Haddad RN

## 2022-09-28 ENCOUNTER — TELEPHONE (OUTPATIENT)
Dept: ORTHOPEDICS | Facility: CLINIC | Age: 57
End: 2022-09-28

## 2022-09-28 ENCOUNTER — NURSE TRIAGE (OUTPATIENT)
Dept: NURSING | Facility: CLINIC | Age: 57
End: 2022-09-28

## 2022-09-28 ENCOUNTER — TELEPHONE (OUTPATIENT)
Dept: INTERNAL MEDICINE | Facility: CLINIC | Age: 57
End: 2022-09-28

## 2022-09-28 NOTE — TELEPHONE ENCOUNTER
Health Call Center    Phone Message    May a detailed message be left on voicemail: yes     Reason for Call: Other: Patient calling stating he needs an order for an extension for his left leg on his wheel chair due to having a fractured femure. Patient states he called his orthopedics team and they said he needs to request that from his PCC team. Patient states he recieved his wheelchair from [facility]. Per patient he states he does not feel supported without that leg extension. Please call to discuss thank you.      Action Taken: Message routed to:  [facility] pcc    Travel Screening: Not Applicable

## 2022-09-28 NOTE — TELEPHONE ENCOUNTER
RN called and left patient detailed VM. Dr. Henriquez is spine surgeon who happens to be on call during patient's ED visit. We don't manage leg extension and the ask is for patient to reach out to his PCP for follow up.    Marcia Haddad RN        [facility] Call Center    Phone Message    May a detailed message be left on voicemail: yes     Reason for Call: Other: pt would like call back      Action Taken: Message routed to:  [facility] ortho    Travel Screening: Not Applicable     Pt is waiting to see if we found another option for a  Leg extension since [employer] does not have any ??     Please call back to give update on this

## 2022-09-28 NOTE — TELEPHONE ENCOUNTER
[facility] Call Center    Phone Message    May a detailed message be left on voicemail: yes     Reason for Call: Other: Pt is having an issue with his wheelchair and needs an extension he contacted orthotics and proth dept and they told him to ask his care team please call him to advise     Action Taken: Other: ortho [facility]    Travel Screening: Not Applicable

## 2022-09-28 NOTE — TELEPHONE ENCOUNTER
Pt is calling.    Needing a leg extension for his wheelchair.   He stated that the ortho on call couldn't help him and he was advised to contact his PCP.  He is stating that his PCP said that they couldn't help him and he was sent to triage.    Pt very upset, stated that he would call them himself, and asked that I send a message as well, and abruptly hung up the phone, stating we were wasting his time.    Please call patient back after researching where he can get the equipment that he needs.  Triage is unable to help him.  Pt is upset and would like an answer today.    Joellen Delong RN  [facility] Nurse Advisor  9/28/2022 at 1:29 PM

## 2022-09-28 NOTE — TELEPHONE ENCOUNTER
[facility] [place] fax number: [phone]    DME order placed on 9/23/22 was sent over to [facility]. Aimee Montero LPN 9/28/2022 3:25 PM    Spoke to patient to relay that the DME order was sent over to [facility] [place]. Pt states that [facility] does not have the extension part and needs it sent another medial supply company.    Pt was agreeable to DME being sent to [facility]. Referral was faxed to [phone].Aimee Montero LPN 9/28/2022 3:33 PM

## 2022-09-29 ENCOUNTER — PATIENT OUTREACH (OUTPATIENT)
Dept: CARE COORDINATION | Facility: CLINIC | Age: 57
End: 2022-09-29

## 2022-09-29 ENCOUNTER — APPOINTMENT (OUTPATIENT)
Dept: GENERAL RADIOLOGY | Facility: CLINIC | Age: 57
DRG: 480 | End: 2022-09-29
Payer: MEDICARE

## 2022-09-29 ENCOUNTER — HOSPITAL ENCOUNTER (INPATIENT)
Facility: CLINIC | Age: 57
LOS: 4 days | Discharge: HOME | DRG: 480 | End: 2022-10-03
Attending: EMERGENCY MEDICINE | Admitting: INTERNAL MEDICINE
Payer: MEDICARE

## 2022-09-29 ENCOUNTER — TELEPHONE (OUTPATIENT)
Dept: NURSING | Facility: CLINIC | Age: 57
End: 2022-09-29
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
## 2022-09-29 DIAGNOSIS — Z11.52 ENCOUNTER FOR SCREENING LABORATORY TESTING FOR SEVERE ACUTE RESPIRATORY SYNDROME CORONAVIRUS 2 (SARS-COV-2): ICD-10-CM

## 2022-09-29 DIAGNOSIS — S72.402D CLOSED FRACTURE OF DISTAL END OF LEFT FEMUR WITH ROUTINE HEALING, UNSPECIFIED FRACTURE MORPHOLOGY, SUBSEQUENT ENCOUNTER: ICD-10-CM

## 2022-09-29 DIAGNOSIS — S72.402A CLOSED FRACTURE OF DISTAL END OF LEFT FEMUR, UNSPECIFIED FRACTURE MORPHOLOGY, INITIAL ENCOUNTER (H): ICD-10-CM

## 2022-09-29 DIAGNOSIS — G82.20 PARAPLEGIA (H): ICD-10-CM

## 2022-09-29 DIAGNOSIS — L89.324 PRESSURE ULCER OF LEFT BUTTOCK, STAGE 4 (H): ICD-10-CM

## 2022-09-29 DIAGNOSIS — S72.8X2A OTHER FRACTURE OF LEFT FEMUR, INITIAL ENCOUNTER FOR CLOSED FRACTURE (H): Primary | ICD-10-CM

## 2022-09-29 LAB
ALBUMIN SERPL BCG-MCNC: 3.5 G/DL (ref 3.5–5.2)
ALBUMIN UR-MCNC: 20 MG/DL
ALP SERPL-CCNC: 75 U/L (ref 40–129)
ALT SERPL W P-5'-P-CCNC: 15 U/L (ref 10–50)
ANION GAP SERPL CALCULATED.3IONS-SCNC: 10 MMOL/L (ref 7–15)
APPEARANCE UR: CLEAR
APTT PPP: 32 SECONDS (ref 22–38)
AST SERPL W P-5'-P-CCNC: 24 U/L (ref 10–50)
BASOPHILS # BLD AUTO: 0 10E3/UL (ref 0–0.2)
BASOPHILS NFR BLD AUTO: 0 %
BILIRUB SERPL-MCNC: 0.6 MG/DL
BILIRUB UR QL STRIP: NEGATIVE
BUN SERPL-MCNC: 9.3 MG/DL (ref 6–20)
CALCIUM SERPL-MCNC: 9.1 MG/DL (ref 8.6–10)
CHLORIDE SERPL-SCNC: 97 MMOL/L (ref 98–107)
COLOR UR AUTO: YELLOW
CREAT SERPL-MCNC: 0.8 MG/DL (ref 0.67–1.17)
DEPRECATED HCO3 PLAS-SCNC: 26 MMOL/L (ref 22–29)
EOSINOPHIL # BLD AUTO: 0.3 10E3/UL (ref 0–0.7)
EOSINOPHIL NFR BLD AUTO: 3 %
ERYTHROCYTE [DISTWIDTH] IN BLOOD BY AUTOMATED COUNT: 12.9 % (ref 10–15)
GFR SERPL CREATININE-BSD FRML MDRD: >90 ML/MIN/1.73M2
GLUCOSE SERPL-MCNC: 90 MG/DL (ref 70–99)
GLUCOSE UR STRIP-MCNC: NEGATIVE MG/DL
HCT VFR BLD AUTO: 33 % (ref 40–53)
HGB BLD-MCNC: 10.5 G/DL (ref 13.3–17.7)
HGB UR QL STRIP: NEGATIVE
IMM GRANULOCYTES # BLD: 0.1 10E3/UL
IMM GRANULOCYTES NFR BLD: 1 %
INR PPP: 1.12 (ref 0.85–1.15)
KETONES UR STRIP-MCNC: ABNORMAL MG/DL
LEUKOCYTE ESTERASE UR QL STRIP: ABNORMAL
LYMPHOCYTES # BLD AUTO: 2 10E3/UL (ref 0.8–5.3)
LYMPHOCYTES NFR BLD AUTO: 20 %
MCH RBC QN AUTO: 29.3 PG (ref 26.5–33)
MCHC RBC AUTO-ENTMCNC: 31.8 G/DL (ref 31.5–36.5)
MCV RBC AUTO: 92 FL (ref 78–100)
MONOCYTES # BLD AUTO: 0.9 10E3/UL (ref 0–1.3)
MONOCYTES NFR BLD AUTO: 9 %
MUCOUS THREADS #/AREA URNS LPF: PRESENT /LPF
NEUTROPHILS # BLD AUTO: 6.9 10E3/UL (ref 1.6–8.3)
NEUTROPHILS NFR BLD AUTO: 67 %
NITRATE UR QL: NEGATIVE
NRBC # BLD AUTO: 0 10E3/UL
NRBC BLD AUTO-RTO: 0 /100
PH UR STRIP: 6 [PH] (ref 5–7)
PLAT MORPH BLD: NORMAL
PLATELET # BLD AUTO: 251 10E3/UL (ref 150–450)
POTASSIUM SERPL-SCNC: 4 MMOL/L (ref 3.4–5.3)
PROT SERPL-MCNC: 6.7 G/DL (ref 6.4–8.3)
RBC # BLD AUTO: 3.58 10E6/UL (ref 4.4–5.9)
RBC MORPH BLD: NORMAL
RBC URINE: 4 /HPF
SODIUM SERPL-SCNC: 133 MMOL/L (ref 136–145)
SP GR UR STRIP: 1.02 (ref 1–1.03)
SQUAMOUS EPITHELIAL: 1 /HPF
UROBILINOGEN UR STRIP-MCNC: NORMAL MG/DL
WBC # BLD AUTO: 10.3 10E3/UL (ref 4–11)
WBC URINE: 4 /HPF

## 2022-09-29 PROCEDURE — 99285 EMERGENCY DEPT VISIT HI MDM: CPT | Mod: 25 | Performed by: EMERGENCY MEDICINE

## 2022-09-29 PROCEDURE — 73552 X-RAY EXAM OF FEMUR 2/>: CPT | Mod: LT

## 2022-09-29 PROCEDURE — 80053 COMPREHEN METABOLIC PANEL: CPT

## 2022-09-29 PROCEDURE — 85730 THROMBOPLASTIN TIME PARTIAL: CPT | Performed by: EMERGENCY MEDICINE

## 2022-09-29 PROCEDURE — 82040 ASSAY OF SERUM ALBUMIN: CPT

## 2022-09-29 PROCEDURE — 73560 X-RAY EXAM OF KNEE 1 OR 2: CPT | Mod: LT

## 2022-09-29 PROCEDURE — C9803 HOPD COVID-19 SPEC COLLECT: HCPCS | Performed by: EMERGENCY MEDICINE

## 2022-09-29 PROCEDURE — 96361 HYDRATE IV INFUSION ADD-ON: CPT | Performed by: EMERGENCY MEDICINE

## 2022-09-29 PROCEDURE — 84155 ASSAY OF PROTEIN SERUM: CPT | Performed by: PHYSICIAN ASSISTANT

## 2022-09-29 PROCEDURE — 36415 COLL VENOUS BLD VENIPUNCTURE: CPT

## 2022-09-29 PROCEDURE — 120N000002 HC R&B MED SURG/OB UMMC

## 2022-09-29 PROCEDURE — 250N000013 HC RX MED GY IP 250 OP 250 PS 637

## 2022-09-29 PROCEDURE — 86901 BLOOD TYPING SEROLOGIC RH(D): CPT | Performed by: EMERGENCY MEDICINE

## 2022-09-29 PROCEDURE — U0005 INFEC AGEN DETEC AMPLI PROBE: HCPCS

## 2022-09-29 PROCEDURE — 83735 ASSAY OF MAGNESIUM: CPT | Performed by: PHYSICIAN ASSISTANT

## 2022-09-29 PROCEDURE — 250N000013 HC RX MED GY IP 250 OP 250 PS 637: Performed by: EMERGENCY MEDICINE

## 2022-09-29 PROCEDURE — 85610 PROTHROMBIN TIME: CPT | Performed by: EMERGENCY MEDICINE

## 2022-09-29 PROCEDURE — 99285 EMERGENCY DEPT VISIT HI MDM: CPT | Mod: GC | Performed by: EMERGENCY MEDICINE

## 2022-09-29 PROCEDURE — 81001 URINALYSIS AUTO W/SCOPE: CPT

## 2022-09-29 PROCEDURE — 85025 COMPLETE CBC W/AUTO DIFF WBC: CPT

## 2022-09-29 PROCEDURE — 999N000248 HC STATISTIC IV INSERT WITH US BY RN

## 2022-09-29 PROCEDURE — 36415 COLL VENOUS BLD VENIPUNCTURE: CPT | Performed by: EMERGENCY MEDICINE

## 2022-09-29 PROCEDURE — 84450 TRANSFERASE (AST) (SGOT): CPT | Performed by: PHYSICIAN ASSISTANT

## 2022-09-29 PROCEDURE — 73552 X-RAY EXAM OF FEMUR 2/>: CPT | Mod: 26 | Performed by: RADIOLOGY

## 2022-09-29 PROCEDURE — 73560 X-RAY EXAM OF KNEE 1 OR 2: CPT | Mod: 26 | Performed by: RADIOLOGY

## 2022-09-29 PROCEDURE — 96360 HYDRATION IV INFUSION INIT: CPT | Performed by: EMERGENCY MEDICINE

## 2022-09-29 RX ORDER — OXYCODONE HYDROCHLORIDE 10 MG/1
10 TABLET ORAL ONCE
Status: COMPLETED | OUTPATIENT
Start: 2022-09-29 | End: 2022-09-29

## 2022-09-29 RX ORDER — SODIUM CHLORIDE 9 MG/ML
INJECTION, SOLUTION INTRAVENOUS CONTINUOUS
Status: DISCONTINUED | OUTPATIENT
Start: 2022-09-29 | End: 2022-10-02

## 2022-09-29 RX ORDER — SODIUM CHLORIDE 9 MG/ML
INJECTION, SOLUTION INTRAVENOUS ONCE
Status: DISCONTINUED | OUTPATIENT
Start: 2022-09-29 | End: 2022-10-03 | Stop reason: HOSPADM

## 2022-09-29 RX ADMIN — OXYCODONE HYDROCHLORIDE 10 MG: 10 TABLET ORAL at 18:27

## 2022-09-29 RX ADMIN — OXYCODONE HYDROCHLORIDE 10 MG: 10 TABLET ORAL at 23:10

## 2022-09-29 ASSESSMENT — ACTIVITIES OF DAILY LIVING (ADL)
ADLS_ACUITY_SCORE: 39

## 2022-09-29 NOTE — LETTER
Transition Communication Hand-off for Care Transitions to Next Level of Care Provider    Name: Arnold Duke  : 1965  MRN #: [id number]  Primary Care Provider: Bailey Thompson   Primary Clinic: [address] [place] [id number]   Reason for Hospitalization:  Closed fracture of distal end of left femur, unspecified fracture morphology, initial encounter (H) [S72.402A]  Admit Date/Time: 2022  5:45 PM  Discharge Date: 10/2/2022  Payor Source: Payor: MEDICARE / Plan: MEDICARE / Product Type: Medicare /   Discharge Plan: Home with no other needs    Follow-up specialty is recommended: Yes    Follow-up plan:    Future Appointments   Date Time Provider Department Center   2022 11:30 AM Vivian Silva MD [facility]       Any outstanding tests or procedures:            Supplies     Future Labs/Procedures    Wheelchair DME     Process Instructions:    By signing this order, the Authorizing Provider is attesting that they have completed a face-to-face evaluation on the patient to determine their need for this equipment in the last 60 days. A new face-to-face evaluation is required each time  A new prescription for one of the specified items is ordered.   If an additional provider completed the evaluation, please indicate their name below.     **As of 2018, an order requisition and face sheet will print for all DME orders. Please give printed order and face sheet to patient if not obtaining product from [facility] DME closet.     Comments:    DME Documentation: Describe the reason for need to support medical necessity: Impaired gait status post knee surgery. I, the undersigned, certify that the above prescribed supplies are medically necessary for this patient and is both reasonable and necessary in reference to accepted standards of medical practice in the treatment of this patient's condition and is not prescribed as a convenience.          Key Recommendations:       Yanira Keller RN    AVS/Discharge Summary is the source of truth; this is a helpful guide for improved communication of patient story

## 2022-09-29 NOTE — ED PROVIDER NOTES
ED Provider Note  [facility]      History     Chief Complaint   Patient presents with     Leg Pain     HPI  Arnold Duke is a 57 year old male with Hx of disseminated coccidiomycosis, hydrocephalus s/p  shunt, and cauda equina syndrome resulting in paraplegia, neurogenic bladder (self caths), pelvic osteo, and stage 4 sacral wounds who was recently discharged on 9/24 from ED OBs for a left minimally displaced spiral distal femur fracture after self transferring in the bathroom. The pt was seen by Ortho in the ED, who recommended admission for PT and a wheelchair with leg rest as well as pain control. He was placed in a knee immobilizer with plan to keep the leg straight for one week. During his stay, he declined to work with PT and declined an outpatient PT referral. CM was working on getting him a wheel chair leg extension compatible with his wheel chair, but they were unable to do this over the weekend. He was advised to stay while awaiting for the wheel chair extension, but he requested to be discharged home. He was offered TCU placement, but he declined due to not having the correct commode needed for his sacral skin grafts. Today he returns and notes to have continued, constant pain in his left leg and states he is unable to maintain self care. He has continued to take oxycodone and tylenol as prescribed.  He has brought his commode and is willing to go to rehabilitation      Past Medical History  History reviewed. No pertinent past medical history.  Past Surgical History:   Procedure Laterality Date     BIOPSY       GASTROSTOMY, INSERT TUBE, COMBINED       IMPLANT SHUNT VENTRICULOPERITONEAL      due to hydrocephalus from meningitis     [facility]  11/21/2014          acetaminophen (TYLENOL) 500 MG tablet  cefdinir (OMNICEF) 300 MG capsule  FLUCONAZOLE PO  gabapentin (NEURONTIN) 400 MG capsule  Lubricants (LUBRICATING JELLY EX)  oxyCODONE (ROXICODONE) 5 MG tablet  polyethylene glycol  "(MIRALAX) 17 GM/Dose powder  senna-docusate (SENOKOT-S/PERICOLACE) 8.6-50 MG tablet  VITAMIN D, CHOLECALCIFEROL, PO      No Known Allergies  Family History  No family history on file.  Social History   Social History     Tobacco Use     Smoking status: Former Smoker     Smokeless tobacco: Never Used      Past medical history, past surgical history, medications, allergies, family history, and social history were reviewed with the patient. No additional pertinent items.       Review of Systems  A complete review of systems was performed with pertinent positives and negatives noted in the HPI, and all other systems negative.    Physical Exam   BP: 111/65  Pulse: 97  Resp: 16  Height: 175.3 cm (5' 9\")  Weight: 79.4 kg (175 lb)  SpO2: 100 %  Physical Exam  HENT:      Head: Normocephalic and atraumatic.   Eyes:      Extraocular Movements: Extraocular movements intact.      Pupils: Pupils are equal, round, and reactive to light.   Cardiovascular:      Rate and Rhythm: Normal rate and regular rhythm.      Pulses: Normal pulses.      Heart sounds: Normal heart sounds.   Pulmonary:      Effort: Pulmonary effort is normal.      Breath sounds: Normal breath sounds.   Abdominal:      Palpations: Abdomen is soft.      Tenderness: There is no abdominal tenderness.   Musculoskeletal:         General: Signs of injury present.      Comments: Left knee in immobilized brace   Skin:     General: Skin is warm and dry.   Neurological:      Mental Status: He is alert and oriented to person, place, and time.      Sensory: Sensory deficit present.      Comments: Paraplegic below waist  Decreased sensation L>R distal LE       ED Course      Procedures       The medical record was reviewed and interpreted.  Current labs reviewed and interpreted.  Previous labs reviewed and interpreted.              Results for orders placed or performed during the hospital encounter of 09/29/22   CBC with platelets differential     Status: None ()    Narrative "    The following orders were created for panel order CBC with platelets differential.  Procedure                               Abnormality         Status                     ---------                               -----------         ------                     CBC with platelets and d...[[id number]]                                                   Please view results for these tests on the individual orders.     Medications   oxyCODONE IR (ROXICODONE) tablet 10 mg (10 mg Oral Given 9/29/22 1827)        Assessments & Plan (with Medical Decision Making)   Arnold Duke is a 57 year old male with Hx of disseminated coccidiomycosis, hydrocephalus s/p  shunt, and cauda equina syndrome resulting in paraplegia, neurogenic bladder (self caths), pelvic osteo, and stage 4 sacral wounds who was recently discharged on 9/24 from ED OBs for minimally displaced left spiral femur fracture. At that time, Orthopedics recommended conservative non-operative management with a knee immobilizer, physical therapy, and wheelchair with leg rest. He declined TCU placement, home PT referral, and requested discharge prior to having his wheel chair leg extension delivered. Now he notes continued pain and imaging has found that the displacement has increased significantly in the left femur. Discussed with orthopedics, who is recommending admission and OR in AM for repair. Plan to admit to Orthopedics.       I have reviewed the nursing notes. I have reviewed the findings, diagnosis, plan and need for follow up with the patient.    New Prescriptions    No medications on file       Final diagnoses:   None   Moises Cummings MD   PGY-1    --  Evelio Rowley  [facility] EMERGENCY DEPARTMENT  9/29/2022      --    ED Attending Physician Attestation    I Evelio Rowley DO, cared for this patient with the Resident. I have performed a history and physical examination of the patient and discussed management with the resident. I  reviewed the resident's documentation above and agree with the documented findings and plan of care.    Summary of HPI, PE, ED Course   Patient is a 57 year old male evaluated in the emergency department for leg pain. Exam notable for leg tenderness . ED course notable for evidence of worsening fracture displacement . After the completion of care in the emergency department, the patient was admitted to inpatient.    Critical Care & Procedures  Not applicable.    Medical Decision Making  The medical record was reviewed and interpreted.  Current labs reviewed and interpreted.  Previous labs reviewed and interpreted.  Current images reviewed and interpreted:  .      Evelio Rowley DO  Emergency Medicine          Evelio Rowley DO  10/01/22 0021

## 2022-09-29 NOTE — PROGRESS NOTES
Social Work Intervention  [facility] [place]    Data/Intervention:    Patient Name:  Arnold Duke  /Age:  1965 (57 year old)    Visit Type: telephone  Referral Source: Ortho clinic  Reason for Referral:  Transportation to ED    Collaborated With:    -CHUCK Edwards  -Arnold    Psychosocial Information/Concerns:  I received a message from CHUCK Edwards indicating that Arnold is having increased leg pain and thinks his leg is completely broken instead of just fractured. Per Grace she advised that Arnold go to the ED but Arnold is needing assistance coordinating transportation to the ED and refused to call EMS due to concerns about cost.     Intervention/Education/Resources Provided:  I spoke with Arnold and he confirmed having concerns about cost with calling EMS. I explained that [place], which is typically the only insurance that will pay for medical insurance so Arnold should not be charged for this. Arnold reported he will call his insurance ([employer]) first to verify coverage if this is the only way to get to the ED. I explained that [facility] w/c transport (which is how Arnold recently got home from the hospital) is not an option for going to the ED. I also asked if Arnold's son who is his PCA could bring him to the ED and Arnold said no, and if he could his son is not trained in how to help him in a car with a broken leg. After hearing this I advised that EMS would be the safest way for Arnold to get to the ED to prevent further injury. Arnold said he will call his insurance.     Assessment/Plan:  I will update CHUCK Edwards as to conversation with Arnold.     I later contacted Arnold again at 3pm to check on him and he reported he will be calling 911 in about a half hour as he had some things he needed to take care of first.     Provided patient/family with contact information and availability.    MARIAM Segrua, [facility]    [facility]  Ph: [phone], Pgr:  [phone]  9/29/2022

## 2022-09-29 NOTE — TELEPHONE ENCOUNTER
Pt calling with increased leg pain.    Says he is having extreme pain 10/10 even with taking prescribed pain medication  Wants to see if can get an xray sooner than 10/4  Thinks his leg might be completely broken instead of just fractured    Also wondering about getting into a rehab facility as soon as possible, as he is in so much pain and does not have any assistance at home.    After review of his discharge AVS from the ED on 9/24, instructions advise to return to ED with any increase in pain not controlled with pain medication. Instructed pt that he should return to the ED. Pt states that he cannot drive and will need help getting back to the hospital. Suggested calling EMS for assistance but he adamantly refused due to cost. Says he was able to get a ride home from the ED from someone and would like to see if there is a way they the could come pick him up. This write is unsure of what service he is referring to, but will attempt to contact the support services who helped him with transportation before.    Grace Rodriguez, RN, BSN  [facility]   Triage Nurse Advisor

## 2022-09-30 ENCOUNTER — DOCUMENTATION ONLY (OUTPATIENT)
Dept: INTERNAL MEDICINE | Facility: CLINIC | Age: 57
End: 2022-09-30

## 2022-09-30 ENCOUNTER — ANESTHESIA EVENT (OUTPATIENT)
Dept: SURGERY | Facility: CLINIC | Age: 57
DRG: 480 | End: 2022-09-30
Payer: MEDICARE

## 2022-09-30 ENCOUNTER — ANESTHESIA (OUTPATIENT)
Dept: SURGERY | Facility: CLINIC | Age: 57
DRG: 480 | End: 2022-09-30
Payer: MEDICARE

## 2022-09-30 ENCOUNTER — APPOINTMENT (OUTPATIENT)
Dept: GENERAL RADIOLOGY | Facility: CLINIC | Age: 57
DRG: 480 | End: 2022-09-30
Attending: ORTHOPAEDIC SURGERY
Payer: MEDICARE

## 2022-09-30 LAB
ABO/RH(D): NORMAL
ALBUMIN SERPL BCG-MCNC: 3.4 G/DL (ref 3.5–5.2)
ALP SERPL-CCNC: 69 U/L (ref 40–129)
ALT SERPL W P-5'-P-CCNC: 14 U/L (ref 10–50)
ANION GAP SERPL CALCULATED.3IONS-SCNC: 15 MMOL/L (ref 7–15)
ANTIBODY SCREEN: NEGATIVE
AST SERPL W P-5'-P-CCNC: 24 U/L (ref 10–50)
BILIRUB SERPL-MCNC: 0.7 MG/DL
BUN SERPL-MCNC: 9.2 MG/DL (ref 6–20)
CALCIUM SERPL-MCNC: 9.5 MG/DL (ref 8.6–10)
CHLORIDE SERPL-SCNC: 98 MMOL/L (ref 98–107)
CREAT SERPL-MCNC: 0.84 MG/DL (ref 0.67–1.17)
DEPRECATED HCO3 PLAS-SCNC: 23 MMOL/L (ref 22–29)
GFR SERPL CREATININE-BSD FRML MDRD: >90 ML/MIN/1.73M2
GLUCOSE BLDC GLUCOMTR-MCNC: 122 MG/DL (ref 70–99)
GLUCOSE SERPL-MCNC: 78 MG/DL (ref 70–99)
GRAM STAIN RESULT: NORMAL
GRAM STAIN RESULT: NORMAL
HOLD SPECIMEN: NORMAL
MAGNESIUM SERPL-MCNC: 1.8 MG/DL (ref 1.7–2.3)
POTASSIUM SERPL-SCNC: 4.2 MMOL/L (ref 3.4–5.3)
PROT SERPL-MCNC: 6.6 G/DL (ref 6.4–8.3)
SARS-COV-2 RNA RESP QL NAA+PROBE: NEGATIVE
SODIUM SERPL-SCNC: 136 MMOL/L (ref 136–145)
SPECIMEN EXPIRATION DATE: NORMAL

## 2022-09-30 PROCEDURE — 999N000141 HC STATISTIC PRE-PROCEDURE NURSING ASSESSMENT: Performed by: ORTHOPAEDIC SURGERY

## 2022-09-30 PROCEDURE — 250N000025 HC SEVOFLURANE, PER MIN: Performed by: ORTHOPAEDIC SURGERY

## 2022-09-30 PROCEDURE — 250N000013 HC RX MED GY IP 250 OP 250 PS 637: Performed by: PHYSICIAN ASSISTANT

## 2022-09-30 PROCEDURE — 710N000010 HC RECOVERY PHASE 1, LEVEL 2, PER MIN: Performed by: ORTHOPAEDIC SURGERY

## 2022-09-30 PROCEDURE — 250N000009 HC RX 250

## 2022-09-30 PROCEDURE — 258N000003 HC RX IP 258 OP 636: Performed by: NURSE ANESTHETIST, CERTIFIED REGISTERED

## 2022-09-30 PROCEDURE — 87102 FUNGUS ISOLATION CULTURE: CPT | Performed by: STUDENT IN AN ORGANIZED HEALTH CARE EDUCATION/TRAINING PROGRAM

## 2022-09-30 PROCEDURE — 250N000009 HC RX 250: Performed by: NURSE ANESTHETIST, CERTIFIED REGISTERED

## 2022-09-30 PROCEDURE — 87205 SMEAR GRAM STAIN: CPT | Performed by: ORTHOPAEDIC SURGERY

## 2022-09-30 PROCEDURE — 370N000017 HC ANESTHESIA TECHNICAL FEE, PER MIN: Performed by: ORTHOPAEDIC SURGERY

## 2022-09-30 PROCEDURE — 258N000003 HC RX IP 258 OP 636

## 2022-09-30 PROCEDURE — 250N000011 HC RX IP 250 OP 636: Performed by: ORTHOPAEDIC SURGERY

## 2022-09-30 PROCEDURE — 360N000084 HC SURGERY LEVEL 4 W/ FLUORO, PER MIN: Performed by: ORTHOPAEDIC SURGERY

## 2022-09-30 PROCEDURE — 0QSC04Z REPOSITION LEFT LOWER FEMUR WITH INTERNAL FIXATION DEVICE, OPEN APPROACH: ICD-10-PCS | Performed by: ORTHOPAEDIC SURGERY

## 2022-09-30 PROCEDURE — 250N000013 HC RX MED GY IP 250 OP 250 PS 637: Performed by: STUDENT IN AN ORGANIZED HEALTH CARE EDUCATION/TRAINING PROGRAM

## 2022-09-30 PROCEDURE — 999N000180 XR SURGERY CARM FLUORO LESS THAN 5 MIN: Mod: TC

## 2022-09-30 PROCEDURE — 250N000011 HC RX IP 250 OP 636: Performed by: NURSE ANESTHETIST, CERTIFIED REGISTERED

## 2022-09-30 PROCEDURE — 258N000003 HC RX IP 258 OP 636: Performed by: STUDENT IN AN ORGANIZED HEALTH CARE EDUCATION/TRAINING PROGRAM

## 2022-09-30 PROCEDURE — 250N000011 HC RX IP 250 OP 636: Performed by: STUDENT IN AN ORGANIZED HEALTH CARE EDUCATION/TRAINING PROGRAM

## 2022-09-30 PROCEDURE — 120N000002 HC R&B MED SURG/OB UMMC

## 2022-09-30 PROCEDURE — 99221 1ST HOSP IP/OBS SF/LOW 40: CPT | Mod: AI | Performed by: PHYSICIAN ASSISTANT

## 2022-09-30 PROCEDURE — 250N000009 HC RX 250: Performed by: ORTHOPAEDIC SURGERY

## 2022-09-30 PROCEDURE — 258N000003 HC RX IP 258 OP 636: Performed by: EMERGENCY MEDICINE

## 2022-09-30 PROCEDURE — C1713 ANCHOR/SCREW BN/BN,TIS/BN: HCPCS | Performed by: ORTHOPAEDIC SURGERY

## 2022-09-30 PROCEDURE — 272N000001 HC OR GENERAL SUPPLY STERILE: Performed by: ORTHOPAEDIC SURGERY

## 2022-09-30 PROCEDURE — 250N000013 HC RX MED GY IP 250 OP 250 PS 637

## 2022-09-30 PROCEDURE — 272N000002 HC OR SUPPLY OTHER OPNP: Performed by: ORTHOPAEDIC SURGERY

## 2022-09-30 PROCEDURE — 87075 CULTR BACTERIA EXCEPT BLOOD: CPT | Performed by: ORTHOPAEDIC SURGERY

## 2022-09-30 PROCEDURE — 87176 TISSUE HOMOGENIZATION CULTR: CPT | Performed by: ORTHOPAEDIC SURGERY

## 2022-09-30 PROCEDURE — 27514 TREATMENT OF THIGH FRACTURE: CPT | Mod: LT | Performed by: ORTHOPAEDIC SURGERY

## 2022-09-30 PROCEDURE — 250N000011 HC RX IP 250 OP 636

## 2022-09-30 PROCEDURE — 250N000013 HC RX MED GY IP 250 OP 250 PS 637: Performed by: ANESTHESIOLOGY

## 2022-09-30 DEVICE — IMPLANTABLE DEVICE: Type: IMPLANTABLE DEVICE | Site: LEG | Status: FUNCTIONAL

## 2022-09-30 DEVICE — IMP SCR STRK AXSOS 3  5.0X70MM LOCK ST TI 661170: Type: IMPLANTABLE DEVICE | Site: LEG | Status: FUNCTIONAL

## 2022-09-30 RX ORDER — SODIUM CHLORIDE, SODIUM LACTATE, POTASSIUM CHLORIDE, CALCIUM CHLORIDE 600; 310; 30; 20 MG/100ML; MG/100ML; MG/100ML; MG/100ML
INJECTION, SOLUTION INTRAVENOUS CONTINUOUS PRN
Status: DISCONTINUED | OUTPATIENT
Start: 2022-09-30 | End: 2022-09-30

## 2022-09-30 RX ORDER — ENOXAPARIN SODIUM 100 MG/ML
40 INJECTION SUBCUTANEOUS EVERY 24 HOURS
Status: DISCONTINUED | OUTPATIENT
Start: 2022-10-01 | End: 2022-10-03 | Stop reason: HOSPADM

## 2022-09-30 RX ORDER — MAGNESIUM HYDROXIDE 1200 MG/15ML
LIQUID ORAL PRN
Status: DISCONTINUED | OUTPATIENT
Start: 2022-09-30 | End: 2022-09-30 | Stop reason: HOSPADM

## 2022-09-30 RX ORDER — ONDANSETRON 2 MG/ML
INJECTION INTRAMUSCULAR; INTRAVENOUS PRN
Status: DISCONTINUED | OUTPATIENT
Start: 2022-09-30 | End: 2022-09-30

## 2022-09-30 RX ORDER — ASPIRIN 81 MG/1
81 TABLET ORAL 2 TIMES DAILY
Qty: 60 TABLET | Refills: 0 | Status: SHIPPED | OUTPATIENT
Start: 2022-09-30 | End: 2022-10-02

## 2022-09-30 RX ORDER — ONDANSETRON 4 MG/1
4 TABLET, ORALLY DISINTEGRATING ORAL EVERY 6 HOURS PRN
Status: DISCONTINUED | OUTPATIENT
Start: 2022-09-30 | End: 2022-09-30

## 2022-09-30 RX ORDER — NALOXONE HYDROCHLORIDE 0.4 MG/ML
0.4 INJECTION, SOLUTION INTRAMUSCULAR; INTRAVENOUS; SUBCUTANEOUS
Status: DISCONTINUED | OUTPATIENT
Start: 2022-09-30 | End: 2022-10-03 | Stop reason: HOSPADM

## 2022-09-30 RX ORDER — ONDANSETRON 4 MG/1
4 TABLET, ORALLY DISINTEGRATING ORAL EVERY 30 MIN PRN
Status: DISCONTINUED | OUTPATIENT
Start: 2022-09-30 | End: 2022-09-30 | Stop reason: HOSPADM

## 2022-09-30 RX ORDER — SODIUM CHLORIDE, SODIUM LACTATE, POTASSIUM CHLORIDE, CALCIUM CHLORIDE 600; 310; 30; 20 MG/100ML; MG/100ML; MG/100ML; MG/100ML
INJECTION, SOLUTION INTRAVENOUS CONTINUOUS
Status: DISCONTINUED | OUTPATIENT
Start: 2022-09-30 | End: 2022-09-30 | Stop reason: HOSPADM

## 2022-09-30 RX ORDER — ONDANSETRON 4 MG/1
4 TABLET, ORALLY DISINTEGRATING ORAL EVERY 6 HOURS PRN
Status: DISCONTINUED | OUTPATIENT
Start: 2022-09-30 | End: 2022-10-01

## 2022-09-30 RX ORDER — CEFAZOLIN SODIUM/WATER 2 G/20 ML
2 SYRINGE (ML) INTRAVENOUS SEE ADMIN INSTRUCTIONS
Status: DISCONTINUED | OUTPATIENT
Start: 2022-09-30 | End: 2022-09-30 | Stop reason: HOSPADM

## 2022-09-30 RX ORDER — CEFDINIR 300 MG/1
300 CAPSULE ORAL EVERY 12 HOURS
Status: DISCONTINUED | OUTPATIENT
Start: 2022-09-30 | End: 2022-10-03 | Stop reason: HOSPADM

## 2022-09-30 RX ORDER — ACETAMINOPHEN 325 MG/1
975 TABLET ORAL EVERY 8 HOURS
Status: DISCONTINUED | OUTPATIENT
Start: 2022-09-30 | End: 2022-09-30

## 2022-09-30 RX ORDER — HYDROMORPHONE HCL IN WATER/PF 6 MG/30 ML
0.2 PATIENT CONTROLLED ANALGESIA SYRINGE INTRAVENOUS
Status: DISCONTINUED | OUTPATIENT
Start: 2022-09-30 | End: 2022-10-03 | Stop reason: HOSPADM

## 2022-09-30 RX ORDER — AMOXICILLIN 250 MG
2 CAPSULE ORAL 2 TIMES DAILY
Status: DISCONTINUED | OUTPATIENT
Start: 2022-09-30 | End: 2022-10-03 | Stop reason: HOSPADM

## 2022-09-30 RX ORDER — METHOCARBAMOL 750 MG/1
750 TABLET, FILM COATED ORAL EVERY 6 HOURS PRN
Status: DISCONTINUED | OUTPATIENT
Start: 2022-09-30 | End: 2022-10-03 | Stop reason: HOSPADM

## 2022-09-30 RX ORDER — ONDANSETRON 2 MG/ML
4 INJECTION INTRAMUSCULAR; INTRAVENOUS EVERY 30 MIN PRN
Status: DISCONTINUED | OUTPATIENT
Start: 2022-09-30 | End: 2022-09-30 | Stop reason: HOSPADM

## 2022-09-30 RX ORDER — OXYCODONE HYDROCHLORIDE 5 MG/1
5 TABLET ORAL EVERY 4 HOURS PRN
Status: DISCONTINUED | OUTPATIENT
Start: 2022-09-30 | End: 2022-09-30

## 2022-09-30 RX ORDER — AMOXICILLIN 250 MG
1 CAPSULE ORAL 2 TIMES DAILY
Status: DISCONTINUED | OUTPATIENT
Start: 2022-09-30 | End: 2022-10-01

## 2022-09-30 RX ORDER — DEXMEDETOMIDINE HYDROCHLORIDE 4 UG/ML
INJECTION, SOLUTION INTRAVENOUS PRN
Status: DISCONTINUED | OUTPATIENT
Start: 2022-09-30 | End: 2022-09-30

## 2022-09-30 RX ORDER — VANCOMYCIN HYDROCHLORIDE 1 G/20ML
INJECTION, POWDER, LYOPHILIZED, FOR SOLUTION INTRAVENOUS PRN
Status: DISCONTINUED | OUTPATIENT
Start: 2022-09-30 | End: 2022-09-30 | Stop reason: HOSPADM

## 2022-09-30 RX ORDER — CEFAZOLIN SODIUM 1 G/3ML
1 INJECTION, POWDER, FOR SOLUTION INTRAMUSCULAR; INTRAVENOUS EVERY 8 HOURS
Status: COMPLETED | OUTPATIENT
Start: 2022-09-30 | End: 2022-10-01

## 2022-09-30 RX ORDER — NALOXONE HYDROCHLORIDE 0.4 MG/ML
0.2 INJECTION, SOLUTION INTRAMUSCULAR; INTRAVENOUS; SUBCUTANEOUS
Status: DISCONTINUED | OUTPATIENT
Start: 2022-09-30 | End: 2022-10-03 | Stop reason: HOSPADM

## 2022-09-30 RX ORDER — PROCHLORPERAZINE MALEATE 10 MG
10 TABLET ORAL EVERY 6 HOURS PRN
Status: DISCONTINUED | OUTPATIENT
Start: 2022-09-30 | End: 2022-10-03 | Stop reason: HOSPADM

## 2022-09-30 RX ORDER — DIAPER,BRIEF,INFANT-TODD,DISP
EACH MISCELLANEOUS
Status: DISCONTINUED | OUTPATIENT
Start: 2022-09-30 | End: 2022-10-03 | Stop reason: HOSPADM

## 2022-09-30 RX ORDER — SODIUM CHLORIDE, SODIUM LACTATE, POTASSIUM CHLORIDE, CALCIUM CHLORIDE 600; 310; 30; 20 MG/100ML; MG/100ML; MG/100ML; MG/100ML
INJECTION, SOLUTION INTRAVENOUS CONTINUOUS
Status: DISCONTINUED | OUTPATIENT
Start: 2022-09-30 | End: 2022-10-02

## 2022-09-30 RX ORDER — PROPOFOL 10 MG/ML
INJECTION, EMULSION INTRAVENOUS PRN
Status: DISCONTINUED | OUTPATIENT
Start: 2022-09-30 | End: 2022-09-30

## 2022-09-30 RX ORDER — LIDOCAINE 40 MG/G
CREAM TOPICAL
Status: DISCONTINUED | OUTPATIENT
Start: 2022-09-30 | End: 2022-10-03 | Stop reason: HOSPADM

## 2022-09-30 RX ORDER — FENTANYL CITRATE 50 UG/ML
25 INJECTION, SOLUTION INTRAMUSCULAR; INTRAVENOUS EVERY 5 MIN PRN
Status: DISCONTINUED | OUTPATIENT
Start: 2022-09-30 | End: 2022-09-30 | Stop reason: HOSPADM

## 2022-09-30 RX ORDER — OXYCODONE HYDROCHLORIDE 10 MG/1
10 TABLET ORAL EVERY 4 HOURS PRN
Status: DISCONTINUED | OUTPATIENT
Start: 2022-09-30 | End: 2022-10-03 | Stop reason: HOSPADM

## 2022-09-30 RX ORDER — HYDROMORPHONE HYDROCHLORIDE 1 MG/ML
0.2 INJECTION, SOLUTION INTRAMUSCULAR; INTRAVENOUS; SUBCUTANEOUS EVERY 5 MIN PRN
Status: DISCONTINUED | OUTPATIENT
Start: 2022-09-30 | End: 2022-09-30 | Stop reason: HOSPADM

## 2022-09-30 RX ORDER — AMOXICILLIN 250 MG
1 CAPSULE ORAL 2 TIMES DAILY
Status: DISCONTINUED | OUTPATIENT
Start: 2022-09-30 | End: 2022-10-03 | Stop reason: HOSPADM

## 2022-09-30 RX ORDER — AMOXICILLIN 250 MG
1-2 CAPSULE ORAL 2 TIMES DAILY
Qty: 30 TABLET | Refills: 0 | Status: SHIPPED | OUTPATIENT
Start: 2022-09-30 | End: 2022-10-01

## 2022-09-30 RX ORDER — CEFAZOLIN SODIUM/WATER 2 G/20 ML
2 SYRINGE (ML) INTRAVENOUS
Status: COMPLETED | OUTPATIENT
Start: 2022-09-30 | End: 2022-09-30

## 2022-09-30 RX ORDER — ONDANSETRON 2 MG/ML
4 INJECTION INTRAMUSCULAR; INTRAVENOUS EVERY 6 HOURS PRN
Status: DISCONTINUED | OUTPATIENT
Start: 2022-09-30 | End: 2022-10-01

## 2022-09-30 RX ORDER — HYDROMORPHONE HCL IN WATER/PF 6 MG/30 ML
0.4 PATIENT CONTROLLED ANALGESIA SYRINGE INTRAVENOUS
Status: DISCONTINUED | OUTPATIENT
Start: 2022-09-30 | End: 2022-10-03 | Stop reason: HOSPADM

## 2022-09-30 RX ORDER — IBUPROFEN 600 MG/1
600 TABLET, FILM COATED ORAL EVERY 6 HOURS PRN
Qty: 30 TABLET | Refills: 0 | Status: SHIPPED | OUTPATIENT
Start: 2022-09-30 | End: 2022-10-02

## 2022-09-30 RX ORDER — POLYETHYLENE GLYCOL 3350 17 G/17G
17 POWDER, FOR SOLUTION ORAL DAILY
Status: DISCONTINUED | OUTPATIENT
Start: 2022-10-01 | End: 2022-10-03 | Stop reason: HOSPADM

## 2022-09-30 RX ORDER — ACETAMINOPHEN 325 MG/1
975 TABLET ORAL EVERY 8 HOURS
Status: DISCONTINUED | OUTPATIENT
Start: 2022-09-30 | End: 2022-10-03 | Stop reason: HOSPADM

## 2022-09-30 RX ORDER — OXYCODONE HYDROCHLORIDE 5 MG/1
5 TABLET ORAL EVERY 4 HOURS PRN
Status: DISCONTINUED | OUTPATIENT
Start: 2022-09-30 | End: 2022-10-03 | Stop reason: HOSPADM

## 2022-09-30 RX ORDER — KETOROLAC TROMETHAMINE 15 MG/ML
15 INJECTION, SOLUTION INTRAMUSCULAR; INTRAVENOUS EVERY 6 HOURS
Status: DISPENSED | OUTPATIENT
Start: 2022-09-30 | End: 2022-10-01

## 2022-09-30 RX ORDER — FENTANYL CITRATE 50 UG/ML
INJECTION, SOLUTION INTRAMUSCULAR; INTRAVENOUS PRN
Status: DISCONTINUED | OUTPATIENT
Start: 2022-09-30 | End: 2022-09-30

## 2022-09-30 RX ORDER — ONDANSETRON 2 MG/ML
4 INJECTION INTRAMUSCULAR; INTRAVENOUS EVERY 6 HOURS PRN
Status: DISCONTINUED | OUTPATIENT
Start: 2022-09-30 | End: 2022-09-30

## 2022-09-30 RX ORDER — OXYCODONE HYDROCHLORIDE 5 MG/1
5 TABLET ORAL EVERY 4 HOURS PRN
Status: DISCONTINUED | OUTPATIENT
Start: 2022-09-30 | End: 2022-09-30 | Stop reason: HOSPADM

## 2022-09-30 RX ORDER — ONDANSETRON 4 MG/1
4 TABLET, ORALLY DISINTEGRATING ORAL EVERY 6 HOURS PRN
Status: DISCONTINUED | OUTPATIENT
Start: 2022-09-30 | End: 2022-10-03 | Stop reason: HOSPADM

## 2022-09-30 RX ORDER — ACETAMINOPHEN 325 MG/1
650 TABLET ORAL EVERY 4 HOURS PRN
Status: DISCONTINUED | OUTPATIENT
Start: 2022-10-03 | End: 2022-10-03 | Stop reason: HOSPADM

## 2022-09-30 RX ORDER — ONDANSETRON 2 MG/ML
4 INJECTION INTRAMUSCULAR; INTRAVENOUS EVERY 6 HOURS PRN
Status: DISCONTINUED | OUTPATIENT
Start: 2022-09-30 | End: 2022-10-03 | Stop reason: HOSPADM

## 2022-09-30 RX ORDER — SODIUM CHLORIDE, SODIUM LACTATE, POTASSIUM CHLORIDE, CALCIUM CHLORIDE 600; 310; 30; 20 MG/100ML; MG/100ML; MG/100ML; MG/100ML
INJECTION, SOLUTION INTRAVENOUS CONTINUOUS
Status: DISCONTINUED | OUTPATIENT
Start: 2022-09-30 | End: 2022-09-30

## 2022-09-30 RX ORDER — LIDOCAINE HYDROCHLORIDE 20 MG/ML
INJECTION, SOLUTION INFILTRATION; PERINEURAL PRN
Status: DISCONTINUED | OUTPATIENT
Start: 2022-09-30 | End: 2022-09-30

## 2022-09-30 RX ORDER — POLYETHYLENE GLYCOL 3350 17 G/17G
17 POWDER, FOR SOLUTION ORAL DAILY PRN
Status: DISCONTINUED | OUTPATIENT
Start: 2022-09-30 | End: 2022-10-03 | Stop reason: HOSPADM

## 2022-09-30 RX ORDER — LIDOCAINE 40 MG/G
CREAM TOPICAL
Status: DISCONTINUED | OUTPATIENT
Start: 2022-09-30 | End: 2022-09-30

## 2022-09-30 RX ORDER — POLYETHYLENE GLYCOL 3350 17 G/17G
1 POWDER, FOR SOLUTION ORAL DAILY
Qty: 7 PACKET | Refills: 0 | Status: SHIPPED | OUTPATIENT
Start: 2022-09-30 | End: 2022-10-02

## 2022-09-30 RX ORDER — BISACODYL 10 MG
10 SUPPOSITORY, RECTAL RECTAL DAILY PRN
Status: DISCONTINUED | OUTPATIENT
Start: 2022-09-30 | End: 2022-10-03 | Stop reason: HOSPADM

## 2022-09-30 RX ORDER — ACETAMINOPHEN 325 MG/1
650 TABLET ORAL EVERY 4 HOURS PRN
Qty: 100 TABLET | Refills: 0 | Status: SHIPPED | OUTPATIENT
Start: 2022-09-30 | End: 2022-10-02

## 2022-09-30 RX ADMIN — Medication 50 MG: at 13:55

## 2022-09-30 RX ADMIN — TRANEXAMIC ACID 1 G: 100 INJECTION INTRAVENOUS at 14:20

## 2022-09-30 RX ADMIN — Medication 20 MCG: at 14:41

## 2022-09-30 RX ADMIN — OXYCODONE HYDROCHLORIDE 5 MG: 5 TABLET ORAL at 17:44

## 2022-09-30 RX ADMIN — PHENYLEPHRINE HYDROCHLORIDE 100 MCG: 10 INJECTION INTRAVENOUS at 14:38

## 2022-09-30 RX ADMIN — SODIUM CHLORIDE, POTASSIUM CHLORIDE, SODIUM LACTATE AND CALCIUM CHLORIDE: 600; 310; 30; 20 INJECTION, SOLUTION INTRAVENOUS at 02:59

## 2022-09-30 RX ADMIN — CEFDINIR 300 MG: 300 CAPSULE ORAL at 22:57

## 2022-09-30 RX ADMIN — Medication 2 G: at 13:50

## 2022-09-30 RX ADMIN — SENNOSIDES AND DOCUSATE SODIUM 2 TABLET: 50; 8.6 TABLET ORAL at 22:00

## 2022-09-30 RX ADMIN — PHENYLEPHRINE HYDROCHLORIDE 100 MCG: 10 INJECTION INTRAVENOUS at 14:22

## 2022-09-30 RX ADMIN — OXYCODONE HYDROCHLORIDE 5 MG: 5 TABLET ORAL at 12:12

## 2022-09-30 RX ADMIN — SUGAMMADEX 200 MG: 100 INJECTION, SOLUTION INTRAVENOUS at 16:19

## 2022-09-30 RX ADMIN — SODIUM CHLORIDE, POTASSIUM CHLORIDE, SODIUM LACTATE AND CALCIUM CHLORIDE: 600; 310; 30; 20 INJECTION, SOLUTION INTRAVENOUS at 15:37

## 2022-09-30 RX ADMIN — OXYCODONE HYDROCHLORIDE 5 MG: 5 TABLET ORAL at 06:46

## 2022-09-30 RX ADMIN — OXYCODONE HYDROCHLORIDE 10 MG: 10 TABLET ORAL at 22:01

## 2022-09-30 RX ADMIN — MIDAZOLAM 2 MG: 1 INJECTION INTRAMUSCULAR; INTRAVENOUS at 13:50

## 2022-09-30 RX ADMIN — PHENYLEPHRINE HYDROCHLORIDE 100 MCG: 10 INJECTION INTRAVENOUS at 14:32

## 2022-09-30 RX ADMIN — FENTANYL CITRATE 50 MCG: 50 INJECTION, SOLUTION INTRAMUSCULAR; INTRAVENOUS at 13:50

## 2022-09-30 RX ADMIN — PHENYLEPHRINE HYDROCHLORIDE 100 MCG: 10 INJECTION INTRAVENOUS at 14:46

## 2022-09-30 RX ADMIN — OXYCODONE HYDROCHLORIDE 5 MG: 5 TABLET ORAL at 02:57

## 2022-09-30 RX ADMIN — LIDOCAINE HYDROCHLORIDE 100 MG: 20 INJECTION, SOLUTION INFILTRATION; PERINEURAL at 13:55

## 2022-09-30 RX ADMIN — PHENYLEPHRINE HYDROCHLORIDE 100 MCG: 10 INJECTION INTRAVENOUS at 14:27

## 2022-09-30 RX ADMIN — FENTANYL CITRATE 50 MCG: 50 INJECTION, SOLUTION INTRAMUSCULAR; INTRAVENOUS at 14:27

## 2022-09-30 RX ADMIN — CEFAZOLIN 1 G: 1 INJECTION, POWDER, FOR SOLUTION INTRAMUSCULAR; INTRAVENOUS at 22:09

## 2022-09-30 RX ADMIN — SENNOSIDES AND DOCUSATE SODIUM 1 TABLET: 50; 8.6 TABLET ORAL at 08:05

## 2022-09-30 RX ADMIN — SODIUM CHLORIDE, POTASSIUM CHLORIDE, SODIUM LACTATE AND CALCIUM CHLORIDE: 600; 310; 30; 20 INJECTION, SOLUTION INTRAVENOUS at 13:50

## 2022-09-30 RX ADMIN — PHENYLEPHRINE HYDROCHLORIDE 0.3 MCG/KG/MIN: 10 INJECTION INTRAVENOUS at 14:46

## 2022-09-30 RX ADMIN — SODIUM CHLORIDE, POTASSIUM CHLORIDE, SODIUM LACTATE AND CALCIUM CHLORIDE: 600; 310; 30; 20 INJECTION, SOLUTION INTRAVENOUS at 23:25

## 2022-09-30 RX ADMIN — PROPOFOL 150 MG: 10 INJECTION, EMULSION INTRAVENOUS at 13:55

## 2022-09-30 RX ADMIN — ONDANSETRON 4 MG: 2 INJECTION INTRAMUSCULAR; INTRAVENOUS at 16:18

## 2022-09-30 RX ADMIN — Medication 8 MCG: at 16:50

## 2022-09-30 RX ADMIN — ACETAMINOPHEN 975 MG: 325 TABLET, FILM COATED ORAL at 12:12

## 2022-09-30 RX ADMIN — SODIUM CHLORIDE: 9 INJECTION, SOLUTION INTRAVENOUS at 00:23

## 2022-09-30 ASSESSMENT — ACTIVITIES OF DAILY LIVING (ADL)
ADLS_ACUITY_SCORE: 39

## 2022-09-30 NOTE — ANESTHESIA CARE TRANSFER NOTE
Patient: Arnold Duke    Procedure: Procedure(s):  OPEN REDUCTION INTERNAL FIXATION, FRACTURE, FEMUR       Diagnosis: Closed fracture of neck of left femur, initial encounter (H) [S72.002A]  Diagnosis Additional Information: No value filed.    Anesthesia Type:   General     Note:    Oropharynx: oropharynx clear of all foreign objects and spontaneously breathing  Level of Consciousness: awake and drowsy  Oxygen Supplementation: face mask  Level of Supplemental Oxygen (L/min / FiO2): 8  Independent Airway: airway patency satisfactory and stable    Vital Signs Stable: post-procedure vital signs reviewed and stable  Report to RN Given: handoff report given  Patient transferred to: PACU    Handoff Report: Identifed the Patient, Identified the Reponsible Provider, Reviewed the pertinent medical history, Discussed the surgical course, Reviewed Intra-OP anesthesia mangement and issues during anesthesia, Set expectations for post-procedure period and Allowed opportunity for questions and acknowledgement of understanding      Vitals:  Vitals Value Taken Time   /77 09/30/22 1706   Temp     Pulse 94 09/30/22 1710   Resp 10 09/30/22 1710   SpO2 100 % 09/30/22 1710   Vitals shown include unvalidated device data.  Temp. 37.1    Electronically Signed By: CELY Orr CRNA  September 30, 2022  5:10 PM

## 2022-09-30 NOTE — PROGRESS NOTES
Type of Form Received: NUMOTION    Form Received (Date) 9/30/22   Form Filled out Yes 10/13/22   Placed in provider folder Yes

## 2022-09-30 NOTE — BRIEF OP NOTE
[facility]    Brief Operative Note    Pre-operative diagnosis: Closed fracture of left distal femur fracture  Post-operative diagnosis Same as pre-operative diagnosis    Procedure: Procedure(s):  OPEN REDUCTION INTERNAL FIXATION, FRACTURE, FEMUR  Surgeon: Surgeon(s) and Role:     * Mely Singh MD - Primary     * Perry Kimble MD - Resident - Assisting  Anesthesia: General   Estimated Blood Loss: 100 mL from 9/30/2022  1:54 PM to 9/30/2022  5:03 PM      Drains: None  Specimens:   ID Type Source Tests Collected by Time Destination   A : Left femur fracture hematoma    ADD FUNGAL TEST per Dr. Perry Kimble Tissue Femur, Left ANAEROBIC BACTERIAL CULTURE ROUTINE, GRAM STAIN, AEROBIC BACTERIAL CULTURE ROUTINE Mely Singh MD 9/30/2022  2:46 PM      Findings:   fracture hematoma appeared murky .  Complications: None.  Implants:   Implant Name Type Inv. Item Serial No.  Lot No. LRB No. Used Action   PLATE BN 274MM TI 12 HL AXSOS 3 NS FEM LT DIST LAT - [id number] Metallic Hardware/[place] PLATE BN 274MM TI 12 HL AXSOS 3 NS FEM LT DIST LAT  [employer]  Left 1 Implanted   SCREW BN 90MM 6MM TI FT NS AXSOS CANC - [id number] Metallic Hardware/[place] SCREW BN 90MM 6MM TI FT NS AXSOS CANC  [employer]  Left 1 Implanted   IMP SCR STRK AXSOS 3  4.5X38MM CORTEX ST TI [id number] - [id number] Metallic Hardware/[place] IMP SCR STRK AXSOS 3  4.5X38MM CORTEX ST TI [id number]  [employer]  Left 3 Implanted   IMP SCR STRK AXSOS 3  5.0X85MM LOCK ST TI [id number] - [id number] Metallic Hardware/[place] IMP SCR STRK AXSOS 3  5.0X85MM LOCK ST TI [id number]  [employer]  Left 1 Implanted and Explanted   IMP SCR STRK AXSOS 3  5.0X80MM LOCK ST TI [id number] - [id number] Metallic Hardware/[place] IMP SCR STRK AXSOS 3  5.0X80MM LOCK ST TI [id number]  [employer]  Left 1 Implanted   IMP SCR STRK AXSOS 3  5.0X90MM LOCK ST TI [id number] - [id number] Metallic  Hardware/[place] IMP SCR STRK AXSOS 3  5.0X90MM LOCK ST TI [id number]  [employer]  Left 1 Implanted and Explanted   IMP SCR STRK AXSOS 3  5.0X50MM LOCK ST TI [id number] - [id number] Metallic Hardware/[place] IMP SCR STRK AXSOS 3  5.0X50MM LOCK ST TI [id number]  [employer]  Left 1 Implanted   6 x 80 mm cancellous screw fully threaded      Left 1 Implanted and Explanted   IMP SCR STRK AXSOS 3  5.0X40MM LOCK ST TI [id number] - [id number] Metallic Hardware/[place] IMP SCR STRK AXSOS 3  5.0X40MM LOCK ST TI [id number]  [employer]  Left 1 Implanted   IMP SCR STRK AXSOS 3  5.0X75MM LOCK ST TI 989601 - [id number] Metallic Hardware/[place] IMP SCR STRK AXSOS 3  5.0X75MM LOCK ST TI [id number]  [employer]  Left 1 Implanted   IMP SCR STRK AXSOS 3  5.0X70MM LOCK ST TI 247730 - [id number] Metallic Hardware/[place] IMP SCR STRK AXSOS 3  5.0X70MM LOCK ST TI [id number]  [employer]  Left 1 Implanted         Medicine Primary, Orthopaedics consulting  - Activity: Up with assist until independent. Knee ROMAT  - Weightbearing Status: No weight bearing on LLE x 6 weeks   - Pain Management: Transition from IV to PO as tolerated.   - Antibiotics: Ancef 1 g Q8H for 24 hours, 1 gram of vancomycin powder placed at fracture site  - Diet: Begin with clear fluids and progress diet as tolerated.   - DVT Prophylaxis: Mechanical Lovenox 40mg daily while in house. On discharge, patient should take 81mg ASA daily x 6 weeks.   - Imaging: XR of Left Knee and femur in PACU  - Labs: Hgb and BMP in the am.   - Bracing/Splinting: KI for comfort only   - Dressings: Keep tegaderm C/D/I x 7 days  - Drains: none  - Carrion catheter: None    - Physical Therapy/Occupational Therapy: Evaluation and treatment.  - Cultures: Pending; single culture of murky fracture hematoma obtained   - Consults: Hospitalist team primary, PT, OT.  - Follow-up: Clinic in 2 weeks for a wound check and with Dr. Singh in 6 weeks with repeat radiographs.  - Disposition:  Pending progress with therapies, pain control on orals, and medical stability;     Perry Kimble MD  Orthopedic Surgery PGY4  [phone]    Please page me directly with any questions/concerns during regular weekday hours before 5 pm. If there is no response, if it is a weekend, or if it is during evening hours then please page the orthopedic surgery resident on call.

## 2022-09-30 NOTE — CONSULTS
Medicine following    Justin Kendall MD  [facility]  Contact information available via [facility] Paging/Directory

## 2022-09-30 NOTE — CONSULTS
[facility] Orthopedic Surgery Consultation    Arnold Duke MRN# [id number]   Age: 57 year old YOB: 1965   Date of Admission: 9/23/2022    Reason for consult:  Left distal femur pain   Requesting physician: No att. providers found   Level of consult: Consult, follow and place orders          Assessment and Plan:   Assessment:  Arnold Duke is a 57 year old male with PMH significant for paraplegia who orthopedic surgery was consulted on regarding left knee pain.He returns to the ED today with increased displacement of his fracture and increased pain.     After long discussion with the patient, he elected to proceed with open reduction and internal fixation of his left distal femur.    Plan:  - Admit to medicine comanaging  - Plan for OR: Tentative plan for OR with Dr. Singh for ORIF of his left distal femur   -Consent: Obtained, in chart   -Pre-op labs: Ordered.   -Medicine clearance: Pending.  - Anticoagulation/DVT prophylaxis: Hold for OR.  - Antibiotics: Perioperative antibiotics ordered.  - Imaging: Complete for now, may consider CT pending discussion with staff in the morning.  - Activity: Bedrest.  - Weight bearing: Nonweightbearing left lower extremity in knee immobilizer.  Requested longer knee immobilizer from the emergency department..  - Pain control: Ordered.  - Diet: N.p.o. for procedure  - Follow-up: To be determined  - Disposition: To be determined.    On-call staff for this patient is Dr. Gallo    --  Xavier Maddox MD  Orthopedic Surgery PGY-2  [phone]    Please page me directly via [facility] with any questions/concerns during regular weekday hours before 7pm. If there is no response, if it is a weekend, or if it is during evening hours, then please page the orthopedic surgery resident on call.           History of Present Illness:   Arnold Duke is a 57 year old male with PMH significant for paraplegia who orthopedic surgery was consulted on regarding left knee pain. He was seen in the  emergency department on 9/24 and was found to have a minimally displaced fracture of his left distal femur. After long discussion with the patient, he elected to proceed with non-operative management in a KI. He returns to the ED today with increaded displacement and increased pain.  He was found to have increased displacement of his left distal femur fracture.  He is wheelchair dependent does not ambulate but he describes himself as very active.  He participates in pool therapy, he uses his legs to pivot and transfer and he does bear weight on his legs to stand once per day.      A 10 point review of systems was otherwise negative other than as noted in history above.         Past Medical History:   No past medical history on file.    Patient denies any personal history of bleeding disorders, clotting disorders, or adverse reactions to anesthesia.         Past Surgical History:     Past Surgical History:   Procedure Laterality Date     BIOPSY       GASTROSTOMY, INSERT TUBE, COMBINED       IMPLANT SHUNT VENTRICULOPERITONEAL      due to hydrocephalus from meningitis     [facility]  11/21/2014                 Social History:     Social History     Socioeconomic History     Marital status:      Spouse name: Not on file     Number of children: Not on file     Years of education: Not on file     Highest education level: Not on file   Occupational History     Not on file   Tobacco Use     Smoking status: Former Smoker     Smokeless tobacco: Never Used   Substance and Sexual Activity     Alcohol use: Not on file     Drug use: Not on file     Sexual activity: Not on file   Other Topics Concern     Not on file   Social History Narrative    Social History:    4 children, 3 grandkids,     Formally employed as excecutive admin support for [employer]    No pork or red meat    Served in the army, 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     Social Determinants of Health     Financial Resource Strain: Not on file   Food Insecurity: Not on file    Transportation Needs: Not on file   Physical Activity: Not on file   Stress: Not on file   Social Connections: Not on file   Intimate Partner Violence: Not on file   Housing Stability: Not on file     Living Situation: He lives independently  Occupation: Works in IT  Tobacco: Denies  Alcohol: Denies            Family History:   No family history on file.    Patient denies known family history of bleeding, clotting, or anesthesia-related complications.            Allergies:   No Known Allergies          Medications:     Prior to Admission medications    Medication Sig Last Dose Taking? Auth Provider Long Term End Date   acetaminophen (TYLENOL) 500 MG tablet Take 2 tablets (1,000 mg) by mouth 3 times daily  Yes Irma Frias APRN CNP     cefdinir (OMNICEF) 300 MG capsule Take 1 capsule (300 mg) by mouth every 12 hours for 14 days  Yes Irma Frias APRN CNP  10/8/22   oxyCODONE (ROXICODONE) 5 MG tablet Take 1 tablet (5 mg) by mouth every 6 hours as needed for moderate to severe pain  Yes Irma Frias APRN CNP     polyethylene glycol (MIRALAX) 17 GM/Dose powder Take 17 g by mouth daily as needed for constipation  Yes Irma Frias APRN CNP     senna-docusate (SENOKOT-S/PERICOLACE) 8.6-50 MG tablet Take 2 tablets by mouth 2 times daily as needed for constipation  Yes Irma Frias APRN CNP     FLUCONAZOLE PO Take 5 mg by mouth daily   Reported, Patient     gabapentin (NEURONTIN) 400 MG capsule Take 1 capsule (400 mg) by mouth 2 times daily  Patient taking differently: Take 400 mg by mouth daily as needed   Munira Ac MD Yes    Lubricants (LUBRICATING JELLY EX) APPLY JELLY AS DIRECTED FOR SELF CATHING AND BOWEL PROGRAM **USE SEPARATE TUBES TO AVOID CONTAMINATION   Reported, Patient     VITAMIN D, CHOLECALCIFEROL, PO Take 1,000 Units by mouth daily    Reported, Patient       Anticoagulation noted: None           Physical Exam:     Vitals:    09/23/22 1707  "09/23/22 1712 09/24/22 0244 09/24/22 0631   BP: 120/74  91/50 102/52   BP Location:   Right arm Left arm   Pulse: 105  92 100   Resp: 18  18 18   Temp:  98.3  F (36.8  C) 98.6  F (37  C) 98.6  F (37  C)   TempSrc:  Oral Oral Oral   SpO2: 100%  100% 100%   Weight:  78 kg (172 lb)     Height:  1.753 m (5' 9\")         General: alert and oriented, answers questions appropriately  Neuro: EOM grossly intact  HEENT: atraumatic  Lungs: breathing comfortably on RA  Heart/Cardiovascular: well perfused            Left Lower Extremity:   - No gross deformity, skin intact.  Patient is paraplegic and does not have sensation below his hip.  He is not able to move his ankle, knee, hip         Imaging:   All imaging independently reviewed.     X-ray of the left femur shows significant increase in displacement of known left distal femur fracture.         Labs:   CBC:  Lab Results   Component Value Date    WBC 10.3 09/29/2022    HGB 10.5 (L) 09/29/2022     09/29/2022       BMP:  Lab Results   Component Value Date     (L) 09/29/2022    POTASSIUM 4.0 09/29/2022    CHLORIDE 97 (L) 09/29/2022    CO2 26 09/29/2022    BUN 9.3 09/29/2022    CR 0.80 09/29/2022    ANIONGAP 10 09/29/2022    SHAI 9.1 09/29/2022    GLC 90 09/29/2022       Inflammatory Markers:  Lab Results   Component Value Date    WBC 10.3 09/29/2022              "

## 2022-09-30 NOTE — ED NOTES
Called [facility] to give report. Charge RN states they are full and not accepting any more patient. She will call bed placement.

## 2022-09-30 NOTE — H&P
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    Failed conservative mng for left displaced spiral distal femur fracture  Left leg pain  Initial encounter on 9/24 with left knee XR showing minimal displacement of fracture. On reassessment today, XR left knee \"The degree of displacement of the fracture fragments has significantly   increased since the prior 09/23/2022 exam.\"  - NWB LLE, as able, patient declines this for transfers to [facility]  - transfer to [facility] for orthopedic management when bed available  - OR currently planned with Dr. Singh  - DVT ptx: holding for OR and as per ortho recs  - FEN: NPO for OR, LR @100cc/h  - Abx: periop abx per ortho  - pain mng: oxycodone 5mg q4 prn, standing APAP 975mg q8h    Patient concerns, potential AMA discharge  Patient notes concerns with ability to perform ADLs in ED including toileting and transfer to [facility] was impeded this a.m. due to different wheelchair vs pt's home model. 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 pneumoniae UTI  9/24 UA grossly positive and culture K. Pneumoniae is " "ampicillin resistant and otherwise pan-positive. 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 - WOCN consult     Slow transit-associated constipation   - continue PTA senna  - continure prn miralax  - dulcolax suppository prn    Hx tobacco - encourage cessation     Diet: NPO per Anesthesia Guidelines for Procedure/Surgery Except for: Meds    DVT Prophylaxis: contraindicated d/t femur fracture, decubitus ulcers and pre-op  Bravo Catheter: Not present  Central Lines: None  Cardiac Monitoring: None  Code Status: Full Code      Clinically Significant Risk Factors Present on Admission                    # Overweight: Estimated body mass index is 25.84 kg/m  as calculated from the following:    Height as of this encounter: 1.753 m (5' 9\").    Weight as of this encounter: 79.4 kg (175 lb).        Disposition Plan      Expected Discharge Date: 10/02/2022                The patient's care was discussed with the Attending Physician, Dr. Hyatt, Bedside Nurse and Patient.    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    Patient has been boarding in the [facility] ED overnight due to impending transfer for surgery. Unfortunately he is very upset this morning and states he has not been able to move his bowels due to lack of an appropriate type of wheelchair to transfer and also wishes to brush his teeth and wash his face and so far has been unable. He is stating he wants to leave against medical advise for these reasons and that he would leave via [facility] transport and says he has done so in the past.  He does not live with anyone at home    He denies signs of infection and when discussing risks associated with leaving he states \"I don't care\" and sites the above reasons for leaving as being more urgent.     He denies fevers, chills, sweats, signs of infection.  He is still taking oral antibiotics for UTI and denies urinary complaints. He does not endorse any other overt pain or complaints.     Review of Systems    The 10 point Review of Systems is negative other than noted in the HPI or here.     Past Medical History    I have reviewed this patient's medical history and updated it with pertinent information if needed.   History reviewed. No pertinent past medical history.    Past Surgical History   I have reviewed this patient's surgical history and updated it with pertinent information if needed.  Past Surgical History:   Procedure Laterality Date     BIOPSY       GASTROSTOMY, INSERT TUBE, COMBINED       IMPLANT SHUNT VENTRICULOPERITONEAL      due to hydrocephalus from meningitis     [facility]  11/21/2014            Social History   I have reviewed this patient's social history and updated it with pertinent information if needed.  Social History     Tobacco Use     Smoking " status: Former Smoker     Smokeless tobacco: Never Used       Family History     Not contributory    Prior to Admission Medications   Prior to Admission Medications   Prescriptions Last Dose Informant Patient Reported? Taking?   FLUCONAZOLE PO   Yes No   Sig: Take 5 mg by mouth daily   Lubricants (LUBRICATING JELLY EX)   Yes No   Sig: APPLY JELLY AS DIRECTED FOR SELF CATHING AND BOWEL PROGRAM **USE SEPARATE TUBES TO AVOID CONTAMINATION   VITAMIN D, CHOLECALCIFEROL, PO   Yes No   Sig: Take 1,000 Units by mouth daily    acetaminophen (TYLENOL) 500 MG tablet   No No   Sig: Take 2 tablets (1,000 mg) by mouth 3 times daily   cefdinir (OMNICEF) 300 MG capsule   No No   Sig: Take 1 capsule (300 mg) by mouth every 12 hours for 14 days   gabapentin (NEURONTIN) 400 MG capsule   No No   Sig: Take 1 capsule (400 mg) by mouth 2 times daily   Patient taking differently: Take 400 mg by mouth daily as needed   oxyCODONE (ROXICODONE) 5 MG tablet   No No   Sig: Take 1 tablet (5 mg) by mouth every 6 hours as needed for moderate to severe pain   polyethylene glycol (MIRALAX) 17 GM/Dose powder   No No   Sig: Take 17 g by mouth daily as needed for constipation   senna-docusate (SENOKOT-S/PERICOLACE) 8.6-50 MG tablet   No No   Sig: Take 2 tablets by mouth 2 times daily as needed for constipation      Facility-Administered Medications: None     Allergies   No Known Allergies    Physical Exam   Vital Signs:     BP: 119/69 Pulse: 93   Resp: 15 SpO2: 99 % O2 Device: None (Room air)    Weight: 175 lbs 0 oz  GENERAL: Alert and oriented. NAD. Laying on right site.    HEENT: Anicteric sclera. NC. AT. Pupils equal and round  CV: RRR. S1, S2. No murmurs appreciated.   RESPIRATORY: Effort normal on RA. Lungs CTAB with no wheezing, rales, rhonchi.   GI: Abdomen soft, NT, NABS  NEUROLOGICAL: bilateral LE paraplegia. No focal deficits.   EXTREMITIES: No peripheral edema. Intact bilateral pedal pulses.   SKIN: No jaundice. No rashes on exposed  skin.      Data   Data reviewed today: I reviewed all medications, new labs and imaging results over the last 24 hours. I personally reviewed no images or EKG's today.    Recent Labs   Lab 09/30/22  0643 09/29/22  2313 09/29/22  1829   WBC  --   --  10.3   HGB  --   --  10.5*   MCV  --   --  92   PLT  --   --  251   INR  --  1.12  --    NA  --   --  133*   POTASSIUM  --   --  4.0   CHLORIDE  --   --  97*   CO2  --   --  26   BUN  --   --  9.3   CR  --   --  0.80   ANIONGAP  --   --  10   SHAI  --   --  9.1   *  --  90   ALBUMIN  --   --  3.5   PROTTOTAL  --   --  6.7   BILITOTAL  --   --  0.6   ALKPHOS  --   --  75   ALT  --   --  15   AST  --   --  24

## 2022-09-30 NOTE — PROVIDER NOTIFICATION
09/30/22 1705   Peripheral Neurovascular   Peripheral Neurovascular WDL X;capillary refill general;pulse assessment   Capillary Refill, General less than/equal to 3 secs   LLE Neurovascular Assessment   Temperature LLE cool   Color LLE no discoloration   RLE Neurovascular Assessment   Temperature RLE cool   Color RLE no discoloration   Pulse Dorsalis Pedis   Left Dorsalis Pedis Pulse Doppler   Right Dorsalis Pedis Pulse 2+ (normal)   Pulse Posterior Tibial   Left Posterior Tibial Pulse Doppler   Right Posterior Tibial Pulse Doppler   Notified MD Perry Kimble of inability to palpate LE pulses. Dr. Kimble confirmed this was patients pre-operative baseline

## 2022-09-30 NOTE — ED NOTES
Patient called this RN and informed her that he had ordered food from the [employer] and wanted to know how he would get it. This RN informed him where the food packages are placed but someone would have to bring the food to him although at this time he was not allowed to eat or drink as per NPO order status and he was supposed to get IV maintenance fluid. He stated that he wanted to eat and he declined getting an IV.   Dr. Rowley informed about this,spoke to the patient about NPO status, patient in agreement to being NPO but he wants his food delivered and stay by the bedside incase the Surgeon approves of him to eat and drink.     Transferred to room 3 to be able to self catheterize

## 2022-09-30 NOTE — ED NOTES
"     Emergency Department Patient Sign-out       Brief HPI:  This is a 57 year old male signed out to me by Dr. Blanco .  See initial ED Provider note for details of the presentation.            Significant Events prior to my assuming care: The patient is a 57-year-old male with a history of disseminated coccidiomycosis, hydrocephalus status post  shunt, and cauda equina resulting in paraplegia/neurogenic bladder, stage IV sacral wounds who presents to the emergency department with left spiral femur fracture.  Admitted to orthopedic surgery on the [facility].  Plan to take patient to the OR in the morning.      Exam:   Patient Vitals for the past 24 hrs:   BP Pulse Resp SpO2 Height Weight   09/30/22 0645 119/69 93 -- 99 % -- --   09/30/22 0304 108/62 93 -- 100 % -- --   09/29/22 2225 118/65 105 15 100 % -- --   09/29/22 1752 111/65 97 16 100 % 1.753 m (5' 9\") 79.4 kg (175 lb)           ED RESULTS:   Results for orders placed or performed during the hospital encounter of 09/29/22 (from the past 24 hour(s))   CBC with platelets differential     Status: Abnormal    Collection Time: 09/29/22  6:29 PM    Narrative    The following orders were created for panel order CBC with platelets differential.  Procedure                               Abnormality         Status                     ---------                               -----------         ------                     CBC with platelets and d...[[id number]]  Abnormal            Final result               RBC and Platelet Morphology[[id number]]                      Final result                 Please view results for these tests on the individual orders.   Comprehensive metabolic panel     Status: Abnormal    Collection Time: 09/29/22  6:29 PM   Result Value Ref Range    Sodium 133 (L) 136 - 145 mmol/L    Potassium 4.0 3.4 - 5.3 mmol/L    Chloride 97 (L) 98 - 107 mmol/L    Carbon Dioxide (CO2) 26 22 - 29 mmol/L    Anion Gap 10 7 - 15 mmol/L    Urea Nitrogen 9.3 " 6.0 - 20.0 mg/dL    Creatinine 0.80 0.67 - 1.17 mg/dL    Calcium 9.1 8.6 - 10.0 mg/dL    Glucose 90 70 - 99 mg/dL    Alkaline Phosphatase 75 40 - 129 U/L    AST 24 10 - 50 U/L    ALT 15 10 - 50 U/L    Protein Total 6.7 6.4 - 8.3 g/dL    Albumin 3.5 3.5 - 5.2 g/dL    Bilirubin Total 0.6 <=1.2 mg/dL    GFR Estimate >90 >60 mL/min/1.73m2   CBC with platelets and differential     Status: Abnormal    Collection Time: 09/29/22  6:29 PM   Result Value Ref Range    WBC Count 10.3 4.0 - 11.0 10e3/uL    RBC Count 3.58 (L) 4.40 - 5.90 10e6/uL    Hemoglobin 10.5 (L) 13.3 - 17.7 g/dL    Hematocrit 33.0 (L) 40.0 - 53.0 %    MCV 92 78 - 100 fL    MCH 29.3 26.5 - 33.0 pg    MCHC 31.8 31.5 - 36.5 g/dL    RDW 12.9 10.0 - 15.0 %    Platelet Count 251 150 - 450 10e3/uL    % Neutrophils 67 %    % Lymphocytes 20 %    % Monocytes 9 %    % Eosinophils 3 %    % Basophils 0 %    % Immature Granulocytes 1 %    NRBCs per 100 WBC 0 <1 /100    Absolute Neutrophils 6.9 1.6 - 8.3 10e3/uL    Absolute Lymphocytes 2.0 0.8 - 5.3 10e3/uL    Absolute Monocytes 0.9 0.0 - 1.3 10e3/uL    Absolute Eosinophils 0.3 0.0 - 0.7 10e3/uL    Absolute Basophils 0.0 0.0 - 0.2 10e3/uL    Absolute Immature Granulocytes 0.1 <=0.4 10e3/uL    Absolute NRBCs 0.0 10e3/uL   RBC and Platelet Morphology     Status: None    Collection Time: 09/29/22  6:29 PM   Result Value Ref Range    Platelet Assessment  Automated Count Confirmed. Platelet morphology is normal.     Automated Count Confirmed. Platelet morphology is normal.    RBC Morphology Confirmed RBC Indices    ABO/Rh type and screen *Canceled*     Status: None ()    Collection Time: 09/29/22  6:29 PM    Narrative    The following orders were created for panel order ABO/Rh type and screen.  Procedure                               Abnormality         Status                     ---------                               -----------         ------                     Adult Type and Screen[[id number]]                                                          Please view results for these tests on the individual orders.   XR Knee Left 1/2 Views     Status: None    Collection Time: 09/29/22  8:01 PM    Narrative    EXAM: XR FEMUR LEFT 2 VIEWS, XR KNEE LEFT 1/2 VIEWS  LOCATION: [facility]  DATE/TIME: 9/29/2022 8:02 PM    INDICATION: Recent left femur fracture, continued pain.  COMPARISON: 09/23/2022.       Impression    IMPRESSION: Acute comminuted moderately displaced fracture of the distal left femoral metadiaphyseal region with intra-articular extension at the level of the trochlear groove. The degree of displacement of the fracture fragments has significantly   increased since the prior 09/23/2022 exam. Small chronic mildly displaced fracture of the left greater trochanter. Osteopenia.   XR Femur Left 2 Views     Status: None    Collection Time: 09/29/22  8:02 PM    Narrative    EXAM: XR FEMUR LEFT 2 VIEWS, XR KNEE LEFT 1/2 VIEWS  LOCATION: [facility]  DATE/TIME: 9/29/2022 8:02 PM    INDICATION: Recent left femur fracture, continued pain.  COMPARISON: 09/23/2022.       Impression    IMPRESSION: Acute comminuted moderately displaced fracture of the distal left femoral metadiaphyseal region with intra-articular extension at the level of the trochlear groove. The degree of displacement of the fracture fragments has significantly   increased since the prior 09/23/2022 exam. Small chronic mildly displaced fracture of the left greater trochanter. Osteopenia.   UA with Microscopic reflex to Culture     Status: Abnormal    Collection Time: 09/29/22 10:28 PM    Specimen: Urine, Carrion Catheter   Result Value Ref Range    Color Urine Yellow Colorless, Straw, Light Yellow, Yellow    Appearance Urine Clear Clear    Glucose Urine Negative Negative mg/dL    Bilirubin Urine Negative Negative    Ketones Urine Trace (A) Negative mg/dL    Specific Gravity Urine 1.025  1.003 - 1.035    Blood Urine Negative Negative    pH Urine 6.0 5.0 - 7.0    Protein Albumin Urine 20  (A) Negative mg/dL    Urobilinogen Urine Normal Normal, 2.0 mg/dL    Nitrite Urine Negative Negative    Leukocyte Esterase Urine Trace (A) Negative    Mucus Urine Present (A) None Seen /LPF    RBC Urine 4 (H) <=2 /HPF    WBC Urine 4 <=5 /HPF    Squamous Epithelials Urine 1 <=1 /HPF    Narrative    Urine Culture not indicated   ABO/Rh type and screen *Canceled*     Status: None ()    Collection Time: 09/29/22 10:53 PM    Narrative    The following orders were created for panel order ABO/Rh type and screen.  Procedure                               Abnormality         Status                     ---------                               -----------         ------                       Please view results for these tests on the individual orders.   INR     Status: Normal    Collection Time: 09/29/22 11:13 PM   Result Value Ref Range    INR 1.12 0.85 - 1.15   Partial thromboplastin time     Status: Normal    Collection Time: 09/29/22 11:13 PM   Result Value Ref Range    aPTT 32 22 - 38 Seconds   Asymptomatic COVID-19 Virus (Coronavirus) by PCR Nasopharyngeal     Status: Normal    Collection Time: 09/29/22 11:13 PM    Specimen: Nasopharyngeal; Swab   Result Value Ref Range    SARS CoV2 PCR Negative Negative    Narrative    Testing was performed using the Xpert Xpress SARS-CoV-2 Assay on the  Cepheid Gene-Xpert Instrument Systems. Additional information about  this Emergency Use Authorization (EUA) assay can be found via the Lab  Guide. This test should be ordered for the detection of SARS-CoV-2 in  individuals who meet SARS-CoV-2 clinical and/or epidemiological  criteria. Test performance is unknown in asymptomatic patients. This  test is for in vitro diagnostic use under the FDA EUA for  laboratories certified under CLIA to perform high complexity testing.  This test has not been FDA cleared or approved. A negative  result  does not rule out the presence of PCR inhibitors in the specimen or  target RNA in concentration below the limit of detection for the  assay. The possibility of a false negative should be considered if  the patient's recent exposure or clinical presentation suggests  COVID-19. This test was validated by the [facility]. This laboratory is certified under  the Clinical Laboratory Improvement Amendments of 1988 (CLIA-88) as  qualified to perform high complexity laboratory testing.     [place] Draw     Status: None    Collection Time: 09/29/22 11:14 PM    Narrative    The following orders were created for panel order [place] Draw.  Procedure                               Abnormality         Status                     ---------                               -----------         ------                     Extra Red Top Tube[[id number]]                               Final result               Extra Green Top (Lithium...[[id number]]                      Final result               Extra Purple Top Tube[[id number]]                            Final result                 Please view results for these tests on the individual orders.   Extra Red Top Tube     Status: None    Collection Time: 09/29/22 11:14 PM   Result Value Ref Range    Hold Specimen JIC    Extra Green Top (Lithium Heparin) Tube     Status: None    Collection Time: 09/29/22 11:14 PM   Result Value Ref Range    Hold Specimen JIC    Extra Purple Top Tube     Status: None    Collection Time: 09/29/22 11:14 PM   Result Value Ref Range    Hold Specimen JIC    ABO/Rh type and screen     Status: None    Collection Time: 09/29/22 11:14 PM    Narrative    The following orders were created for panel order ABO/Rh type and screen.  Procedure                               Abnormality         Status                     ---------                               -----------         ------                     Adult Type and  Screen[[id number]]                            Final result                 Please view results for these tests on the individual orders.   Adult Type and Screen     Status: None    Collection Time: 09/29/22 11:14 PM   Result Value Ref Range    ABO/RH(D) O POS     Antibody Screen Negative Negative    SPECIMEN EXPIRATION DATE [id number]    Glucose by meter     Status: Abnormal    Collection Time: 09/30/22  6:43 AM   Result Value Ref Range    GLUCOSE BY METER POCT 122 (H) 70 - 99 mg/dL       ED MEDICATIONS:   Medications   sodium chloride 0.9% infusion ( Intravenous Not Given 9/29/22 2130)   sodium chloride 0.9% infusion (0 mLs Intravenous Stopped 9/30/22 0300)   lidocaine 1 % 0.1-1 mL (has no administration in time range)   lidocaine (LMX4) cream (has no administration in time range)   sodium chloride (PF) 0.9% PF flush 3 mL (3 mLs Intracatheter Given 9/30/22 0300)   sodium chloride (PF) 0.9% PF flush 3 mL (has no administration in time range)   melatonin tablet 1 mg (has no administration in time range)   lactated ringers infusion ( Intravenous Rate/Dose Verify 9/30/22 0634)   acetaminophen (TYLENOL) tablet 975 mg (975 mg Oral Not Given 9/30/22 0241)   oxyCODONE (ROXICODONE) tablet 5 mg (5 mg Oral Given 9/30/22 0646)   senna-docusate (SENOKOT-S/PERICOLACE) 8.6-50 MG per tablet 1 tablet (1 tablet Oral Given 9/30/22 0805)     Or   senna-docusate (SENOKOT-S/PERICOLACE) 8.6-50 MG per tablet 2 tablet ( Oral See Alternative 9/30/22 0805)   ondansetron (ZOFRAN ODT) ODT tab 4 mg (has no administration in time range)     Or   ondansetron (ZOFRAN) injection 4 mg (has no administration in time range)   oxyCODONE IR (ROXICODONE) tablet 10 mg (10 mg Oral Given 9/29/22 1827)   oxyCODONE IR (ROXICODONE) tablet 10 mg (10 mg Oral Given 9/29/22 2310)         Impression:    ICD-10-CM    1. Closed fracture of distal end of left femur, unspecified fracture morphology, initial encounter (H)  J50.345C Case Request: OPEN REDUCTION  INTERNAL FIXATION, FRACTURE, FEMUR, DISTAL       Plan:    Initial plan was to admit the patient to orthopedic surgery, however, I received a call from orthopedic surgery stating that hospital policy is reportedly to admit patients requiring transfer to the [facility] for orthopedic surgery to the internal medicine service.    New bed request was placed and this was discussed with patient placement.    Patient to be admitted to [facility] internal medicine service for further management. Plan was discussed with patient who understands and agrees with plan.     MD Nanci Leroy Michelle C, MD  09/30/22 0828

## 2022-09-30 NOTE — ED NOTES
Patient has declined IV placement, states he wants to avoid being stuck. He also states that he has drank a soda bottle of sprite as he had been informed that he could drink fluids. Asking for pain meds only wants oral medication

## 2022-09-30 NOTE — OR NURSING
PACU to Inpatient Nursing Handoff    Patient Arnold Duke is a 57 year old male who speaks English.   Procedure Procedure(s):  OPEN REDUCTION INTERNAL FIXATION, FRACTURE, FEMUR   Surgeon(s) Primary: Mely Singh MD  Resident - Assisting: Perry Kimble MD     No Known Allergies    Isolation  [unfilled]     Past Medical History   has no past medical history on file.    Anesthesia General   Dermatome Level     Preop Meds acetaminophen (Tylenol) - time given: 1212  oxycodone (Oxycontin) - time given: 1212   Nerve block Not applicable   Intraop Meds dexamethasone (Decadron)  dexmedetomidine (Precedex): 20 mcg total  fentanyl (Sublimaze): 100 mcg total  ondansetron (Zofran): last given at 1618  TXA at 1420   Local Meds No   Antibiotics cefazolin (Ancef) - last given at 1350     Pain Patient Currently in Pain: other (see comments) (Patient asleep)   PACU meds  oxycodone (Roxicodone): 5 mg (total dose) last given at 1745    PCA / epidural No   Capnography     Telemetry     Inpatient Telemetry Monitor Ordered? No        Labs Glucose Lab Results   Component Value Date     09/30/2022    GLC 85 03/02/2022     07/30/2014       Hgb Lab Results   Component Value Date    HGB 10.5 09/29/2022       INR Lab Results   Component Value Date    INR 1.12 09/29/2022      PACU Imaging Not applicable     Wound/Incision Incision/Surgical Site 09/30/22 Anterior;Left Thigh (Active)   Incision Assessment WDL 09/30/22 1637   Closure Approximated;Liquid bandage;Sutures 09/30/22 1637   Incision Drainage Amount None 09/30/22 1637   Dressing Intervention Clean, dry, intact;New dressing applied 09/30/22 1637   Number of days: 0       Incision/Surgical Site 09/30/22 Left Leg (Active)   Number of days: 0      CMS        Equipment Not applicable   Other LDA ETT Cuffed Single 7.5 mm (Active)   Number of days: 0        IV Access Peripheral IV 09/29/22 Anterior;Right Lower forearm (Active)   Number of days: 1       Peripheral IV  09/30/22 Left Wrist (Active)   Number of days: 0      Blood Products Not applicable EBL 0 mL   Intake/Output Date 09/30/22 0700 - 10/01/22 0659   Shift 1008-8018 5549-1112 1933-2090 24 Hour Total   INTAKE   I.V.  1000  1000   Shift Total(mL/kg)  1000(12.6)  1000(12.6)   OUTPUT   Blood  100  100   Shift Total(mL/kg)  100(1.26)  100(1.26)   Weight (kg) 79.38 79.38 79.38 79.38      Drains / Carrion     Time of void PreOp      PostOp      Diapered? No   Bladder Scan     PO    tolerating sips     Vitals    B/P: 119/69  T:         P:  Pulse: 93 (09/30/22 0645)          R: 15  O2:  SpO2: 99 %    O2 Device: None (Room air) (09/30/22 0645)              Family/support present none present   Patient belongings     Patient transported on cart   DC meds/scripts (obs/outpt) Not applicable   Inpatient Pain Meds Released? Yes       Special needs/considerations uses home commode that is with patient, and self caths with 14Fr coude tip    Tasks needing completion None       Elizabeth Fagan, CHUCK  ASCOM [id number]

## 2022-09-30 NOTE — ANESTHESIA PREPROCEDURE EVALUATION
Anesthesia Pre-Procedure Evaluation    Patient: Arnold Duke   MRN: [id number] : 1965        Procedure : Procedure(s):  OPEN REDUCTION INTERNAL FIXATION, FRACTURE, FEMUR          History reviewed. No pertinent past medical history.   Past Surgical History:   Procedure Laterality Date     BIOPSY       GASTROSTOMY, INSERT TUBE, COMBINED       IMPLANT SHUNT VENTRICULOPERITONEAL      due to hydrocephalus from meningitis     PICC  2014           No Known Allergies   Social History     Tobacco Use     Smoking status: Former Smoker     Smokeless tobacco: Never Used   Substance Use Topics     Alcohol use: Not on file      Wt Readings from Last 1 Encounters:   22 79.4 kg (175 lb)        Anesthesia Evaluation   Pt has had prior anesthetic. Type: General.        ROS/MED HX  ENT/Pulmonary:  - neg pulmonary ROS     Neurologic:  - neg neurologic ROS   (+) Spinal cord injury, year sustained: , without autonomic hyperflexia symptoms,     Cardiovascular:  - neg cardiovascular ROS     METS/Exercise Tolerance:     Hematologic:  - neg hematologic  ROS     Musculoskeletal:   (+) fracture,     GI/Hepatic:  - neg GI/hepatic ROS     Renal/Genitourinary:       Endo:  - neg endo ROS     Psychiatric/Substance Use:     (+) psychiatric history     Infectious Disease:  - neg infectious disease ROS     Malignancy:  - neg malignancy ROS     Other:            Physical Exam    Airway        Mallampati: II   TM distance: > 3 FB   Neck ROM: full   Mouth opening: > 3 cm    Respiratory Devices and Support         Dental  no notable dental history         Cardiovascular   cardiovascular exam normal          Pulmonary   pulmonary exam normal                OUTSIDE LABS:  CBC:   Lab Results   Component Value Date    WBC 10.3 2022    HGB 10.5 (L) 2022    HCT 33.0 (L) 2022     2022     BMP:   Lab Results   Component Value Date     2022     (L) 2022    POTASSIUM 4.2 2022     POTASSIUM 4.0 09/29/2022    CHLORIDE 98 09/29/2022    CHLORIDE 97 (L) 09/29/2022    CO2 23 09/29/2022    CO2 26 09/29/2022    BUN 9.2 09/29/2022    BUN 9.3 09/29/2022    CR 0.84 09/29/2022    CR 0.80 09/29/2022     (H) 09/30/2022    GLC 78 09/29/2022     COAGS:   Lab Results   Component Value Date    PTT 32 09/29/2022    INR 1.12 09/29/2022     POC: No results found for: BGM, HCG, HCGS  HEPATIC:   Lab Results   Component Value Date    ALBUMIN 3.4 (L) 09/29/2022    PROTTOTAL 6.6 09/29/2022    ALT 14 09/29/2022    AST 24 09/29/2022    ALKPHOS 69 09/29/2022    BILITOTAL 0.7 09/29/2022     OTHER:   Lab Results   Component Value Date    SHAI 9.5 09/29/2022    MAG 1.8 09/29/2022       Anesthesia Plan    ASA Status:  3   NPO Status:  NPO Appropriate    Anesthesia Type: General.     - Airway: ETT   Induction: Intravenous.   Maintenance: Balanced.   Techniques and Equipment:     - Airway: Video-Laryngoscope         Consents    Anesthesia Plan(s) and associated risks, benefits, and realistic alternatives discussed. Questions answered and patient/representative(s) expressed understanding.    - Discussed:     - Discussed with:  Patient      - Extended Intubation/Ventilatory Support Discussed: No.      - Patient is DNR/DNI Status: No    Use of blood products discussed: No .     Postoperative Care    Pain management: Multi-modal analgesia.   PONV prophylaxis: Ondansetron (or other 5HT-3)     Comments:    Other Comments: GETTA with std monitors            Ewa Wisdom MD

## 2022-09-30 NOTE — OP NOTE
DATE OF SURGERY: 9/30/2022    PREOPERATIVE DIAGNOSIS: Closed fracture of left distal femur fracture            POSTOPERATIVE DIAGNOSIS: Same    PROCEDURES:  1. Open Reduction Internal fixation left distal femur fracture     PRIMARY SURGEON: Dr. Mely Singh MD    FIRST ASSISTANT: Perry Kimble MD, PGY-4    SECOND ASSISTANT: None     ANESTHESIA: General Endotracheal    COMPLICATIONS: None apparent immediately postoperatively.    SPECIMENS: 1 fracture hematoma     DRAINS: none    ESTIMATED BLOOD LOSS: 100 mL    INDICATIONS:                          Arnold Duke is a 57 year old male with history of paraplegia who suffered a nondisplaced fracture of the left distal femur on 9/23/22 when he awkwardly twisted his left knee while transferring. He was placed into a knee immobilizer. The patient presented back to the ED due to continued pain and inability to care for himself. Repeat xrays revelaed that the fracture had subsequently displaced. We had a long discussion with the patient about risks and benefits of continued non-operative management vs surgical fixation. After this discussion the patient elected to proceed with surgery       SIGNIFICANT FINDINGS:  Upon entering the fracture site, the hematoma was noted to be murky in appearance. No other signs of infection were present, but a sample was obtained and sent for aerobic, anaerobic, and fungal given his history of disseminated coccidiomycosis.    DESCRIPTION OF PROCEDURE:             Arnold Duke was met in the preoperative holding area where the correct surgical site was identified and marked by the primary surgeon. The patient was thentaken to the operating room, where the Anesthesiology Service induced satisfactory general anesthesia.  Ancef was given IV.  Venous thromboembolic prophylaxis was performed with sequential devices. The patient was placed supineon the operating room table with all bony prominences well padded and a safety strap across the  "patient's waist. The patient was then prepped and draped in the usual sterile fashion. Prior to proceeding with the operation a timeout was held per hospital policy correctly identifying the patient, operative site, and procedure to be performed. All in the room agreed.    A lateral incision was made ~ 10 cm in length from the knee joint proximal in line with the femur. Electrocautery was used to obtain hemostasis and the IT band was incised along the same length as our skin incision. Care was taken to avoid entering the knee joint. The fracture site was opened with blunt finger dissection, which revealed murky fracture hematoma, which prompted a culture to be obtained. We elected to proceed with surgical stabilization.       A 274 mm, 12 hole plate appeared to provide adequate fixation proximal to the fracture. The submuscular elevator was then introduced along the lateral aspect of the femur making sure to stay superficial to the periosteum. We then placed the plate with the guide handle in place. The plate position was confirmed on the AP and lateral fluoroscopic imaging at the level of the knee joint and a K-wire was introduced to hold the plate in place. A 5.0 Cancellous screw was then placed in the distal plate.    We then confirmed that our proximal plate was appropriately oriented with our femoral shaft with fluoroscopic imaging and placed our first percutaneous 4.5 cortical screw.  After securing both proximal and distal aspects of the plate we moved distally and placed Three, 5 mm locking screws and another 6 mm cancellous screws for 6 total screws within the distal fracture fragment. We then moved proximally and placed 4.5 cortical screws in the \"8\" and \"12\" hole (making three, 4.5 cortical screws within the proximal fragment).    Oblique radiographs were then taken of the knee and a few locking screws that were too long were exchanged for shorter screws to make sure they were not too prominent. Final " fluoroscopic imaging confirmed adequate reduction and plate placement. The wounds were then copiously irrigated and 1 gram of vancomycin powder was placed in the distal wound over the plate at the level of the fracture. The IT band was closed with interrupted figure-of-8 stitches. Subcutaneous fat was closed with 2-0 Monocryl and subcuticular was closed with running 3-0 Monocryl and exofin. Gauze covered with Tegaderm was then placed over the wounds and the leg was wrapped with a double 6 inch ace wrap and a Knee immobilizer was placed.     All surgical counts were correct at the end of the case.    Dr. Singh was present for all critical portions of the case.    POSTOPERATIVE PLAN:  - Activity: Up with assist until independent. Knee ROMAT  - Weightbearing Status: No weight bearing on LLE x 6 weeks   - Pain Management: Transition from IV to PO as tolerated.   - Antibiotics: Ancef 1 g Q8H for 24 hours, 1 gram of vancomycin powder placed at fracture site  - Diet: Begin with clear fluids and progress diet as tolerated.   - DVT Prophylaxis: Mechanical Lovenox 40mg daily while in house. On discharge, patient should take 81mg ASA daily x 6 weeks.   - Imaging: XR of Left Knee and femur in PACU  - Labs: Hgb and BMP in the am.   - Bracing/Splinting: KI for comfort only   - Dressings: Keep tegaderm C/D/I x 7 days  - Drains: none  - Carrion catheter: None    - Physical Therapy/Occupational Therapy: Evaluation and treatment.  - Cultures: Pending; single culture of murky fracture hematoma obtained   - Consults: Hospitalist team primary, PT, OT.  - Follow-up: Clinic in 2 weeks for a wound check and with Dr. Singh in 6 weeks with repeat radiographs.  - Disposition: Pending progress with therapies, pain control on orals, and medical stability;     Perry Kimble MD  PGY-4  Orthopaedic Surgery    Physician Attestation   I was present for the entire procedure between opening and closing.    Mely Singh MD  Date of Service  (when I saw the patient): 9/30/22

## 2022-10-01 ENCOUNTER — APPOINTMENT (OUTPATIENT)
Dept: GENERAL RADIOLOGY | Facility: CLINIC | Age: 57
DRG: 480 | End: 2022-10-01
Payer: MEDICARE

## 2022-10-01 LAB
ANION GAP SERPL CALCULATED.3IONS-SCNC: 6 MMOL/L (ref 3–14)
BUN SERPL-MCNC: 10 MG/DL (ref 7–30)
CALCIUM SERPL-MCNC: 9.3 MG/DL (ref 8.5–10.1)
CHLORIDE BLD-SCNC: 104 MMOL/L (ref 94–109)
CO2 SERPL-SCNC: 28 MMOL/L (ref 20–32)
CREAT SERPL-MCNC: 0.69 MG/DL (ref 0.66–1.25)
ERYTHROCYTE [DISTWIDTH] IN BLOOD BY AUTOMATED COUNT: 12.7 % (ref 10–15)
GFR SERPL CREATININE-BSD FRML MDRD: >90 ML/MIN/1.73M2
GLUCOSE BLD-MCNC: 96 MG/DL (ref 70–99)
HCT VFR BLD AUTO: 32.6 % (ref 40–53)
HGB BLD-MCNC: 10.6 G/DL (ref 13.3–17.7)
MCH RBC QN AUTO: 29.5 PG (ref 26.5–33)
MCHC RBC AUTO-ENTMCNC: 32.5 G/DL (ref 31.5–36.5)
MCV RBC AUTO: 91 FL (ref 78–100)
PLATELET # BLD AUTO: 338 10E3/UL (ref 150–450)
POTASSIUM BLD-SCNC: 4 MMOL/L (ref 3.4–5.3)
RBC # BLD AUTO: 3.59 10E6/UL (ref 4.4–5.9)
SODIUM SERPL-SCNC: 138 MMOL/L (ref 133–144)
WBC # BLD AUTO: 10.5 10E3/UL (ref 4–11)

## 2022-10-01 PROCEDURE — 99232 SBSQ HOSP IP/OBS MODERATE 35: CPT | Performed by: INTERNAL MEDICINE

## 2022-10-01 PROCEDURE — 999N000111 HC STATISTIC OT IP EVAL DEFER

## 2022-10-01 PROCEDURE — 250N000013 HC RX MED GY IP 250 OP 250 PS 637: Performed by: STUDENT IN AN ORGANIZED HEALTH CARE EDUCATION/TRAINING PROGRAM

## 2022-10-01 PROCEDURE — 120N000002 HC R&B MED SURG/OB UMMC

## 2022-10-01 PROCEDURE — 85027 COMPLETE CBC AUTOMATED: CPT | Performed by: PHYSICIAN ASSISTANT

## 2022-10-01 PROCEDURE — 999N000065 XR FEMUR LEFT 2 VIEWS

## 2022-10-01 PROCEDURE — 36415 COLL VENOUS BLD VENIPUNCTURE: CPT | Performed by: STUDENT IN AN ORGANIZED HEALTH CARE EDUCATION/TRAINING PROGRAM

## 2022-10-01 PROCEDURE — 80048 BASIC METABOLIC PNL TOTAL CA: CPT | Performed by: STUDENT IN AN ORGANIZED HEALTH CARE EDUCATION/TRAINING PROGRAM

## 2022-10-01 PROCEDURE — 250N000011 HC RX IP 250 OP 636: Performed by: STUDENT IN AN ORGANIZED HEALTH CARE EDUCATION/TRAINING PROGRAM

## 2022-10-01 PROCEDURE — 250N000013 HC RX MED GY IP 250 OP 250 PS 637: Performed by: PHYSICIAN ASSISTANT

## 2022-10-01 RX ORDER — POLYETHYLENE GLYCOL 3350 17 G/17G
1 POWDER, FOR SOLUTION ORAL DAILY PRN
Status: ON HOLD | COMMUNITY
End: 2022-10-02

## 2022-10-01 RX ORDER — AMOXICILLIN 250 MG
1-2 CAPSULE ORAL 2 TIMES DAILY
Qty: 120 TABLET | Refills: 1 | Status: SHIPPED | OUTPATIENT
Start: 2022-10-01 | End: 2022-10-31

## 2022-10-01 RX ORDER — OXYCODONE HYDROCHLORIDE 5 MG/1
5 TABLET ORAL EVERY 4 HOURS PRN
Qty: 30 TABLET | Refills: 0 | Status: SHIPPED | OUTPATIENT
Start: 2022-10-01 | End: 2022-11-16

## 2022-10-01 RX ORDER — ACETAMINOPHEN 325 MG/1
325-650 TABLET ORAL EVERY 4 HOURS PRN
Status: ON HOLD | COMMUNITY
End: 2022-10-02

## 2022-10-01 RX ORDER — AMOXICILLIN 250 MG
2 CAPSULE ORAL 2 TIMES DAILY PRN
COMMUNITY
End: 2022-11-16

## 2022-10-01 RX ORDER — ACETAMINOPHEN 325 MG/1
650 TABLET ORAL EVERY 4 HOURS PRN
Qty: 100 TABLET | Refills: 1 | Status: SHIPPED | OUTPATIENT
Start: 2022-10-03 | End: 2022-10-02

## 2022-10-01 RX ADMIN — CEFDINIR 300 MG: 300 CAPSULE ORAL at 09:21

## 2022-10-01 RX ADMIN — ACETAMINOPHEN 975 MG: 325 TABLET, FILM COATED ORAL at 12:32

## 2022-10-01 RX ADMIN — POLYETHYLENE GLYCOL 3350 17 G: 17 POWDER, FOR SOLUTION ORAL at 09:21

## 2022-10-01 RX ADMIN — OXYCODONE HYDROCHLORIDE 10 MG: 10 TABLET ORAL at 20:59

## 2022-10-01 RX ADMIN — ACETAMINOPHEN 975 MG: 325 TABLET, FILM COATED ORAL at 21:01

## 2022-10-01 RX ADMIN — ENOXAPARIN SODIUM 40 MG: 40 INJECTION SUBCUTANEOUS at 14:07

## 2022-10-01 RX ADMIN — CEFAZOLIN 1 G: 1 INJECTION, POWDER, FOR SOLUTION INTRAMUSCULAR; INTRAVENOUS at 05:52

## 2022-10-01 RX ADMIN — KETOROLAC TROMETHAMINE 15 MG: 15 INJECTION, SOLUTION INTRAMUSCULAR; INTRAVENOUS at 09:21

## 2022-10-01 RX ADMIN — METHOCARBAMOL 750 MG: 750 TABLET ORAL at 16:59

## 2022-10-01 RX ADMIN — KETOROLAC TROMETHAMINE 15 MG: 15 INJECTION, SOLUTION INTRAMUSCULAR; INTRAVENOUS at 16:59

## 2022-10-01 RX ADMIN — OXYCODONE HYDROCHLORIDE 10 MG: 10 TABLET ORAL at 05:47

## 2022-10-01 RX ADMIN — METHOCARBAMOL 750 MG: 750 TABLET ORAL at 09:21

## 2022-10-01 RX ADMIN — CEFDINIR 300 MG: 300 CAPSULE ORAL at 21:00

## 2022-10-01 RX ADMIN — SENNOSIDES AND DOCUSATE SODIUM 2 TABLET: 50; 8.6 TABLET ORAL at 21:00

## 2022-10-01 RX ADMIN — OXYCODONE HYDROCHLORIDE 10 MG: 10 TABLET ORAL at 14:07

## 2022-10-01 RX ADMIN — SENNOSIDES AND DOCUSATE SODIUM 2 TABLET: 50; 8.6 TABLET ORAL at 09:21

## 2022-10-01 ASSESSMENT — ACTIVITIES OF DAILY LIVING (ADL)
ADLS_ACUITY_SCORE: 39

## 2022-10-01 NOTE — PROGRESS NOTES
"Orthopedic Surgery Progress Note: 10/01/2022    Subjective:   No acute events overnight. Pain well-controlled. Tolerating PO. Patient straight caths at baseline. Denies CP/SOB. No new concerns or complaints.    Objective:   /71 (BP Location: Right arm)   Pulse 112   Temp (!) 96.6  F (35.9  C) (Oral)   Resp 17   Ht 1.753 m (5' 9\")   Wt 79.4 kg (175 lb)   SpO2 100%   BMI 25.84 kg/m    No intake/output data recorded.  General: NAD. Resting comfortably in bed.  Respiratory: Breathing comfortably on RA.  Drain Output: none   Musculoskeletal:           LLE  Dressings clean and dry   No motor/sensation about the foot   Foot is WWP      Laboratory Data:  Lab Results   Component Value Date    WBC 10.5 10/01/2022    HGB 10.6 (L) 10/01/2022     10/01/2022    INR 1.12 09/29/2022       Images:  No new images were obtained.    Assessment & Plan:   Arnold Duke is a 57 year old male with PMH including paraplegia and a left distal femur fracture now s/p ORIF on 9/30/22 with Dr. Singh.     Goals for 10/01/22:  Please obtain xrays of left femur today  PT to ensure safe mobilization  Please obtain leg extension piece for wheelchair  If pain remains well controlled, patient can likely discharge later this afternoon.     Medicine Primary, Orthopaedics consulting  - Activity: Up with assist until independent. Knee ROMAT  - Weightbearing Status: No weight bearing on LLE x 6 weeks   - Pain Management: Transition from IV to PO as tolerated.   - Antibiotics: Ancef 1 g Q8H for 24 hours, 1 gram of vancomycin powder placed at fracture site  - Diet: Begin with clear fluids and progress diet as tolerated.   - DVT Prophylaxis: Mechanical Lovenox 40mg daily while in house. On discharge, patient should take 162mg ASA daily x 6 weeks.   - Imaging: XR of Left Knee and femur in PACU  - Labs: Hgb and BMP in the am.   - Bracing/Splinting: KI for comfort only   - Dressings: Keep tegaderm C/D/I x 7 days  - Drains: none  - Carrion " catheter: None    - Physical Therapy/Occupational Therapy: Evaluation and treatment.  - Cultures: Pending; single culture of murky fracture hematoma obtained   - Consults: Hospitalist team primary, PT, OT.  - Follow-up: Clinic in 2 weeks for a wound check and with Dr. Singh in 6 weeks with repeat radiographs.  - Disposition: Pending progress with therapies, pain control on orals, and medical stability;     Orthopedic surgery staff for this patient is Dr. Singh.    ------------------------------------------------------------------------------------------    Perry Kimble MD  Orthopedic Surgery PGY1  [phone]    Please page me directly with any questions/concerns during regular weekday hours before 5 pm. If there is no response, if it is a weekend, or if it is during evening hours then please page the orthopedic surgery resident on call.    FOLLOWUP:    Future Appointments   Date Time Provider Department Center   10/1/2022  4:45 PM Erika Swain PT [facility]   12/16/2022 11:30 AM Vivian Silva MD [facility]

## 2022-10-01 NOTE — PLAN OF CARE
Pt A&O x's 4. VSS.pt declined frequent admission vs check. Afebrile. Pt declined CAPNO, continues pulse-os.  02 sats in the upper 90s. Lungs clear. Denies SOB, chest pain or nausea. Tolerating regular diet. Bowel sound active in all quadrants. No BM but passing gas. Voiding via self straight cath. Pain is fairly  managed with with oxycodone CMS intact, denies N/T. Left leg dressing clean, dry and intact. PIV patent and SL between abx. Pt slept between care and is able to make needs known, call light with in reach. Will continue to monitor.       Patient vital signs are at baseline, yes  ~ Patient able to ambulate as they were prior to admission or with assist devices provided by therapies during their stay. Pt is para  ~ Patient MUST void prior to discharge, pt self cath at baseline   ~ Patient able to tolerate oral intake to maintain hydration status,yes    ~ Patient has adequate pain control using Oral analgesics, yes  ~ Hypercapnia, hypoventilation or hypoxia resolved for at least 2 hours without supplemental oxygen, pt is on RA, pt declined continues CAPNO/ pulse -ox  ~ Deficits in sensation, mobility or coordination have resolved if spinal or regional anesthesia was used,   ~ Patient has returned to baseline mental status,

## 2022-10-01 NOTE — PLAN OF CARE
"Goal Outcome Evaluation:      VS: /71 (BP Location: Right arm)   Pulse 112   Temp (!) 96.6  F (35.9  C) (Oral)   Resp 17   Ht 1.753 m (5' 9\")   Wt 79.4 kg (175 lb)   SpO2 100%   BMI 25.84 kg/m     O2: Stable on RA.   Output:  Nerogenic bladder, self cath.   Last BM: 9/30 per pt report   Activity: Wheel chair bound, self transfer.   Skin: Incision, L knee.   Pain: PO oxycodone,    Neuro: Intact. AxOx4   Dressing: CDI   Diet: Regular diet.   LDA: PIV SL.   Equipment: Personal wheel chair, call light with in reach.   Plan: Possible discharge tomorrow.    Additional Info: X-ray today result are pending.     "

## 2022-10-01 NOTE — PHARMACY-ADMISSION MEDICATION HISTORY
Admission Medication History Completed by Pharmacy     See [facility] Admission Navigator for allergy information, preferred outpatient pharmacy, prior to admission medications and immunization status.     Medication History Sources:     Patient & Surescripts    Changes made to PTA medication list (reason):    Added: Senokot s/Miralax/APAP    Deleted: Fluconazole, gabapentin    Changed: None    Additional Information:    None    Prior to Admission medications    Medication Sig Last Dose Taking? Auth Provider Long Term End Date   acetaminophen (TYLENOL) 325 MG tablet Take 2 tablets (650 mg) by mouth every 4 hours as needed for other (For optimal non-opioid multimodal pain management to improve pain control.)  Yes Mely Singh MD                       aspirin 81 MG EC tablet Take 1 tablet (81 mg) by mouth 2 times daily  Yes Perry Kimble MD     ibuprofen (ADVIL/MOTRIN) 600 MG tablet Take 1 tablet (600 mg) by mouth every 6 hours as needed (mild)  Yes Perry Kimble MD     oxyCODONE (ROXICODONE) 5 MG tablet Take 1 tablet (5 mg) by mouth every 4 hours as needed for breakthrough pain  Yes Mely Singh MD              polyethylene glycol (MIRALAX) 17 g packet Take 17 g by mouth daily  Yes Perry Kimble MD     senna-docusate (SENOKOT-S/PERICOLACE) 8.6-50 MG tablet Take 1-2 tablets by mouth 2 times daily for 30 days Take while on oral narcotics to prevent or treat constipation.  Yes Mely Singh MD  10/31/22   x         cefdinir (OMNICEF) 300 MG capsule Take 1 capsule (300 mg) by mouth every 12 hours for 14 days   Irma Frias APRN CNP  10/8/22   Lubricants (LUBRICATING JELLY EX) APPLY JELLY AS DIRECTED FOR SELF CATHING AND BOWEL PROGRAM **USE SEPARATE TUBES TO AVOID CONTAMINATION   Reported, Patient     VITAMIN D, CHOLECALCIFEROL, PO Take 1,000 Units by mouth daily    Reported, Patient         Date completed: 10/01/22    Medication history completed by: Cristine Taveras [facility]

## 2022-10-01 NOTE — PLAN OF CARE
OT: Orders received and chart review completed. Writer met with pt to discuss care plan, no IP OT needs were identified. D/t baseline paraplegia pt does not think that the NWB status of LLE will impact his ability to complete ADLs and pt has assist from his son for cares as needed. Pt did state he is interested in receiving a leg extension piece for his wheelchair. Writer will complete orders, please re-consult if any issues arise.

## 2022-10-01 NOTE — PROGRESS NOTES
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     #Failed conservative mgt for left displaced spiral distal femur fracture  #Left leg pain  Initial encounter on 9/24 with left knee XR showing minimal displacement of fracture. On reassessment, XR left knee \"The degree of displacement of the fracture fragments has significantly   increased since the prior 09/23/2022 exam.\"  OR on 9/30 ORIF of left distal femur fracture.   Plan per ortho outlined below:  - Activity: Up with assist until independent. Knee ROMAT  - Weightbearing Status: No weight bearing on LLE x 6 weeks   - Pain Management: Transition from IV to PO as tolerated.   - Antibiotics: Ancef 1 g Q8H x2 doses --> resuming home Cefdinir 300 mg BID for 9 additional days , 1 gram of vancomycin powder placed at fracture site  - DVT Prophylaxis: Mechanical Lovenox 40mg daily while in house. On discharge, patient should take 81mg ASA daily x 6 weeks.   - Bracing/Splinting: KI for comfort only   - Dressings: Keep tegaderm C/D/I x 7 days  - Drains: none  - Carrion catheter: None    - Physical Therapy/Occupational Therapy: Evaluation and treatment.  - Cultures: Pending; single culture of murky fracture hematoma obtained   - Follow-up: Clinic in 2 weeks for a wound check and with Dr. Singh in 6 weeks with repeat radiographs.  -Postop hemoglobin stable.  BMP unremarkable.      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      Neurogenic bladder (self caths)  K. pneumoniae UTI  9/24 UA grossly positive and culture K. Pneumoniae is ampicillin resistant and otherwise pan-positive. 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  From medical standpoint, patient is clear for discharge.    Expected Discharge Date: 10/02/2022      Destination: home          The patient's care was discussed with the Patient.    GEE CARRILLO MD  Hospitalist Service, GOLD TEAM [address] [place] [address]  Securely message with the Vocera Web Console (learn more here)  Text page via [facility] Paging/Directory   Please see signed in provider for up to date coverage information      Clinically Significant Risk Factors Present on Admission               # Overweight: Estimated body mass index is 25.84 kg/m  as calculated from the following:    Height as of this encounter: 1.753 m (5' 9\").    Weight as of this encounter: 79.4 kg (175 lb).        ______________________________________________________________________    Interval History   OR yesterday for ORIF left distal fracture  Feels good this morning     Data reviewed " today: I reviewed all medications, new labs and imaging results over the last 24 hours. I personally reviewed no images or EKG's today.    Physical Exam   Vital Signs: Temp: 97.7  F (36.5  C) Temp src: Oral BP: 106/49 Pulse: 89   Resp: 17 SpO2: 100 % O2 Device: None (Room air) Oxygen Delivery: 6 LPM  Weight: 175 lbs 0 oz    General Appearance: Sitting comfortably in bed, in no acute distress or discomfort.  HEENT: PERRL: EOMI; moist mucous membrane w/o lesions  Neck: No JVD  Pulmonary: Clear to auscultation bilaterally, no wheezes or crackles  CVS: Regular rhythm, no murmurs, rubs or gallops  GI: BS (+), soft nontender, no rebound or guarding   Extremities: bilateral LE paraplegia. No focal deficits  Skin: No rashes or lesions  Neurologic: A&O x3      Data   Recent Labs   Lab 10/01/22  0736 09/30/22  0643 09/29/22  2314 09/29/22  2313 09/29/22  1829   WBC 10.5  --   --   --  10.3   HGB 10.6*  --   --   --  10.5*   MCV 91  --   --   --  92     --   --   --  251   INR  --   --   --  1.12  --      --  136  --  133*   POTASSIUM 4.0  --  4.2  --  4.0   CHLORIDE 104  --  98  --  97*   CO2 28  --  23  --  26   BUN 10  --  9.2  --  9.3   CR 0.69  --  0.84  --  0.80   ANIONGAP 6  --  15  --  10   SHAI 9.3  --  9.5  --  9.1   GLC 96 122* 78  --  90   ALBUMIN  --   --  3.4*  --  3.5   PROTTOTAL  --   --  6.6  --  6.7   BILITOTAL  --   --  0.7  --  0.6   ALKPHOS  --   --  69  --  75   ALT  --   --  14  --  15   AST  --   --  24  --  24     Recent Results (from the past 24 hour(s))   XR Surgery URBANO L/T 5 Min Fluoro    Narrative    This exam was marked as non-reportable because it will not be read by a   radiologist or a [place] non-radiologist provider.           Medications     lactated ringers 100 mL/hr at 09/30/22 2325     sodium chloride Stopped (09/30/22 0300)       acetaminophen  975 mg Oral Q8H     cefdinir  300 mg Oral Q12H     enoxaparin ANTICOAGULANT  40 mg Subcutaneous Q24H     ketorolac  15 mg  Intravenous Q6H     polyethylene glycol  17 g Oral Daily     senna-docusate  1 tablet Oral BID    Or     senna-docusate  2 tablet Oral BID     sodium chloride (PF)  3 mL Intracatheter Q8H     sodium chloride   Intravenous Once

## 2022-10-01 NOTE — PLAN OF CARE
Pt arrived on 5 ortho room 526 close to 07 pm, pt oriented to room and call light, A&O X 4, makes need known as call light is within reach, will continue to monitor.

## 2022-10-01 NOTE — ANESTHESIA POSTPROCEDURE EVALUATION
Patient: Arnold Duke    Procedure: Procedure(s):  OPEN REDUCTION INTERNAL FIXATION, FRACTURE, FEMUR       Anesthesia Type:  General    Note:  Disposition: Inpatient   Postop Pain Control: Uneventful            Sign Out: Well controlled pain   PONV: No   Neuro/Psych: Uneventful            Sign Out: Acceptable/Baseline neuro status   Airway/Respiratory: Uneventful            Sign Out: Acceptable/Baseline resp. status   CV/Hemodynamics: Uneventful            Sign Out: Acceptable CV status; No obvious hypovolemia; No obvious fluid overload   Other NRE: NONE   DID A NON-ROUTINE EVENT OCCUR? No           Last vitals:  Vitals Value Taken Time   /68 09/30/22 1911   Temp 36.5  C (97.7  F) 09/30/22 1911   Pulse 97 09/30/22 1911   Resp 17 09/30/22 1911   SpO2 100 % 09/30/22 1911       Electronically Signed By: Corazon Benito MD  September 30, 2022  7:15 PM

## 2022-10-02 VITALS
DIASTOLIC BLOOD PRESSURE: 66 MMHG | OXYGEN SATURATION: 97 % | TEMPERATURE: 98.3 F | HEIGHT: 69 IN | BODY MASS INDEX: 25.92 KG/M2 | HEART RATE: 111 BPM | SYSTOLIC BLOOD PRESSURE: 103 MMHG | RESPIRATION RATE: 16 BRPM | WEIGHT: 175 LBS

## 2022-10-02 PROCEDURE — 99232 SBSQ HOSP IP/OBS MODERATE 35: CPT | Mod: FS | Performed by: INTERNAL MEDICINE

## 2022-10-02 PROCEDURE — 250N000011 HC RX IP 250 OP 636: Performed by: STUDENT IN AN ORGANIZED HEALTH CARE EDUCATION/TRAINING PROGRAM

## 2022-10-02 PROCEDURE — 250N000013 HC RX MED GY IP 250 OP 250 PS 637: Performed by: STUDENT IN AN ORGANIZED HEALTH CARE EDUCATION/TRAINING PROGRAM

## 2022-10-02 PROCEDURE — 999N000147 HC STATISTIC PT IP EVAL DEFER

## 2022-10-02 PROCEDURE — 120N000002 HC R&B MED SURG/OB UMMC

## 2022-10-02 PROCEDURE — 250N000013 HC RX MED GY IP 250 OP 250 PS 637: Performed by: PHYSICIAN ASSISTANT

## 2022-10-02 PROCEDURE — 99207 PR APP CREDIT; MD BILLING SHARED VISIT: CPT | Performed by: STUDENT IN AN ORGANIZED HEALTH CARE EDUCATION/TRAINING PROGRAM

## 2022-10-02 RX ORDER — IBUPROFEN 600 MG/1
600 TABLET, FILM COATED ORAL EVERY 6 HOURS PRN
Qty: 30 TABLET | Refills: 1 | Status: SHIPPED | OUTPATIENT
Start: 2022-10-02

## 2022-10-02 RX ORDER — ASPIRIN 81 MG/1
81 TABLET ORAL 2 TIMES DAILY
Qty: 60 TABLET | Refills: 0 | Status: SHIPPED | OUTPATIENT
Start: 2022-10-02

## 2022-10-02 RX ORDER — ACETAMINOPHEN 325 MG/1
650 TABLET ORAL EVERY 4 HOURS PRN
Qty: 100 TABLET | Refills: 1 | Status: SHIPPED | OUTPATIENT
Start: 2022-10-03

## 2022-10-02 RX ORDER — POLYETHYLENE GLYCOL 3350 17 G/17G
17 POWDER, FOR SOLUTION ORAL DAILY
Qty: 510 G | Refills: 3 | Status: SHIPPED | OUTPATIENT
Start: 2022-10-02 | End: 2022-11-16

## 2022-10-02 RX ADMIN — POLYETHYLENE GLYCOL 3350 17 G: 17 POWDER, FOR SOLUTION ORAL at 08:58

## 2022-10-02 RX ADMIN — CEFDINIR 300 MG: 300 CAPSULE ORAL at 08:58

## 2022-10-02 RX ADMIN — CEFDINIR 300 MG: 300 CAPSULE ORAL at 21:01

## 2022-10-02 RX ADMIN — OXYCODONE HYDROCHLORIDE 10 MG: 10 TABLET ORAL at 17:28

## 2022-10-02 RX ADMIN — SENNOSIDES AND DOCUSATE SODIUM 2 TABLET: 50; 8.6 TABLET ORAL at 08:58

## 2022-10-02 RX ADMIN — ENOXAPARIN SODIUM 40 MG: 40 INJECTION SUBCUTANEOUS at 17:29

## 2022-10-02 RX ADMIN — OXYCODONE HYDROCHLORIDE 10 MG: 10 TABLET ORAL at 08:58

## 2022-10-02 RX ADMIN — SENNOSIDES AND DOCUSATE SODIUM 2 TABLET: 50; 8.6 TABLET ORAL at 21:01

## 2022-10-02 RX ADMIN — METHOCARBAMOL 750 MG: 750 TABLET ORAL at 08:58

## 2022-10-02 RX ADMIN — METHOCARBAMOL 750 MG: 750 TABLET ORAL at 17:28

## 2022-10-02 RX ADMIN — OXYCODONE HYDROCHLORIDE 10 MG: 10 TABLET ORAL at 21:11

## 2022-10-02 ASSESSMENT — ACTIVITIES OF DAILY LIVING (ADL)
ADLS_ACUITY_SCORE: 35

## 2022-10-02 NOTE — DISCHARGE INSTRUCTIONS
Follow-up: Clinic in 2 weeks for a wound check and with Dr. Singh in 6 weeks with repeat radiographs.

## 2022-10-02 NOTE — PROGRESS NOTES
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    10/2  - Patient ready to discharge from a medical perspective  - Per conversation with nursing, patient needs an extension on his wheelchair since he cannot straighten his L leg following surgery  - If we are able to obtain this extension then patient may discharge today with plan laid out by ortho below   - Discharge med rec has been completed    # Failed conservative mgt for left displaced spiral distal femur fracture now S/P L ORIF 9/30  # Left leg pain  Initial encounter on 9/24 with left knee XR showing minimal displacement of fracture. On reassessment, XR left knee \"The degree of displacement of the fracture fragments has significantly increased since the prior 09/23/2022 exam.\"  - Ortho is primary   - S/P ORIF of L distal femur fracture 9/30  - Plan per ortho outlined below:  - Activity: Up with assist until independent. Knee ROMAT  - Weightbearing Status: No weight bearing on LLE x 6 weeks   - Pain Management: Transition from IV to PO as tolerated.   - Antibiotics:   -   Cefdinir 300 mg BID for 9 additional days post op   - 1 gram of vancomycin powder placed at fracture site  - DVT Prophylaxis: Mechanical Lovenox 40mg daily while in house. On discharge, patient should take 81mg ASA daily x 6 weeks.   - Bracing/Splinting: KI for comfort only   - Dressings: Keep tegaderm C/D/I x 7 days  - Drains: none  - Carrion " catheter: None    - Physical Therapy/Occupational Therapy: Evaluation and treatment.  - Cultures: single culture of murky fracture hematoma obtained - NGTD  - Follow-up: Clinic in 2 weeks for a wound check and with Dr. Singh in 6 weeks with repeat radiographs.  -Postop hemoglobin stable.  BMP unremarkable.     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 pneumoniae UTI  9/24 UA grossly positive and culture K. Pneumoniae is ampicillin resistant and otherwise pan-positive. 9/29 UA with 4 WBC and trace LE, small albumin  - continue cefdinir for total 14d course  - resumed clean intermittent cath at least 4x daily and prn     Pelvic osteomyelitis and stage 4 sacral wounds   - WOCN consult      Slow transit-associated constipation   - continue PTA senna  - continure prn miralax  - dulcolax suppository prn     Hx tobacco   - encourage cessation           Diet: Advance Diet as Tolerated: Regular Diet Adult  Discharge Instruction - Regular Diet Adult    DVT Prophylaxis: Enoxaparin (Lovenox) SQ  Carrion Catheter: Not present  Central Lines: None  Cardiac Monitoring: None  Code Status: Full Code      Disposition Plan   { TIP  Click here to update expected discharge date and refresh note (tipsheet)     :81277}  Expected Discharge Date: 10/02/2022      Destination: home          The patient's care was discussed with the Attending Physician, Dr. Hendrix and Patient.    Rhys Cote PA-C  Hospitalist Service, GOLD TEAM [address] [place] [address]  Securely message with the Vocera Web Console (learn more here)  Text page via [employer] Paging/Directory   Please see signed in provider for up to date coverage information      Clinically Significant Risk Factors Present on Admission               # Overweight: Estimated body mass index  "is 25.84 kg/m  as calculated from the following:    Height as of this encounter: 1.753 m (5' 9\").    Weight as of this encounter: 79.4 kg (175 lb).        ______________________________________________________________________    Interval History   Patient doing well this morning. Has had a BM. Biggest concern is getting a L leg extension for his wheelchair     Data reviewed today: I reviewed all medications, new labs and imaging results over the last 24 hours. Please see discussion of these results in the A/P.    Physical Exam     Vital Signs: Temp: 96.9  F (36.1  C) Temp src: Oral BP: 125/71 Pulse: 94     Resp: 16 SpO2: 98 % O2 Device: None (Room air)    Weight: 175 lbs 0 oz    Constitutional: awake, alert, cooperative, in no acute distress. A&Ox3  Eyes: EOM, PERRLA. Sclera clear, conjunctiva normal. Anicteric   HENT: Normocephalic, without obvious abnormality, atraumatic.   Respiratory: No increased work of breathing. Clear to auscultation bilaterally, no crackles or wheezing  Cardiovascular: RRR. No murmur or friction rub. No edema. No chest wall tenderness.  GI: Soft, non-tender without rebound or guarding. Bowel sounds are active.   Skin: no bruising or bleeding. Normal skin color, No jaundice  Musculoskeletal:  Bilateral LE paraplegia. L LE in post surgical cast.  Neurologic: Cranial nerves II-XII are grossly intact.   Neuropsychiatric: General: normal, calm and normal eye contact. Normal affect          Data   Recent Labs   Lab 10/01/22  0736 09/30/22  0643 09/29/22  2314 09/29/22  2313 09/29/22  1829   WBC 10.5  --   --   --  10.3   HGB 10.6*  --   --   --  10.5*   MCV 91  --   --   --  92     --   --   --  251   INR  --   --   --  1.12  --      --  136  --  133*   POTASSIUM 4.0  --  4.2  --  4.0   CHLORIDE 104  --  98  --  97*   CO2 28  --  23  --  26   BUN 10  --  9.2  --  9.3   CR 0.69  --  0.84  --  0.80   ANIONGAP 6  --  15  --  10   SHAI 9.3  --  9.5  --  9.1   GLC 96 122* 78  --  90 "   ALBUMIN  --   --  3.4*  --  3.5   PROTTOTAL  --   --  6.6  --  6.7   BILITOTAL  --   --  0.7  --  0.6   ALKPHOS  --   --  69  --  75   ALT  --   --  14  --  15   AST  --   --  24  --  24       Recent Results (from the past 24 hour(s))   XR Femur Left 2 Views    Narrative    EXAM: XR FEMUR LEFT 2 VIEWS  LOCATION: [facility]  DATE/TIME: 10/01/2022, 1:37 PM    INDICATION: Left hip pain.  COMPARISON: 09/29/2022.      Impression    IMPRESSION: Lateral screw and plate fixation across an acute moderately displaced comminuted fracture of the distal femur with intra-articular extension of the fracture. Small chronic ununited fracture of the left greater trochanter.

## 2022-10-02 NOTE — PLAN OF CARE
Goal Outcome Evaluation:    VS: Refused vitals    O2: RA, stable. Denies chest pain/SOB   Output: Self caths (neurogenic bladder)   Last BM: 9/30   Activity: SBA, self transports (wheelchair)   Skin: L leg surgical incision   Pain: Managed with PRN Oxycodone   CMS: A&Ox4. Denies N/T   Dressing: CDI   Diet: Regular    LDA: PIV - SL    Equipment: Personal belongings, wheelchair, call light within reach    Plan: Possible discharge today pending pain control, PT    Additional Info: MD ordering leg extension piece for wheelchair  0400 - refused vitals, scheduled Tylenol, and PRN Oxycodone

## 2022-10-02 NOTE — PROGRESS NOTES
PT: Orders acknowledged, chart reviewed. Spoke with pt's RN. Met with pt. Pt reports he is at baseline mobility and politely declines PT services. However, he is concerned about obtaining elevating leg rests for his wheelchair, stating that extensions won't fit onto his chair and he is unsure of how he will keep his leg elevated. I did relay these concerns back to RN.    Will complete orders.

## 2022-10-02 NOTE — PROGRESS NOTES
"Orthopedic Surgery Progress Note: 10/02/2022    Subjective:   No acute events overnight. Pain well-controlled. Tolerating PO. Patient straight caths at baseline. Denies CP/SOB. No new concerns or complaints. Feels like he would be ready to discharge home if he has a bowel movement today    Objective:   /71 (BP Location: Right arm)   Pulse 94   Temp 96.9  F (36.1  C) (Oral)   Resp 16   Ht 1.753 m (5' 9\")   Wt 79.4 kg (175 lb)   SpO2 98%   BMI 25.84 kg/m    No intake/output data recorded.  General: NAD. Resting comfortably in bed.  Respiratory: Breathing comfortably on RA.  Drain Output: none   Musculoskeletal:           LLE  Dressings clean and dry   No motor/sensation about the foot   Foot is WWP      Laboratory Data:  Lab Results   Component Value Date    WBC 10.5 10/01/2022    HGB 10.6 (L) 10/01/2022     10/01/2022    INR 1.12 09/29/2022       Images:  No new images were obtained.    Assessment & Plan:   Arnold Duke is a 57 year old male with PMH including paraplegia and a left distal femur fracture now s/p ORIF on 9/30/22 with Dr. Singh.     Goals for  today:  Please obtain leg extension piece for wheelchair  If patient has BM, he can likely discharge later this afternoon.     Medicine Primary, Orthopaedics consulting  - Activity: Up with assist until independent. Knee ROMAT  - Weightbearing Status: No weight bearing on LLE x 6 weeks   - Pain Management: Transition from IV to PO as tolerated.   - Antibiotics: Ancef 1 g Q8H for 24 hours, 1 gram of vancomycin powder placed at fracture site  - Diet: Begin with clear fluids and progress diet as tolerated.   - DVT Prophylaxis: Mechanical Lovenox 40mg daily while in house. On discharge, patient should take 162mg ASA daily x 6 weeks.   - Imaging: XR of Left Knee and femur in PACU  - Labs: Hgb and BMP in the am.   - Bracing/Splinting: KI for comfort only   - Dressings: Keep tegaderm C/D/I x 7 days  - Drains: none  - Carrion catheter: None    - " Physical Therapy/Occupational Therapy: Evaluation and treatment.  - Cultures: Pending; single culture of murky fracture hematoma obtained   - Consults: Hospitalist team primary, PT, OT.  - Follow-up: Clinic in 2 weeks for a wound check and with Dr. Singh in 6 weeks with repeat radiographs.  - Disposition: Pending progress with therapies, pain control on orals, and medical stability;     Orthopedic surgery staff for this patient is Dr. Singh.    ------------------------------------------------------------------------------------------    Perry Kimble MD  Orthopedic Surgery PGY1  [phone]    Please page me directly with any questions/concerns during regular weekday hours before 5 pm. If there is no response, if it is a weekend, or if it is during evening hours then please page the orthopedic surgery resident on call.    FOLLOWUP:    Future Appointments   Date Time Provider Department Center   10/1/2022  4:45 PM Erika Swain PT [facility]   12/16/2022 11:30 AM Vivian Silva MD [facility]

## 2022-10-02 NOTE — PLAN OF CARE
"Goal Outcome Evaluation:      VS: /71 (BP Location: Right arm)   Pulse 94   Temp 96.9  F (36.1  C) (Oral)   Resp 16   Ht 1.753 m (5' 9\")   Wt 79.4 kg (175 lb)   SpO2 98%   BMI 25.84 kg/m       O2: Stable on RA.   Output: Self cath.   Last BM: 10/2   Activity: Wheel chair at baseline, paraplegia.   Skin: Sacara wound, surgical incision, otherwise intact.   Pain: Po oxycodone.   Neuro: Intact.   Dressing: CDI ace wrap and hinged knee brace.   Diet: Regular diet.   LDA: PIV SL.   Equipment: Straight cath kits, call light with in reach.   Plan: Home tomorrow.   Additional Info: Pt has his discharge medication with him, discharge paper is signed. Waiting on rental wheel chair tomorrow morning.      "

## 2022-10-02 NOTE — PROGRESS NOTES
Care Management Follow Up    Length of Stay (days): 3    Expected Discharge Date: 10/02/2022     Concerns to be Addressed: WC elevating leg rest   Anticipated Discharge Disposition:  Home  Anticipated Discharge Services:  Transportation  Anticipated Discharge DME:  Patient will need rental WC with elevating leg rests while recovering from surgical procedure d/t needing leg elevated.  Referrals Placed by CM/SW:  [facility]       Additional Information:  Per Ortho note from 10/02   -Goals for  today:    Please obtain leg extension piece for wheelchair    If patient has BM, he can likely discharge later this afternoon.     Per patient his WC is not able to accommodate leg rests and therefore will need to rent a WC for appropriate recovery.  Patient is a paraplegic and for leg to be dependent is not conducive to post surgical healing.    Awaiting orders for rental WC with elevating leg rests for discharge    Yanira Keller RN, BSN  5B RN Care Coordinator  [phone] phone  [phone] pager    Weekend or Holiday Care Coordinator [phone] pager  Job Code [phone]      Yanira Keller RN

## 2022-10-02 NOTE — PROGRESS NOTES
Care Management Discharge Note        Cancelled @ 1532 patient needs rental WC with leg elevating leg rests    Discharge Date: 10/02/2022       Discharge Disposition:  Home    Discharge Services:  [facility] WC at 1700    Discharge DME:  none    Discharge Transportation:   [facility] WC @ 1700    Education Provided on the Discharge Plan:  Yes  Persons Notified of Discharge Plans: Yes  Patient/Family in Agreement with the Plan:  Yes    Handoff Referral Completed: Yes    Additional Information:        Yanira Keller RN, BSN  5B RN Care Coordinator  [phone] phone  [phone] pager    Weekend or Holiday Care Coordinator [phone] pager  Job Code [phone]  Yanira Keller RN

## 2022-10-03 ENCOUNTER — TELEPHONE (OUTPATIENT)
Dept: ORTHOPEDICS | Facility: CLINIC | Age: 57
End: 2022-10-03

## 2022-10-03 PROCEDURE — G0463 HOSPITAL OUTPT CLINIC VISIT: HCPCS

## 2022-10-03 PROCEDURE — 99232 SBSQ HOSP IP/OBS MODERATE 35: CPT | Performed by: INTERNAL MEDICINE

## 2022-10-03 ASSESSMENT — ACTIVITIES OF DAILY LIVING (ADL)
ADLS_ACUITY_SCORE: 35
ADLS_ACUITY_SCORE: 37
ADLS_ACUITY_SCORE: 35

## 2022-10-03 NOTE — PLAN OF CARE
VS: Refused vital signs   O2: Stable on room air, denies SOB and CP, no cough present. Lung sounds clear.   Output: Pt straight caths self to void. Voiding adequately   Last BM: 10/03/2022   Activity: Paraplegic, pt able to transfer independently/with minimal assistance   Skin: LLE surgical incision   Pain: Denies this shift   Neuro: A & O x4, sensation absent in BLEs (para)   Dressing: CDI   Diet: Regular   LDA: N/A   Equipment: Wheelchair, call light, personal belongings   Plan: Discharge to home today.   Additional Info:      Pt. discharged at 1820 via [facility] to home. Pt. was accompanied by EMS, and left with personal belongings. Prior to discharge, PIV was removed. Pt. received complete discharge paperwork. Pt. was given times of last dose for all discharge medications in writing on discharge medication sheets.  Discharge teaching included medication management, pain management, activity restrictions, dressing changes, and signs and symptoms of infection. Pt. to follow up with provider as scheduled. Pt. had no further questions at the time of discharge and no unmet needs were identified.

## 2022-10-03 NOTE — PROGRESS NOTES
Care Management follow up:    Contacted patient's vendor [employer]  [phone]. re: leg rest attachment. His current wheelchair does not accommodate the leg rests required for his fracture.   Patient's payor has already paid outright for the wheelchair, so unable to use a different company.  Stephanie has contact [employer] to determine if there is a loaner available for the patient.     Speaking with Stephanie, the insurance company would have to issue a credit, a new evaluation would need to be completed and could take up to four months for the patient to get new wheelchair.     Stephanie has reached out to [employer] and will continue to work towards a plan for the proper wheelchair.Stephanie at [employer] @ [phone] to ensure the proper equipment. Patient updated and in agreement with plan. RNCC will continue to follow.     Update: patient is setup with [facility] via stretcher at 5:45 pm. Contact number is [phone]      Vidhya Wheeler RN

## 2022-10-03 NOTE — TELEPHONE ENCOUNTER
----- Message from Mely Singh MD sent at 9/30/2022  5:41 PM CDT -----  Distal Femur fracture from call today.  He should have a 2 week postop appointment with femur XRs (AP and Lat) with me

## 2022-10-03 NOTE — PROGRESS NOTES
Wheelchair Order  As directed          Wheelchair Type: Standard         Length of Need: Lifelong         Leg Rests: Elevating         Arm Rests: Adjustable         The face to face evaluation was performed on: 10/3/2022         Wheelchair Documentation:   Describe the reason for need to support medical necessity: Paraplegia with new leg injury requiring elevated leg rests   1. The patient has mobility limitations that impairs their ability to participate in one or more mobility related activities: Toileting, Bathing, and Transportation.   2. The patient's mobility limitations cannot be safely resolved by using a cane/walker: Yes   3. The patients home has adequate access to use a manual wheelchair: Yes   4. The use of a manual wheelchair on a regular basis will improve the patients ability to participate in mobility related ADL's at home: Yes   5. The patient is willing to use a manual wheelchair at home: Yes   6. The patient has adequate upper body strength and the mental capability to safely use a manual wheelchair and/or has a caregiver that is able to assist: Yes   7. Does the patient have a lower extremity injury or edema?: Yes     Reason for Type of Wheelchair: Light Weight Wheelchair: Patient is unable to self-propel a standard wheelchair in the home but can self propel a light weight wheelchair.     **Use of a manual wheelchair will significantly improve the patient's ability to participate in MRADLs and the patient will use it on a regular basis in the home. The patient has not expressed an unwillingness to use the manual wheelchair that is provided in the home.**     I, the undersigned, certify that the above prescribed supplies are medically necessary for this patient and is both reasonable and necessary in reference to accepted standards of medical and necessary in reference to accepted standards of medical practice in the treatment of this patient's condition and is not prescribed as a convenience.          Other fracture of left femur, initial encounter for closed fracture (H)

## 2022-10-03 NOTE — PROGRESS NOTES
"Orthopedic Surgery Progress Note: 10/03/2022    Subjective:   Unable to obtain leg extensions that work for patient's wheelchair yesterday. Patient otherwise ready for discharge today. . Pain well-controlled. Tolerating PO. Denies CP/SOB. No new concerns or complaints.    Objective:   /66 (BP Location: Right arm)   Pulse 111   Temp 98.3  F (36.8  C) (Oral)   Resp 16   Ht 1.753 m (5' 9\")   Wt 79.4 kg (175 lb)   SpO2 97%   BMI 25.84 kg/m    No intake/output data recorded.  General: NAD. Resting comfortably in bed.  Respiratory: Breathing comfortably on RA.  Drain Output: none   Musculoskeletal:           LLE  Dressings clean and dry   No motor/sensation about the foot   Foot is WWP      Laboratory Data:  Lab Results   Component Value Date    WBC 10.5 10/01/2022    HGB 10.6 (L) 10/01/2022     10/01/2022    INR 1.12 09/29/2022       Images:  No new images were obtained.    Assessment & Plan:   Arnold Duke is a 57 year old male with PMH including paraplegia and a left distal femur fracture now s/p ORIF on 9/30/22 with Dr. Singh.     Goals for  today:  Order placed for rental wheelchair with leg extensions  Once obtained, patient can discharge to home with family cares       Plan:  Orthopaedics primary  - Activity: Up with assist until independent. Knee ROMAT  - Weightbearing Status: No weight bearing on LLE x 6 weeks   - Pain Management: Transition from IV to PO as tolerated.   - Antibiotics: Ancef 1 g Q8H for 24 hours, 1 gram of vancomycin powder placed at fracture site  - Diet: Begin with clear fluids and progress diet as tolerated.   - DVT Prophylaxis: Mechanical Lovenox 40mg daily while in house. On discharge, patient should take 162mg ASA daily x 6 weeks.   - Imaging: XR of Left Knee and femur in PACU  - Labs: Hgb and BMP in the am.   - Bracing/Splinting: KI for comfort only   - Dressings: Keep tegaderm C/D/I x 7 days  - Drains: none  - Carrion catheter: None    - Physical " Therapy/Occupational Therapy: Evaluation and treatment.  - Cultures: Pending; single culture of murky fracture hematoma obtained   - Consults: Hospitalist team primary, PT, OT.  - Follow-up: Clinic in 2 weeks for a wound check and with Dr. Singh in 6 weeks with repeat radiographs.  - Disposition: Pending progress with therapies, pain control on orals, and medical stability;     Orthopedic surgery staff for this patient is Dr. Singh.    ------------------------------------------------------------------------------------------    Perry Kimble MD  Orthopedic Surgery PGY1  [phone]    Please page me directly with any questions/concerns during regular weekday hours before 5 pm. If there is no response, if it is a weekend, or if it is during evening hours then please page the orthopedic surgery resident on call.    FOLLOWUP:    Future Appointments   Date Time Provider Department Center   10/1/2022  4:45 PM Erika Swain PT [facility]   12/16/2022 11:30 AM Vivian Silva MD [facility]

## 2022-10-03 NOTE — PROGRESS NOTES
Alert and oriented times four. All discharge instructions/paperwork reviewed with patient. [facility] transport scheduled for 5:45pm today. Refused medications. Assist of two for transfer between surfaces. Large BM. Straight cath with bedside urinal. Ate 75% of breakfast and lunch.

## 2022-10-03 NOTE — CONSULTS
"WOC Consult    S: WOC Consult to evaluate and treat chronic Stage 4 sacral pressure injury.     B: Per Dr Michael Hendrix on 10/3/2022: Arnold Duke is a 57 year old male with PMH of paraplegia secondary to cauda equina in the setting of disseminated coccidiomycosis, hydrocephalus s/p  shunt, neurogenic bladder (self caths), 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    A: Per MD note, patient has \"Pelvic osteomyelitis and stage 4 sacral wounds\".  WOC presented to patient to assess wounds. Per patient, he has not had an open wound to his buttocks or sacrum in \"10-12 years\". No nursing documentation or LDA for wound on buttock or sacrum. Patient declining assessment today.     P: WOC will sign off. If open area is noted, please re-consult.     Luci Marin RN, CWOCN  Pager [phone]    "

## 2022-10-03 NOTE — PROGRESS NOTES
Care Management Discharge Note    Discharge Date:10/03/2022     Discharge Disposition:  Home    Discharge DME:  Wheelchair with elevated leg rests    Discharge Transportation:      Additional Information:  Patient currently has a wheelchair from [employer]. Patient needs leg rests, his current wheelchair does not have an area to attach leg rests. A new order was for a light weight manual wheelchair was sent to [facility]. Patient wants to discharge today and may not wait for wheelchair confirmation. In this case the wheelchair will be delivered to his home. RNCC will continue to follow.    [facility]  Phone [phone]  Fax [phone]    Janette Norman RN, BSN  Care Coordinator, 5 Ortho  Phone [phone]  Pager [phone]

## 2022-10-03 NOTE — PROGRESS NOTES
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    10/3  - Patient ready to discharge from a medical perspective  - Per conversation with nursing, patient needs an extension on his wheelchair, so will likely have that set-up completed today   - Discharge med rec has been completed    # Failed conservative mgt for left displaced spiral distal femur fracture now S/P L ORIF 9/30  # Left leg pain  Initial encounter on 9/24 with left knee XR showing minimal displacement of fracture. On reassessment, XR left knee \"The degree of displacement of the fracture fragments has significantly increased since the prior 09/23/2022 exam.\"  - Ortho is primary   - S/P ORIF of L distal femur fracture 9/30  - Plan per ortho outlined below:  - Activity: Up with assist until independent. Knee ROMAT  - Weightbearing Status: No weight bearing on LLE x 6 weeks   - Pain Management: Transition from IV to PO as tolerated.   - Antibiotics:   -   Cefdinir 300 mg BID for a total of 14 days   - 1 gram of vancomycin powder placed at fracture site  - DVT Prophylaxis: Mechanical Lovenox 40mg daily while in house. On discharge, patient should take 81mg ASA daily x 6 weeks.   - Bracing/Splinting: KI for comfort only   - Dressings: Keep tegaderm C/D/I x 7 days  - Drains: none  - Carrion catheter: None    - Physical Therapy/Occupational Therapy: Evaluation and treatment.  - Cultures: single culture of murky " fracture hematoma obtained - NGTD  - Follow-up: Clinic in 2 weeks for a wound check and with Dr. Singh in 6 weeks with repeat radiographs.  -Postop hemoglobin stable.  BMP unremarkable.     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 pneumoniae UTI  Patient presented to the hospital with a diagnosis of UTI; risk factor includes hx of neurogenic bladder with self catheterization. Patient already on po Cefdinir prior to admission.   9/24 UA grossly positive and culture K. Pneumoniae is ampicillin resistant and otherwise pan-positive. 9/29 UA with 4 WBC and trace LE, small albumin.   - continue cefdinir for total 14d course  - resumed clean intermittent cath at least 4x daily and prn     # Hx of Pelvic osteomyelitis   # Hx stage 4 sacral wounds - due to paraplegia; present on admission has been known for several years. Noted as far back as 2014; completed IV Vanc & Ertapenem.   - WOCN consult      #Hyponatremia - resolved  Na on 09/29 133.   Likely hypovolemic hyponatremia.   Resolved with IVF with lactated ringers and dietary resumption.     Slow transit-associated constipation   - continue PTA senna  - continure prn miralax  - dulcolax suppository prn     Hx tobacco   - encourage cessation           Diet: Advance Diet as Tolerated: Regular Diet Adult  Discharge Instruction - Regular Diet Adult    DVT Prophylaxis: Enoxaparin (Lovenox) SQ  Carrion Catheter: Not present  Central Lines: None  Cardiac Monitoring: None  Code Status: Full Code      Disposition Plan       The patient's care was discussed with the Patient.    GEE CARRILLO MD  Hospitalist Service, GOLD TEAM [address] [place] [address]  Securely message with the Vocera Web Console (learn more here)  Text page via [employer] Paging/Directory   Please see signed in  "provider for up to date coverage information      Clinically Significant Risk Factors Present on Admission               # Overweight: Estimated body mass index is 25.84 kg/m  as calculated from the following:    Height as of this encounter: 1.753 m (5' 9\").    Weight as of this encounter: 79.4 kg (175 lb).        ______________________________________________________________________    Interval History   NAEON  Patient waiting for wheelchair leg extension set-up; otherwise, eager to leave for home later today    Data reviewed today: I reviewed all medications, new labs and imaging results over the last 24 hours. Please see discussion of these results in the A/P.    Physical Exam     Vital Signs: Temp: 98.3  F (36.8  C) Temp src: Oral BP: 103/66 Pulse: 111     Resp: 16 SpO2: 97 % O2 Device: None (Room air)    Weight: 175 lbs 0 oz    Constitutional: awake, alert, cooperative, in no acute distress. A&Ox3  Eyes: EOM, PERRLA. Sclera clear, conjunctiva normal. Anicteric   HENT: Normocephalic, without obvious abnormality, atraumatic.   Respiratory: No increased work of breathing. Clear to auscultation bilaterally, no crackles or wheezing  Cardiovascular: RRR. No murmur or friction rub. No edema. No chest wall tenderness.  GI: Soft, non-tender without rebound or guarding. Bowel sounds are active.   Skin: no bruising or bleeding. Normal skin color, No jaundice  Musculoskeletal:  Bilateral LE paraplegia. L LE in post surgical cast.  Neurologic: Cranial nerves II-XII are grossly intact.   Neuropsychiatric: General: normal, calm and normal eye contact. Normal affect          Data   Recent Labs   Lab 10/01/22  0736 09/30/22  0643 09/29/22  2314 09/29/22  2313 09/29/22  1829   WBC 10.5  --   --   --  10.3   HGB 10.6*  --   --   --  10.5*   MCV 91  --   --   --  92     --   --   --  251   INR  --   --   --  1.12  --      --  136  --  133*   POTASSIUM 4.0  --  4.2  --  4.0   CHLORIDE 104  --  98  --  97*   CO2 28  --  " 23  --  26   BUN 10  --  9.2  --  9.3   CR 0.69  --  0.84  --  0.80   ANIONGAP 6  --  15  --  10   SHAI 9.3  --  9.5  --  9.1   GLC 96 122* 78  --  90   ALBUMIN  --   --  3.4*  --  3.5   PROTTOTAL  --   --  6.6  --  6.7   BILITOTAL  --   --  0.7  --  0.6   ALKPHOS  --   --  69  --  75   ALT  --   --  14  --  15   AST  --   --  24  --  24       No results found for this or any previous visit (from the past 24 hour(s)).

## 2022-10-04 ENCOUNTER — PATIENT OUTREACH (OUTPATIENT)
Dept: CARE COORDINATION | Facility: CLINIC | Age: 57
End: 2022-10-04

## 2022-10-04 NOTE — DISCHARGE SUMMARY
ORTHOPAEDIC SURGERY DISCHARGE SUMMARY     Date of Admission: 9/29/2022  Date of Discharge: 10/3/2022  6:20 PM  Disposition: Home  Staff Physician: No att. providers found  Primary Care Provider: Vivian Silva    DISCHARGE DIAGNOSIS:  Closed fracture of neck of left femur, initial encounter (H) [S72.002A]    PROCEDURES: Procedure(s):  Open reduction internal fixation left distal femur fracture on 9/29/2022 - 9/30/2022    BRIEF HISTORY:  Arnold Duke is a 57 year old male with history of paraplegia who suffered a nondisplaced fracture of the left distal femur on 9/23/22 when he awkwardly twisted his left knee while transferring. He was placed into a knee immobilizer. The patient presented back to the ED due to continued pain and inability to care for himself. Repeat xrays revelaed that the fracture had subsequently displaced. We had a long discussion with the patient about risks and benefits of continued non-operative management vs surgical fixation. After this discussion the patient elected to proceed with surgery documented above     HOSPITAL COURSE:    During the operation, observation of the fracture hematoma appeared more murky than typically expected. A Culture was obtained and 1 gram of vancomycin powder was placed in the operative field. At the time of this writing (POD3) the culture has remained negative.  The patient was admitted following the above listed procedures for pain control and rehabilitation. Arnold Duke did well post-operatively. Medicine was consulted post operatively to aid in management of medical co-morbidities. The patient received routine nursing cares and at the time of discharge was medically stable. Vital signs were stable throughout admission. The patient is tolerating a regular diet and is voiding spontaneously. All PT/OT goals have been met for safe mobility. Pain is now controlled on oral medications which will be available on discharge. Stool softeners have been  "used while taking pain medications to help prevent constipation. Arnold Duke is deemed medically safe to discharge.     Antibiotics:  Ancef given periop and 24 hours postop. 1 gram of vancomycin powder was placed in the operative wound  DVT prophylaxis:  ASA 162mg every day initiated after surgery and will be continued for 4 weeks.   PT Progress:  Has met PT/OT goals for safe mobility.    Pain Meds:  Weaned off all IV pain meds by discharge.  Inpatient Events: No significant events or complications.     PHYSICAL EXAM:    /66 (BP Location: Right arm)   Pulse 111   Temp 98.3  F (36.8  C) (Oral)   Resp 16   Ht 1.753 m (5' 9\")   Wt 79.4 kg (175 lb)   SpO2 97%   BMI 25.84 kg/m    No intake/output data recorded.  General: NAD. Resting comfortably in bed.  Respiratory: Breathing comfortably on RA.  Drain Output: none   Musculoskeletal:               LLE  Dressings clean and dry   No motor/sensation about the foot   Foot is WWP    FOLLOWUP:    Follow up with Dr. Singh care team at 2 weeks postoperatively.    Future Appointments   Date Time Provider Department Center   10/12/2022 12:30 PM Mely Singh MD [facility]   12/16/2022 11:30 AM Vivian Silva MD [facility]       Orthopaedic Surgery appointments are at the [facility] ([address] [place] [address]). Call [phone] to schedule a follow-up appointment at this location with your provider.     PLANNED DISCHARGE ORDERS:     DVT Prophylaxis: ASA 162mg every day x 4 weeks     Activity: Non-weight bearing until cleared at follow up visit.      Wound Care: see Below      Discharge Medication List as of 10/3/2022  6:04 PM      CONTINUE these medications which have CHANGED    Details   acetaminophen (TYLENOL) 325 MG tablet Take 2 tablets (650 mg) by mouth every 4 hours as needed for other (For optimal non-opioid multimodal pain management to improve pain control.), Disp-100 tablet, R-1, " "E-Prescribe      aspirin 81 MG EC tablet Take 1 tablet (81 mg) by mouth 2 times daily, Disp-60 tablet, R-0, E-Prescribe      ibuprofen (ADVIL/MOTRIN) 600 MG tablet Take 1 tablet (600 mg) by mouth every 6 hours as needed (mild), Disp-30 tablet, R-1, E-Prescribe      oxyCODONE (ROXICODONE) 5 MG tablet Take 1 tablet (5 mg) by mouth every 4 hours as needed for breakthrough pain, Disp-30 tablet, R-0, E-Prescribe      polyethylene glycol (MIRALAX) 17 GM/Dose powder Take 17 g by mouth daily, Disp-510 g, R-3, E-Prescribe      !! senna-docusate (SENOKOT-S/PERICOLACE) 8.6-50 MG tablet Take 1-2 tablets by mouth 2 times daily for 30 days Take while on oral narcotics to prevent or treat constipation., Disp-120 tablet, R-1, E-Prescribe       !! - Potential duplicate medications found. Please discuss with provider.      CONTINUE these medications which have NOT CHANGED    Details   !! senna-docusate (SENOKOT-S/PERICOLACE) 8.6-50 MG tablet Take 2 tablets by mouth 2 times daily as needed for constipation, Historical      cefdinir (OMNICEF) 300 MG capsule Take 1 capsule (300 mg) by mouth every 12 hours for 14 days, Disp-28 capsule, R-0, E-Prescribe      Lubricants (LUBRICATING JELLY EX) APPLY JELLY AS DIRECTED FOR SELF CATHING AND BOWEL PROGRAM **USE SEPARATE TUBES TO AVOID CONTAMINATION, Historical      VITAMIN D, CHOLECALCIFEROL, PO Take 1,000 Units by mouth daily , Historical       !! - Potential duplicate medications found. Please discuss with provider.            Discharge Procedure Orders   Reason for your hospital stay   Order Comments: Fixation of your left distal femur fracture     When to call - Contact Surgeon Team   Order Comments: You may experience symptoms that require follow-up before your scheduled appointment. Refer to the \"Stoplight Tool\" for instructions on when to contact your Surgeon Team if you are concerned about pain control, blood clots, constipation, or if you are unable to urinate.     When to call - Reach " out to Urgent Care   Order Comments: If you are not able to reach your Surgeon Team and you need immediate care, go to the Orthopedic Walk-in Clinic or Urgent Care at your Surgeon's office.  Do NOT go to the Emergency Room unless you have shortness of breath, chest pain, or other signs of a medical emergency.     When to call - Reasons to Call 911   Order Comments: Call 911 immediately if you experience sudden-onset chest pain, arm weakness/numbness, slurred speech, or shortness of breath     Discharge Instruction - Breathing exercises   Order Comments: Perform breathing exercises using your Incentive Spirometer 10 times per hour while awake for 2 weeks.     Symptoms - Fever Management   Order Comments: A low grade fever can be expected after surgery.  Use acetaminophen (TYLENOL) as needed for fever management.  Contact your Surgeon Team if you have a fever greater than 101.5 F, chills, and/or night sweats.     Symptoms - Constipation management   Order Comments: Constipation (hard, dry bowel movements) is expected after surgery due to the combination of being less active, the anesthetic, and the opioid pain medication.  You can do the following to help reduce constipation:  ~  FLUIDS:  Drink clear liquids (water or Gatorade), or juice (apple/prune).  ~  DIET:  Eat a fiber rich diet.    ~  ACTIVITY:  Get up and move around several times a day.  Increase your activity as you are able.  MEDICATIONS:  Reduce the risk of constipation by starting medications before you are constipated.  You can take Miralax   (1 packet as directed) and/or a stool softener (Senokot 1-2 tablets 1-2 times a day).  If you already have constipation and these medications are not working, you can get magnesium citrate and use as directed.  If you continue to have constipation you can try an over the counter suppository or enema.  Call your Surgeon Team if it has been greater than 3 days since your last bowel movement.     Symptoms - Reduced  Urine Output   Order Comments: Changes in the amount of fluids you drank before and after surgery may result in problems urinating.  It is important to stay well-hydrated after surgery and drink plenty of water. If it has been greater than 8 hours since you have urinated despite drinking plenty of water, call your Surgeon Team.     Comfort and Pain Management - Pain after Surgery   Order Comments: Pain after surgery is normal and expected.  You will have some amount of pain for several weeks after surgery.  Your pain will improve with time.  There are several things you can do to help reduce your pain including: rest, ice, elevation, and using pain medications as needed. Contact your Surgeon Team if you have pain that persists or worsens after surgery despite rest, ice, elevation, and taking your medication(s) as prescribed. Contact your Surgeon Team if you have new numbness, tingling, or weakness in your operative extremity.     Comfort and Pain Management - Swelling after Surgery   Order Comments: Swelling and/or bruising of the surgical extremity is common and may persist for several months after surgery. In addition to frequent icing and elevation, gentle compressive support with an ACE wrap or tubigrip may help with swelling. Apply compression regularly, removing at least twice daily to perform skin checks. Contact your Surgeon Team if your swelling increases and is NOT associated with an increase in your activity level, or if your swelling increases and is associated with redness and pain.     Comfort and Pain Management - Cold therapy   Order Comments: Ice can be used to control swelling and discomfort after surgery. Place a thin towel over your operative site and apply the ice pack overtop. Leave ice pack in place for 20 minutes, then remove for 20 minutes. Repeat this 20 minutes on/20 minutes off routine as often as tolerated.     Medication Instructions - Acetaminophen (TYLENOL) Instructions   Order  Comments: You were discharged with acetaminophen (TYLENOL) for pain management after surgery. Acetaminophen most effectively manages pain symptoms when it is taken on a schedule without missing doses (every four, six, or eight hours). Your Provider will prescribe a safe daily dose between 3000 - 4000 mg.  Do NOT exceed this daily dose. Most patients use acetaminophen for pain control for the first four weeks after surgery.  You can wean from this medication as your pain decreases.     Medication Instructions - NSAID Instructions   Order Comments: You were discharged with an anti-inflammatory medication for pain management to use in combination with acetaminophen (TYLENOL) and the narcotic pain medication.  Take this medication exactly as directed.  You should only take one anti-inflammatory at a time.  Some common anti-inflammatories include: ibuprofen (ADVIL, MOTRIN), naproxen (ALEVE, NAPROSYN), celecoxib (CELEBREX), meloxicam (MOBIC), ketorolac (TORADOL).  Take this medication with food and water.     Medication Instructions - Opioids - Tapering Instructions   Order Comments: In the first three days following surgery, your symptoms may warrant use of the narcotic pain medication every four to six hours as prescribed. This is normal. As your pain symptoms improve, focus your efforts on decreasing (tapering) use of narcotic medications. The most successful tapering strategy is to first, decrease the number of tablets you take every 4-6 hours to the minimum prescribed. Then, increase the amount of time between doses.  For example:  First, taper to   or 1 tablet every 4-6 hours.  Then, taper to   or 1 tablet every 6-8 hours.  Then, taper to   or 1 tablet every 8-10 hours.  Then, taper to   or 1 tablet every 10-12 hours.  Then, taper to   or 1 tablet at bedtime.  The bedtime dose can help with comfort during sleep and is typically the last dose to be discontinued after surgery.     Follow Up Care   Order Comments:  "Follow-up with your Surgeon Team in 2 for wound check.     Medication instructions -  Anticoagulation - aspirin   Order Comments: Take the aspirin as prescribed for a total of four weeks after surgery.  This is given to help minimize your risk of blood clot.     Comfort and Pain Management - LOWER Extremity Elevation   Order Comments: Swelling is expected for several months after surgery. This type of swelling is usually associated with gravity and activity, and can be improved with elevation.   The best way to do this is to get your \"toes above your nose\" by laying down and placing several pillows lengthwise under your calf and heel. When elevating your leg keep your knee completely straight. Perform this elevation as often as possible especially for the first two weeks after surgery.     Medication Instructions - Opioid Instructions (Less than 65 years)   Order Comments: You were discharged with an opioid medication (hydromorphone, oxycodone, hydrocodone, or tramadol). This medication should only be taken for breakthrough pain that is not controlled with acetaminophen (TYLENOL). If you rate your pain less than 3 you do not need this medication.  Pain rating 0-3:  You do not need this medication.  Pain rating 4-6:  Take 1 tablet every 4-6 hours as needed  Pain rating 7-10:  Take 2 tablets every 4-6 hours as needed.  Do not exceed 6 tablets per day     Dressing / Wound Care - Wound   Order Comments: You have a clean dressing on your surgical wound. Dressing change instructions as follows: remove your dressing in 7 days, and leave incision open to air. Contact your Surgeon Team if you have increased redness, warmth around the surgical wound, and/or drainage from the surgical wound.     Dressing / Wound Care - NO Tub Bathing   Order Comments: Tub bathing, swimming, or any other activities that will cause your incision to be submerged in water should be avoided until allowed by your Surgeon.     Activity   Order " Comments: Okay for Range of motion as tolerated of the operative knee     Order Specific Question Answer Comments   Is discharge order? Yes      NO weight bearing   Order Comments: No weight bearing on your operative extremity.     Order Specific Question Answer Comments   Is discharge order? Yes      Dressing Wound Care - Shower with wound/dressing NOT covered   Order Comments: You do not need to cover your dressing or incision in the shower, you may allow water and soap to run over top of the surgical dressing or incision. You may shower 3 days after surgery.  You are strictly prohibited from soaking or submerging the surgical wound underwater.     Wheelchair Order   Order Comments: Wheelchair Documentation:   Describe the reason for need to support medical necessity: Paraplegia with new leg injury requiring elevated leg rests   1. The patient has mobility limitations that impairs their ability to participate in one or more mobility related activities: Toileting, Bathing, and Transportation.  2. The patient's mobility limitations cannot be safely resolved by using a cane/walker: Yes  3. The patients home has adequate access to use a manual wheelchair: Yes  4. The use of a manual wheelchair on a regular basis will improve the patients ability to participate in mobility related ADL's at home: Yes  5. The patient is willing to use a manual wheelchair at home: Yes  6. The patient has adequate upper body strength and the mental capability to safely use a manual wheelchair and/or has a caregiver that is able to assist: Yes  7. Does the patient have a lower extremity injury or edema?: Yes    Reason for Type of Wheelchair: Light Weight Wheelchair: Patient is unable to self-propel a standard wheelchair in the home but can self propel a light weight wheelchair.    **Use of a manual wheelchair will significantly improve the patient's ability to participate in MRADLs and the patient will use it on a regular basis in the home.  The patient has not expressed an unwillingness to use the manual wheelchair that is provided in the home.**    I, the undersigned, certify that the above prescribed supplies are medically necessary for this patient and is both reasonable and necessary in reference to accepted standards of medical and necessary in reference to accepted standards of medical practice in the treatment of this patient's condition and is not prescribed as a convenience.     Order Specific Question Answer Comments   Wheelchair Type: Standard    Length of Need: 3 Months    Leg Rests: Elevating    Arm Rests: Adjustable    The face to face evaluation was performed on: 10/3/2022      Wheelchair   Order Comments: Wheelchair Documentation:   Describe the reason for need to support medical necessity: paraplegia who presents for evaluation of left knee pain, found to have an incomplete, minimally displaced spiral distal femur fracture. .   1. The patient has mobility limitations that impairs their ability to participate in one or more mobility related activities: Toileting, Feeding, Grooming and Bathing.  2. The patient's mobility limitations cannot be safely resolved by using a cane/walker: Yes. NWB LLE  3. The patients home has adequate access to use a manual wheelchair: Yes. Adequate doorways and hallways for wheelchair access.  4. The use of a manual wheelchair on a regular basis will improve the patients ability to participate in mobility related ADL's at home: Yes. ADL's.  5. The patient is willing to use a manual wheelchair at home: Yes  6. The patient has adequate upper body strength and the mental capability to safely use a manual wheelchair and/or has a caregiver that is able to assist: Yes. Patient has adequate upper body strength to use wheelchair  7. Does the patient have a lower extremity injury or edema?: Yes    Reason for Type of Wheelchair: Light Weight Wheelchair: Patient is unable to self-propel a standard wheelchair in the home  but can self propel a light weight wheelchair.    **Use of a manual wheelchair will significantly improve the patient's ability to participate in MRADLs and the patient will use it on a regular basis in the home. The patient has not expressed an unwillingness to use the manual wheelchair that is provided in the home.**    I, the undersigned, certify that the above prescribed supplies are medically necessary for this patient and is both reasonable and necessary in reference to accepted standards of medical and necessary in reference to accepted standards of medical practice in the treatment of this patient's condition and is not prescribed as a convenience.     Order Specific Question Answer Comments   Wheelchair Type: Lightweight    Length of Need: Lifetime    Leg Rests: Elevating    Arm Rests: Standard    The face to face evaluation was performed on: 10/3/2022      Discharge Instruction - Regular Diet Adult   Order Comments: Return to your pre-surgery diet unless instructed otherwise     Order Specific Question Answer Comments   Is discharge order? Yes            Perry Kimble MD  Orthopaedic Surgery, PGY4

## 2022-10-04 NOTE — PROGRESS NOTES
"Clinic Care Coordination Contact  [facility]: Post-Discharge Note  SITUATION                                                      Admission:    Admission Date: 09/29/22   Reason for Admission: Nondisplaced fracture of the left distal femur on 9/23/22 and presented back to the ED due to continued pain and inability to care for himself  Discharge:   Discharge Date: 10/03/22  Discharge Diagnosis: Closed fracture of distal end of left femur, unspecified fracture morphology, initial encounter (H)    BACKGROUND                                                      Per hospital discharge summary and inpatient provider notes:    DISCHARGE DIAGNOSIS:  Closed fracture of neck of left femur, initial encounter (H) [S72.002A]     PROCEDURES: Procedure(s):  Open reduction internal fixation left distal femur fracture on 9/29/2022 - 9/30/2022     BRIEF HISTORY:  Arnold Duke is a 57 year old male with history of paraplegia who suffered a nondisplaced fracture of the left distal femur on 9/23/22 when he awkwardly twisted his left knee while transferring. He was placed into a knee immobilizer. The patient presented back to the ED due to continued pain and inability to care for himself. Repeat xrays revelaed that the fracture had subsequently displaced. We had a long discussion with the patient about risks and benefits of continued non-operative management vs surgical fixation. After this discussion the patient elected to proceed with surgery documented above     ASSESSMENT      Discharge Assessment  How are you doing now that you are home?: \"I'm okay. Everything is coming together. I'm okay.\"  How are your symptoms? (Red Flag symptoms escalate to triage hotline per guidelines): Improved  Do you feel your condition is stable enough to be safe at home until your provider visit?: Yes  Does the patient have their discharge instructions? : Yes  Does the patient have questions regarding their discharge instructions? : No  Were you " started on any new medications or were there changes to any of your previous medications? : Yes  Does the patient have all of their medications?: Yes  Do you have questions regarding any of your medications? : No  Do you have all of your needed medical supplies or equipment (DME)?  (i.e. oxygen tank, CPAP, cane, etc.): Yes  Discharge follow-up appointment scheduled within 14 calendar days? : Yes  Discharge Follow Up Appointment Date: 10/12/22  Discharge Follow Up Appointment Scheduled with?: Specialty Care Provider (Surgeon Team f/u wound check.)  Is patient agreeable to assistance with scheduling? : Yes    Post-op ([facility] Only)  If the patient had a surgery or procedure, do they have any questions for a nurse?: No      PLAN                                                      Outpatient Plan:     Follow Up Care  Follow-up with your Surgeon Team in 2 for wound check.    Future Appointments   Date Time Provider Department Center   10/12/2022 12:30 PM Mely Singh MD [facility]   12/16/2022 11:30 AM Vivian Silva MD [facility]         For any urgent concerns, please contact our 24 hour nurse triage line: [phone] ([phone])         NIKA Thomas  [phone]  [facility]

## 2022-10-05 LAB — BACTERIA TISS BX CULT: NO GROWTH

## 2022-10-06 NOTE — UTILIZATION REVIEW
Admission Status; Secondary Review Determination    No action to be taken. Please contact me on my Email : [email or web address] if you have any questions.    As part of the [place] Utilization review plan, a self-audit is done on Medicare inpatient admission with less than 2 midnights stay. The 2014 IPPS Final Rule allows outpatient billing in the event that a hospital determines that an inpatient admission was not medically necessary under utilization review process.     (x) Outpatient status would be Appropriate- Short Stay- Post discharge review.    RATIONALE FOR DETERMINATION  Patient presented with a knee fracture, which was treated conservatively, with an immobilizer in place and pain control      Patient was admitted and discharge after one night stay. Record was sent by  for a PA review. Based on the  severity of illness, intensity of service provided, expected LOS and risk for adverse outcome make the care appropriate for further outpatient/observation; however, doesn't meet criteria for hospital inpatient admission.       The information on this document is developed by the utilization review team in order for the business office to ensure compliance.  This only denotes the appropriateness of proper admission status and does not reflect the quality of care rendered.       The definitions of Inpatient Status and Observation Status used in making the determination above are those provided in the CMS Coverage Manual, Chapter 1 and Chapter 6, section 70.4.     Please cont me if you want to discuss further about this admission episode.      Agusto Dc MD, EDP, KG  Medical Director - Utilization management  Staff Hospitalist  [facility]    Pager: [phone]

## 2022-10-07 ENCOUNTER — TELEPHONE (OUTPATIENT)
Dept: ORTHOPEDICS | Facility: CLINIC | Age: 57
End: 2022-10-07

## 2022-10-07 LAB — BACTERIA TISS BX CULT: NORMAL

## 2022-10-07 NOTE — TELEPHONE ENCOUNTER
Patient was called back and a message was left.      Per the operative note he can begin range of motion as tolerated but he cannot bear any weight on that leg until 6 weeks.  He should let pain be his guide at this point for the motion,  He has a follow up with Dr. Singh next Wed 10/12/22 at 12:30p with 12:15 arrival time.  Our number was left to call back for any other questions.

## 2022-10-07 NOTE — TELEPHONE ENCOUNTER
[facility] Call [facility]    Phone Message    May a detailed message be left on voicemail: yes     Reason for Call: Other: Pt would like to know when he can start bending his knee     Action Taken: Other: ortho    Travel Screening: Not Applicable

## 2022-10-11 DIAGNOSIS — S72.8X2A OTHER FRACTURE OF LEFT FEMUR, INITIAL ENCOUNTER FOR CLOSED FRACTURE (H): Primary | ICD-10-CM

## 2022-10-11 NOTE — PROGRESS NOTES
[facility] Call [facility]    Phone Message    May a detailed message be left on voicemail: yes     Reason for Call: Form or Letter   Type or form/letter needing completion:   [employer] manual wheelchair Order Froms    [employer] is calling about a manual wheelchair order form that was faxed to us on 9/30/22 when form is completed Fax to [phone] (which is located on form, any questions or concerns please call Ab thank you   Provider: Vivian Silva MD  Date form needed: asap  Once completed: Fax form to: [phone]      Action Taken: Message routed to:  [facility] pcc    Travel Screening: Not Applicable

## 2022-10-11 NOTE — PROGRESS NOTES
The form is in the provider's folder waiting to be completed.    Princess Martinez on 10/11/2022 at 2:04 PM

## 2022-10-12 ENCOUNTER — OFFICE VISIT (OUTPATIENT)
Dept: ORTHOPEDICS | Facility: CLINIC | Age: 57
End: 2022-10-12
Payer: MEDICARE

## 2022-10-12 ENCOUNTER — ANCILLARY PROCEDURE (OUTPATIENT)
Dept: GENERAL RADIOLOGY | Facility: CLINIC | Age: 57
End: 2022-10-12
Attending: ORTHOPAEDIC SURGERY
Payer: MEDICARE

## 2022-10-12 DIAGNOSIS — S72.8X2A OTHER FRACTURE OF LEFT FEMUR, INITIAL ENCOUNTER FOR CLOSED FRACTURE (H): ICD-10-CM

## 2022-10-12 PROCEDURE — 73552 X-RAY EXAM OF FEMUR 2/>: CPT | Mod: LT | Performed by: RADIOLOGY

## 2022-10-12 PROCEDURE — 99024 POSTOP FOLLOW-UP VISIT: CPT | Performed by: ORTHOPAEDIC SURGERY

## 2022-10-12 NOTE — LETTER
10/12/2022         RE: Arnold Duke  [address]  [facility] [address]        Dear Colleague,    Thank you for referring your patient, Arnold Duke, to the [facility] [place]. Please see a copy of my visit note below.    CHIEF CONCERN: Status post ORIF left distal femur  DATE OF SURGERY: 9/30/22  HISTORY OF PRESENT ILLNESS: Mr. Duke is an extremely pleasant 57 year-old man who is 2 weeks status post the above procedure. He is managing well though does say he sometimes puts pressure on the leg when transferring out of bed.  EXAM:  Pleasant adult male in NAD  Respirations even and unlabored.  Left lower extremity: Incision clean, dry, and intact. Distally neurovascularly intact without deficits. Knee range from 10-80.  IMAGING:   Left femur XRs demonstrate fracture alignment is stable. Hardware unchanged allowing for projection variation.  ASSESSMENT:  1. Two weeks status post above procedure    PLAN:  1. Remain NWB LLE  2. Gentle ROM of knee as tolerated  3. Return in 4 weeks with new XRs    Sincerely,        Mely Singh MD

## 2022-10-12 NOTE — NURSING NOTE
Reason For Visit:   Chief Complaint   Patient presents with     Surgical Followup     2 week pop DOS: 9/30/22 Open Reduction Internal fixation left distal femur       PCP: Vivian Silva      ?  No  Occupation none.  Currently working? No.  Work status?  On disability.  Date of injury: 9/23/22  Type of injury: fell while coming out of the shower.  Date of surgery: 9/30/22  Type of surgery: Open Reduction Internal fixation left distal femur fracture .  Smoker: No  Request smoking cessation information: No        There were no vitals taken for this visit.      Pain Assessment  Patient Currently in Pain: Yes  0-10 Pain Scale: 5  Primary Pain Location: Knee (Left)  Pain Descriptors: Other (comment) (after surgery pain)  Alleviating Factors: Pain medication (oxycodone)  Aggravating Factors: Movement    Clarisa Zavala ATC

## 2022-10-12 NOTE — PROGRESS NOTES
CHIEF CONCERN: Status post ORIF left distal femur  DATE OF SURGERY: 9/30/22    HISTORY OF PRESENT ILLNESS: Mr. Duke is an extremely pleasant 57 year-old man who is 2 weeks status post the above procedure. He is managing well though does say he sometimes puts pressure on the leg when transferring out of bed.    EXAM:  Pleasant adult male in NAD  Respirations even and unlabored.  Left lower extremity: Incision clean, dry, and intact. Distally neurovascularly intact without deficits. Knee range from 10-80.    IMAGING:   Left femur XRs demonstrate fracture alignment is stable. Hardware unchanged allowing for projection variation.    ASSESSMENT:  1. Two weeks status post above procedure    PLAN:  1. Remain NWB LLE  2. Gentle ROM of knee as tolerated  3. Return in 4 weeks with new XRs

## 2022-10-13 ENCOUNTER — MEDICAL CORRESPONDENCE (OUTPATIENT)
Dept: HEALTH INFORMATION MANAGEMENT | Facility: CLINIC | Age: 57
End: 2022-10-13

## 2022-10-28 LAB — BACTERIA TISS BX CULT: NO GROWTH

## 2022-11-01 ENCOUNTER — TELEPHONE (OUTPATIENT)
Dept: ORTHOPEDICS | Facility: CLINIC | Age: 57
End: 2022-11-01

## 2022-11-01 NOTE — TELEPHONE ENCOUNTER
Patient was called back.  Per Dr. Singh's last note she wants him to remain non WB until he is seen and has another set of xrays to evaluate healing.  He was agreeable with this plan and thankful for the call.

## 2022-11-01 NOTE — TELEPHONE ENCOUNTER
[facility] Call [facility]    Phone Message    May a detailed message be left on voicemail: yes     Reason for Call: Other: Does have follow up until 11/16 and he is wondering when you can  start using his standing frame again. Please call at [phone]     Action Taken: Other: [facility]    Travel Screening: Not Applicable

## 2022-11-07 DIAGNOSIS — S72.8X2A OTHER FRACTURE OF LEFT FEMUR, INITIAL ENCOUNTER FOR CLOSED FRACTURE (H): Primary | ICD-10-CM

## 2022-11-16 ENCOUNTER — ANCILLARY PROCEDURE (OUTPATIENT)
Dept: GENERAL RADIOLOGY | Facility: CLINIC | Age: 57
End: 2022-11-16
Attending: ORTHOPAEDIC SURGERY
Payer: MEDICARE

## 2022-11-16 ENCOUNTER — OFFICE VISIT (OUTPATIENT)
Dept: ORTHOPEDICS | Facility: CLINIC | Age: 57
End: 2022-11-16
Payer: MEDICARE

## 2022-11-16 DIAGNOSIS — S72.8X2A OTHER FRACTURE OF LEFT FEMUR, INITIAL ENCOUNTER FOR CLOSED FRACTURE (H): ICD-10-CM

## 2022-11-16 DIAGNOSIS — S72.8X2A OTHER FRACTURE OF LEFT FEMUR, INITIAL ENCOUNTER FOR CLOSED FRACTURE (H): Primary | ICD-10-CM

## 2022-11-16 PROCEDURE — 99024 POSTOP FOLLOW-UP VISIT: CPT | Performed by: ORTHOPAEDIC SURGERY

## 2022-11-16 PROCEDURE — 73552 X-RAY EXAM OF FEMUR 2/>: CPT | Mod: LT | Performed by: RADIOLOGY

## 2022-11-16 NOTE — PROGRESS NOTES
CHIEF CONCERN: Status post ORIF left distal femur  DATE OF SURGERY: 9/30/22     HISTORY OF PRESENT ILLNESS: Mr. Duke is an extremely pleasant 57 year-old man who is 6.5 weeks status post the above procedure. He is managing well though states he has some episodes of intermittent ache in his right thigh. This has improved since surgery and the patient is taking tylenol once daily with mild improvement.     EXAM:  Pleasant adult male in NAD  Respirations even and unlabored.  Left lower extremity: Incisions appropriately healed. Passive knee range from 0-90.     IMAGING:   Radiographs   Left femur XRs demonstrate fracture alignment is stable. Hardware unchanged allowing for projection variation. Evidence of bony healing, particularly along medial border of femur on AP view.     ASSESSMENT:  1. 6.5 weeks status post above procedure     PLAN:  1. Remain NWB LLE until follow up in 6 weeks  2. Gentle ROM of knee as tolerated  3. Return in 6 weeks with new XRs, had discussion with patient that he likely won't be fully weightbearing until 3 mo follow up appt  4. Daily multivitamin with Vit D and Ca     Encouraged to call in with any questions or concerns.    Vidhya Claros, PGY1   Orthopaedic Surgery    Mely Singh MD    I have personally examined this patient and have reviewed the clinical presentation and progress note with the resident.  I agree with the treatment plan as outlined.  The plan was formulated with the resident on the day of the resident's note.

## 2022-11-16 NOTE — NURSING NOTE
Reason For Visit:   Chief Complaint   Patient presents with     RECHECK     5 week clinic f/u (not here at 4 wks), need to order XR. DOS: 9/30/22 Open Reduction Internal fixation left distal femur fracture with XR      PCP: Vivian Silva        ?  No  Occupation none.  Currently working? No.  Work status?  On disability.  Date of injury: 9/23/22  Type of injury: fell while coming out of the shower.  Date of surgery: 9/30/22  Type of surgery: Open Reduction Internal fixation left distal femur fracture .  Smoker: No  Request smoking cessation information: No          There were no vitals taken for this visit.      Pain Assessment  Patient Currently in Pain: Yes  Patient's Stated Pain Goal: 6    Earnestine Mcconnell

## 2022-11-16 NOTE — LETTER
11/16/2022         RE: Arnold Duke  [address]  [facility] [address]        Dear Colleague,    Thank you for referring your patient, Arnold Duke, to the [facility] [place]. Please see a copy of my visit note below.    CHIEF CONCERN: Status post ORIF left distal femur  DATE OF SURGERY: 9/30/22     HISTORY OF PRESENT ILLNESS: Mr. Duke is an extremely pleasant 57 year-old man who is 6.5 weeks status post the above procedure. He is managing well though states he has some episodes of intermittent ache in his right thigh. This has improved since surgery and the patient is taking tylenol once daily with mild improvement.     EXAM:  Pleasant adult male in NAD  Respirations even and unlabored.  Left lower extremity: Incisions appropriately healed. Passive knee range from 0-90.     IMAGING:   Radiographs   Left femur XRs demonstrate fracture alignment is stable. Hardware unchanged allowing for projection variation. Evidence of bony healing, particularly along medial border of femur on AP view.     ASSESSMENT:  1. 6.5 weeks status post above procedure     PLAN:  1. Remain NWB LLE until follow up in 6 weeks  2. Gentle ROM of knee as tolerated  3. Return in 6 weeks with new XRs, had discussion with patient that he likely won't be fully weightbearing until 3 mo follow up appt  4. Daily multivitamin with Vit D and Ca     Encouraged to call in with any questions or concerns.    Vidhya Claros, PGY1   Orthopaedic Surgery    Mely Singh MD    I have personally examined this patient and have reviewed the clinical presentation and progress note with the resident.  I agree with the treatment plan as outlined.  The plan was formulated with the resident on the day of the resident's note.

## 2022-12-21 DIAGNOSIS — S72.8X2A OTHER FRACTURE OF LEFT FEMUR, INITIAL ENCOUNTER FOR CLOSED FRACTURE (H): Primary | ICD-10-CM

## 2022-12-28 ENCOUNTER — ANCILLARY PROCEDURE (OUTPATIENT)
Dept: GENERAL RADIOLOGY | Facility: CLINIC | Age: 57
End: 2022-12-28
Attending: ORTHOPAEDIC SURGERY
Payer: MEDICARE

## 2022-12-28 ENCOUNTER — OFFICE VISIT (OUTPATIENT)
Dept: ORTHOPEDICS | Facility: CLINIC | Age: 57
End: 2022-12-28
Payer: MEDICARE

## 2022-12-28 DIAGNOSIS — M80.852D: Primary | ICD-10-CM

## 2022-12-28 DIAGNOSIS — S72.8X2A OTHER FRACTURE OF LEFT FEMUR, INITIAL ENCOUNTER FOR CLOSED FRACTURE (H): ICD-10-CM

## 2022-12-28 PROCEDURE — 99024 POSTOP FOLLOW-UP VISIT: CPT | Performed by: ORTHOPAEDIC SURGERY

## 2022-12-28 PROCEDURE — 73552 X-RAY EXAM OF FEMUR 2/>: CPT | Mod: LT | Performed by: RADIOLOGY

## 2022-12-28 NOTE — NURSING NOTE
"Reason For Visit:   Chief Complaint   Patient presents with     Surgical Followup     3 month pop DOS: 9/30/22 Open Reduction Internal fixation left distal femur fracture       PCP: Vivian Silva        ?  No  Occupation none.  Currently working? No.  Work status?  On disability.  Date of injury: 9/23/22  Type of injury: fell while coming out of the shower.  Date of surgery: 9/30/22  Type of surgery: Open Reduction Internal fixation left distal femur fracture .  Smoker: No  Request smoking cessation information: No    There were no vitals taken for this visit.      Pain Assessment  Patient Currently in Pain: Yes  0-10 Pain Scale: 5 (\"Normal pain\")  Primary Pain Location: Leg (Left)  Pain Descriptors: Other (comment) (piercing)    Clarisa Zavala ATC        "

## 2022-12-28 NOTE — LETTER
12/28/2022         RE: Arnold Duke  [address]  [facility] [address]        Dear Colleague,    Thank you for referring your patient, Arnold Duke, to the [facility] [place]. Please see a copy of my visit note below.    CHIEF CONCERN: Status post ORIF left distal femur  DATE OF SURGERY: 9/30/22     HISTORY OF PRESENT ILLNESS: Mr. Duke is an extremely pleasant 57 year-old man who is three months status post the above procedure. He reports he has had no sense of any painful sensation (which he can feel if it were present). He would like to progress weight bearing to be in his stander for some period of time.      EXAM:  Pleasant adult male in NAD  Respirations even and unlabored.  Left lower extremity: Passive knee range from 0-90. No pain with ROM.     IMAGING:   Radiographs   Left femur XRs demonstrate fracture line remains visible distally with progression of callous proximally.     ASSESSMENT:  1. Three months status post above procedure     PLAN:  I discussed with patient that he can slowly progress weight bearing to be in his stander and that he should do this for a small period of time. If tolerates well may progress time he is in his stander.   He will return in 2-3 months or prn.       Mely Singh MD

## 2022-12-28 NOTE — PROGRESS NOTES
CHIEF CONCERN: Status post ORIF left distal femur  DATE OF SURGERY: 9/30/22     HISTORY OF PRESENT ILLNESS: Mr. Duke is an extremely pleasant 57 year-old man who is three months status post the above procedure. He reports he has had no sense of any painful sensation (which he can feel if it were present). He would like to progress weight bearing to be in his stander for some period of time.      EXAM:  Pleasant adult male in NAD  Respirations even and unlabored.  Left lower extremity: Passive knee range from 0-90. No pain with ROM.     IMAGING:   Radiographs   Left femur XRs demonstrate fracture line remains visible distally with progression of callous proximally.     ASSESSMENT:  1. Three months status post above procedure     PLAN:  I discussed with patient that he can slowly progress weight bearing to be in his stander and that he should do this for a small period of time. If tolerates well may progress time he is in his stander.   He will return in 2-3 months or prn.

## 2023-01-18 ENCOUNTER — TELEPHONE (OUTPATIENT)
Dept: INTERNAL MEDICINE | Facility: CLINIC | Age: 58
End: 2023-01-18
Payer: MEDICARE

## 2023-01-18 NOTE — TELEPHONE ENCOUNTER
The form has been printed and placed in the provider's folder to be completed.    Princess Martinez on 1/18/2023 at 2:29 PM

## 2023-01-18 NOTE — TELEPHONE ENCOUNTER
[facility] Call [facility]    Phone Message    May a detailed message be left on voicemail: yes     Reason for Call: Form or Letter   Type or form/letter needing completion:   APPLICATION FOR (Permanent) DISABILITY PARKING CERTIFICATE    The patient is calling to request a permanent Disability Parking sticker/tag Forms to be send to the [employer] ASAP, his current parking pass  in 2022, please call patient to address any questions or concerns thank you.      Provider: Vivian Silva MD  Date form needed: asap  Once completed: Fax form to: pcc      Action Taken: Message routed to:  [facility]    Travel Screening: Not Applicable

## 2023-01-23 ENCOUNTER — TELEPHONE (OUTPATIENT)
Dept: INTERNAL MEDICINE | Facility: CLINIC | Age: 58
End: 2023-01-23
Payer: MEDICARE

## 2023-01-23 ENCOUNTER — DOCUMENTATION ONLY (OUTPATIENT)
Dept: INTERNAL MEDICINE | Facility: CLINIC | Age: 58
End: 2023-01-23
Payer: MEDICARE

## 2023-01-23 NOTE — TELEPHONE ENCOUNTER
[facility] Call [facility]    Phone Message    May a detailed message be left on voicemail: yes     Reason for Call: Other: Patient is checking the status about the forms he sent in for a parking renewal please call him back as soon as possible so he knows when he can bring it in     Action Taken: Message routed to:  [facility] pcp    Travel Screening: Not Applicable

## 2023-01-23 NOTE — PROGRESS NOTES
Type of Form Received: Disability parking     Form Received (Date) 1/18/23   Form Filled out Yes, date 1/23/23   Placed in provider folder Yes

## 2023-01-23 NOTE — TELEPHONE ENCOUNTER
I left a voicemail for the patient stating the form has been completed by the provider and mailed back to the patient to complete his part of the form and mail it in to the State of [place].    Princess Martinez on 1/23/2023 at 2:57 PM

## 2023-02-15 ENCOUNTER — OFFICE VISIT (OUTPATIENT)
Dept: INTERNAL MEDICINE | Facility: CLINIC | Age: 58
End: 2023-02-15
Payer: MEDICARE

## 2023-02-15 ENCOUNTER — ANCILLARY PROCEDURE (OUTPATIENT)
Dept: GENERAL RADIOLOGY | Facility: CLINIC | Age: 58
End: 2023-02-15
Attending: ORTHOPAEDIC SURGERY
Payer: MEDICARE

## 2023-02-15 ENCOUNTER — LAB (OUTPATIENT)
Dept: LAB | Facility: CLINIC | Age: 58
End: 2023-02-15
Payer: MEDICARE

## 2023-02-15 ENCOUNTER — OFFICE VISIT (OUTPATIENT)
Dept: ORTHOPEDICS | Facility: CLINIC | Age: 58
End: 2023-02-15
Payer: MEDICARE

## 2023-02-15 VITALS
HEART RATE: 65 BPM | BODY MASS INDEX: 25.84 KG/M2 | SYSTOLIC BLOOD PRESSURE: 131 MMHG | WEIGHT: 175 LBS | OXYGEN SATURATION: 100 % | DIASTOLIC BLOOD PRESSURE: 75 MMHG

## 2023-02-15 DIAGNOSIS — M81.8 OSTEOPOROSIS, IDIOPATHIC: ICD-10-CM

## 2023-02-15 DIAGNOSIS — M80.852D: ICD-10-CM

## 2023-02-15 DIAGNOSIS — T14.8XXA FRACTURE: ICD-10-CM

## 2023-02-15 DIAGNOSIS — Z78.9 DIFFICULTY MANAGING URINARY CATHETER: ICD-10-CM

## 2023-02-15 DIAGNOSIS — E78.5 HYPERLIPIDEMIA, UNSPECIFIED HYPERLIPIDEMIA TYPE: ICD-10-CM

## 2023-02-15 DIAGNOSIS — R82.90 FOUL SMELLING URINE: ICD-10-CM

## 2023-02-15 DIAGNOSIS — Z12.5 ENCOUNTER FOR SCREENING FOR MALIGNANT NEOPLASM OF PROSTATE: ICD-10-CM

## 2023-02-15 DIAGNOSIS — M80.852D: Primary | ICD-10-CM

## 2023-02-15 DIAGNOSIS — R82.90 FOUL SMELLING URINE: Primary | ICD-10-CM

## 2023-02-15 LAB
CHOLEST SERPL-MCNC: 209 MG/DL
HDLC SERPL-MCNC: 52 MG/DL
LDLC SERPL CALC-MCNC: 146 MG/DL
NONHDLC SERPL-MCNC: 157 MG/DL
PSA SERPL-MCNC: 6.02 NG/ML (ref 0–3.5)
TRIGL SERPL-MCNC: 56 MG/DL

## 2023-02-15 PROCEDURE — 36415 COLL VENOUS BLD VENIPUNCTURE: CPT | Performed by: PATHOLOGY

## 2023-02-15 PROCEDURE — 73552 X-RAY EXAM OF FEMUR 2/>: CPT | Mod: LT | Performed by: RADIOLOGY

## 2023-02-15 PROCEDURE — G0103 PSA SCREENING: HCPCS | Performed by: PATHOLOGY

## 2023-02-15 PROCEDURE — 80061 LIPID PANEL: CPT | Performed by: PATHOLOGY

## 2023-02-15 PROCEDURE — 99214 OFFICE O/P EST MOD 30 MIN: CPT | Performed by: INTERNAL MEDICINE

## 2023-02-15 PROCEDURE — 99213 OFFICE O/P EST LOW 20 MIN: CPT | Mod: GC | Performed by: ORTHOPAEDIC SURGERY

## 2023-02-15 NOTE — PROGRESS NOTES
CHIEF CONCERN:  Status post ORIF left distal femur fracture on 9/30/22    HISTORY OF PRESENT ILLNESS: This is a 58-year-old male who is presenting about 5-month status post ORIF of a left distal femur fracture.  He is feels that he is continuing to progress appropriately and states that he experiences minimal pain on a daily basis now.  He does not take any daily medications for pain.  He has no particular questions or concerns for today's visit.    PHYSICAL EXAM:    General: Awake, alert, appropriately interactive  Lungs: Nonlabored breathing  Cardiovascular: Regular rate, no peripheral cyanosis  Focused exam of the left lower extremity: Nontender about the left knee or distal left thigh.  Incision is well-healed.  He has painless passive range of motion of the left knee from about 0 to 120 degrees of flexion.  Sensation is at baseline throughout the left lower extremity.    IMAGING:  X-ray of the left femur redemonstrates prior hardware.  The instrumentation appears to be stable compared to prior imaging studies.  There is interval increase in callus formation about the left distal femoral fracture.  Alignment of the left femur appears stable as compared to prior.    ASSESSMENT:  58-year-old male who is about 5 months status post ORIF of a left distal femur fracture who is continuing to heal appropriately.  We discussed with the patient that he does not have any specific restrictions at this point as his fracture appears to be stable and healing well.    PLAN:  -Weightbearing and range of motion as tolerated left lower extremity  -Follow-up in about 3 months with repeat imaging.    Sandoval Bell MD  Orthopedic Surgery, PGY-1    I have personally examined this patient and have reviewed the clinical presentation and progress note with the resident.  I agree with the treatment plan as outlined.  The plan was formulated with the resident on the day of the resident's note.       Mely Singh MD

## 2023-02-15 NOTE — NURSING NOTE
Reason For Visit:   Chief Complaint   Patient presents with     Surgical Followup     5 month post op DOS: 9/30/22 Open Reduction Internal fixation left distal femur fracture        PCP: Vivian Silva        ?  No  Occupation none.  Currently working? No.  Work status?  On disability.  Date of injury: 9/23/22  Type of injury: fell while coming out of the shower.  Date of surgery: 9/30/22  Type of surgery: Open Reduction Internal fixation left distal femur fracture .  Smoker: No  Request smoking cessation information: No    There were no vitals taken for this visit.      Pain Assessment  Patient Currently in Pain: Yes  0-10 Pain Scale: 5    Clarisa Zavala, ATC

## 2023-02-15 NOTE — LETTER
2/15/2023         RE: Arnold Duke  [address]  [facility] [address]        Dear Colleague,    Thank you for referring your patient, Arnold Duke, to the [facility] [place]. Please see a copy of my visit note below.    CHIEF CONCERN:  Status post ORIF left distal femur fracture on 9/30/22    HISTORY OF PRESENT ILLNESS: This is a 58-year-old male who is presenting about 5-month status post ORIF of a left distal femur fracture.  He is feels that he is continuing to progress appropriately and states that he experiences minimal pain on a daily basis now.  He does not take any daily medications for pain.  He has no particular questions or concerns for today's visit.    PHYSICAL EXAM:    General: Awake, alert, appropriately interactive  Lungs: Nonlabored breathing  Cardiovascular: Regular rate, no peripheral cyanosis  Focused exam of the left lower extremity: Nontender about the left knee or distal left thigh.  Incision is well-healed.  He has painless passive range of motion of the left knee from about 0 to 120 degrees of flexion.  Sensation is at baseline throughout the left lower extremity.    IMAGING:  X-ray of the left femur redemonstrates prior hardware.  The instrumentation appears to be stable compared to prior imaging studies.  There is interval increase in callus formation about the left distal femoral fracture.  Alignment of the left femur appears stable as compared to prior.    ASSESSMENT:  58-year-old male who is about 5 months status post ORIF of a left distal femur fracture who is continuing to heal appropriately.  We discussed with the patient that he does not have any specific restrictions at this point as his fracture appears to be stable and healing well.    PLAN:  -Weightbearing and range of motion as tolerated left lower extremity  -Follow-up in about 3 months with repeat imaging.    Sandoval Bell MD  Orthopedic Surgery, PGY-1    I have personally examined  this patient and have reviewed the clinical presentation and progress note with the resident.  I agree with the treatment plan as outlined.  The plan was formulated with the resident on the day of the resident's note.       Mely Singh MD

## 2023-02-15 NOTE — NURSING NOTE
Arnold Duke is a 58 year old male that presents in clinic today for the following:     Chief Complaint   Patient presents with     Follow Up     Pt here for follow up       The patient's allergies and medications were reviewed. The patient's vitals were obtained without incident. The patient does not have any other questions for the provider.     Santana Blackwell, EMT at 2:41 PM on 2/15/2023.  Primary Care Clinic: [phone]

## 2023-02-23 NOTE — PROGRESS NOTES
Arnold is a very pleasant 58 year old male who was seen today for follow up.  We discussed a few issues:    1.  Recent femur fracture with minimal provocation; will screen for osteoporosis via DEXA scan  2.  Urinary symptoms: a foul smell to his urine, also difficulty passing a catheter.  Discussed possibility of UTI, BPH.  Will check UA and PSA and refer to Urology for recommendations.  3.  Continues on his PT regimen to maintain upper body strength.   4.  I completed a disability parking form for him in the recent past, and today another form was completed during the visit.    On exam,  /75 (BP Location: Left arm, Patient Position: Sitting, Cuff Size: Adult Regular)   Pulse 65   Wt 79.4 kg (175 lb)   SpO2 100%   BMI 25.84 kg/m     Alert, NAD  Cor RRR  Lungs CTA    A/P 58 year old here for follow up    Foul smelling urine  -     UA Macro with Reflex to Micro and Culture - lab collect; Future    Difficulty managing urinary catheter  -     PSA, screen; Future  -     Adult Urology  Referral; Future    Encounter for screening for malignant neoplasm of prostate  -     PSA, screen; Future    Fracture  -     DX Hip/Pelvis/Spine; Future    Osteoporosis, idiopathic  -     DX Hip/Pelvis/Spine; Future    Vivian Avalos MD

## 2023-04-05 ENCOUNTER — VIRTUAL VISIT (OUTPATIENT)
Dept: UROLOGY | Facility: CLINIC | Age: 58
End: 2023-04-05
Attending: INTERNAL MEDICINE
Payer: MEDICARE

## 2023-04-05 VITALS — WEIGHT: 175 LBS | HEIGHT: 69 IN | BODY MASS INDEX: 25.92 KG/M2

## 2023-04-05 DIAGNOSIS — Z87.440 HISTORY OF UTI: ICD-10-CM

## 2023-04-05 DIAGNOSIS — R97.20 ELEVATED PSA: ICD-10-CM

## 2023-04-05 DIAGNOSIS — Z78.9 DIFFICULTY MANAGING URINARY CATHETER: ICD-10-CM

## 2023-04-05 DIAGNOSIS — G82.20 PARAPLEGIA (H): Primary | ICD-10-CM

## 2023-04-05 DIAGNOSIS — G83.4 CAUDA EQUINA SYNDROME (H): ICD-10-CM

## 2023-04-05 PROCEDURE — 99204 OFFICE O/P NEW MOD 45 MIN: CPT | Mod: VID | Performed by: PHYSICIAN ASSISTANT

## 2023-04-05 RX ORDER — MULTIVIT WITH MINERALS/LUTEIN
1000 TABLET ORAL 2 TIMES DAILY
Qty: 180 TABLET | Refills: 1 | Status: SHIPPED | OUTPATIENT
Start: 2023-04-05

## 2023-04-05 RX ORDER — MULTIVIT WITH MINERALS/LUTEIN
1000 TABLET ORAL 2 TIMES DAILY
Qty: 180 TABLET | Refills: 1 | Status: SHIPPED | OUTPATIENT
Start: 2023-04-05 | End: 2023-04-05

## 2023-04-05 ASSESSMENT — PAIN SCALES - GENERAL: PAINLEVEL: MODERATE PAIN (5)

## 2023-04-05 NOTE — LETTER
"2023       RE: Arnold Duke  [address]  [facility] [address]     Dear Colleague,    Thank you for referring your patient, Arnold Duke, to the [facility] at [facility]. Please see a copy of my visit note below.    Send link to cell phone    Arnold is a 58 year old who is being evaluated via a billable video visit.      How would you like to obtain your AVS? MyChart  If the video visit is dropped, the invitation should be resent by: Text to cell phone: [phone]  Will anyone else be joining your video visit? No        Video-Visit Details    Type of service:  Video Visit   Video Start Time: 10:37 AM  Video End Time:10:55 AM    Originating Location (pt. Location): Home    Distant Location (provider location):  On-site  Platform used for Video Visit: Doximity    CC: Hx UTI, elevated PSA    HPI:  Arnold Duke is a very pleasant 58 year old male who presents in consultation from Dr. Jay Avalos for evaluation of the above.  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 He developed hydrocephalus requiring a  shunt and cauda equina syndrome causing paraplegia. He has neurogenic bladder and bowel and has a G tube and performs CIC. . He has had osteomyelitis due to pressure ulcers.     Uses CIC x 3-5 day. Has leakage between catheterizations (does not void on demand).   PVRs had been  ~300cc, although he is having more difficulty passing cath (\"running into something\") and volumes are larger because of this problem (500-700cc; 14Fr coude).    [facility] Has had iced tea colored urine since (intermittently). Urine can be very foul smelling. No pain with passing cath.    PSA 6.02 2/15/23 (6.4 in ). Most recent Cr 0.69.     Uncle had prostate cancer and  from it.       Past Medical History:   Diagnosis Date    Hydrocephalus (H)        Past Surgical History: " "  Procedure Laterality Date    BIOPSY      CYSTOSCOPY      GASTROSTOMY, INSERT TUBE, COMBINED      IMPLANT SHUNT VENTRICULOPERITONEAL      due to hydrocephalus from meningitis    OPEN REDUCTION INTERNAL FIXATION FEMUR MIDSHAFT Left 09/30/2022    Procedure: Open reduction internal fixation left distal femur fracture;  Surgeon: Mely Singh MD;  Location:  OR    PICC  11/21/2014            Social History     Socioeconomic History    Marital status:      Spouse name: Not on file    Number of children: Not on file    Years of education: Not on file    Highest education level: Not on file   Occupational History    Not on file   Tobacco Use    Smoking status: Former    Smokeless tobacco: Never   Vaping Use    Vaping status: Not on file   Substance and Sexual Activity    Alcohol use: Not on file    Drug use: Not on file    Sexual activity: Not on file   Other Topics Concern    Not on file   Social History Narrative    Social History:    4 children, 3 grandkids,     Formally employed as excecutive admin support for [employer]    No pork or red meat    Served in the [employer], 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     Social Determinants of Health     Financial Resource Strain: Not on file   Food Insecurity: Not on file   Transportation Needs: Not on file   Physical Activity: Not on file   Stress: Not on file   Social Connections: Not on file   Intimate Partner Violence: Not on file   Housing Stability: Not on file       No family history on file.    No Known Allergies    Current Outpatient Medications   Medication    acetaminophen (TYLENOL) 325 MG tablet    ibuprofen (ADVIL/MOTRIN) 600 MG tablet    Lubricants (LUBRICATING JELLY EX)    VITAMIN D, CHOLECALCIFEROL, PO    aspirin 81 MG EC tablet     No current facility-administered medications for this visit.         PEx:   Height 1.753 m (5' 9\"), weight 79.4 kg (175 lb).    PSYCH: NAD  EYES: EOMI  MOUTH: MMM  NEURO: AAO x3    Urine:      A/P: Arnold Duke is a 58 year old " male with neurogenic bladder managed with CIC, elevated PSA, hx of UTIs  -Renal US  -T3 MRI of the prostate  -Vitamin C 1mg BID to acidify the urine.  -See nurse for other CIC cath options. Goal for PVR to be 300cc or less.     MADELINE Garza-[facility]        40 minutes spent on the date of the encounter doing chart review, review of outside records, review of test results, interpretation of tests, patient visit and documentation

## 2023-04-05 NOTE — PROGRESS NOTES
"Send link to cell phone    Arnold is a 58 year old who is being evaluated via a billable video visit.      How would you like to obtain your AVS? ScreachTV  If the video visit is dropped, the invitation should be resent by: Text to cell phone: [phone]  Will anyone else be joining your video visit? No        Video-Visit Details    Type of service:  Video Visit   Video Start Time: 10:37 AM  Video End Time:10:55 AM    Originating Location (pt. Location): Home    Distant Location (provider location):  On-site  Platform used for Video Visit: uShip    CC: Hx UTI, elevated PSA    HPI:  Arnold Duke is a very pleasant 58 year old male who presents in consultation from Dr. Jay Avalos for evaluation of the above.  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 He developed hydrocephalus requiring a  shunt and cauda equina syndrome causing paraplegia. He has neurogenic bladder and bowel and has a G tube and performs CIC. . He has had osteomyelitis due to pressure ulcers.     Uses CIC x 3-5 day. Has leakage between catheterizations (does not void on demand).   PVRs had been  ~300cc, although he is having more difficulty passing cath (\"running into something\") and volumes are larger because of this problem (500-700cc; 14Fr coude).    23  [facility]. Has had iced tea colored urine since (intermittently). Urine can be very foul smelling. No pain with passing cath.    PSA 6.02 2/15/23 (6.4 in ). Most recent Cr 0.69.     Uncle had prostate cancer and  from it.       Past Medical History:   Diagnosis Date     Hydrocephalus (H)        Past Surgical History:   Procedure Laterality Date     BIOPSY       CYSTOSCOPY       GASTROSTOMY, INSERT TUBE, COMBINED       IMPLANT SHUNT VENTRICULOPERITONEAL      due to hydrocephalus from meningitis     OPEN REDUCTION INTERNAL FIXATION FEMUR MIDSHAFT Left 2022    Procedure: Open reduction internal fixation left distal femur " "fracture;  Surgeon: Mely Singh MD;  Location:  OR     [facility]  11/21/2014            Social History     Socioeconomic History     Marital status:      Spouse name: Not on file     Number of children: Not on file     Years of education: Not on file     Highest education level: Not on file   Occupational History     Not on file   Tobacco Use     Smoking status: Former     Smokeless tobacco: Never   Vaping Use     Vaping status: Not on file   Substance and Sexual Activity     Alcohol use: Not on file     Drug use: Not on file     Sexual activity: Not on file   Other Topics Concern     Not on file   Social History Narrative    Social History:    4 children, 3 grandkids,     Formally employed as excecutive admin support for [employer]    No pork or red meat    Served in the [employer], 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     Social Determinants of Health     Financial Resource Strain: Not on file   Food Insecurity: Not on file   Transportation Needs: Not on file   Physical Activity: Not on file   Stress: Not on file   Social Connections: Not on file   Intimate Partner Violence: Not on file   Housing Stability: Not on file       No family history on file.    No Known Allergies    Current Outpatient Medications   Medication     acetaminophen (TYLENOL) 325 MG tablet     ibuprofen (ADVIL/MOTRIN) 600 MG tablet     Lubricants (LUBRICATING JELLY EX)     VITAMIN D, CHOLECALCIFEROL, PO     aspirin 81 MG EC tablet     No current facility-administered medications for this visit.         PEx:   Height 1.753 m (5' 9\"), weight 79.4 kg (175 lb).    PSYCH: NAD  EYES: EOMI  MOUTH: MMM  NEURO: AAO x3    Urine:      A/P: Arnold Duke is a 58 year old male with neurogenic bladder managed with CIC, elevated PSA, hx of UTIs  -Renal US  -T3 MRI of the prostate  -Vitamin C 1mg BID to acidify the urine.  -See nurse for other CIC cath options. Goal for PVR to be 300cc or less.     ALISTAIR Garza [facility]        40 minutes spent " on the date of the encounter doing chart review, review of outside records, review of test results, interpretation of tests, patient visit and documentation

## 2023-04-05 NOTE — PATIENT INSTRUCTIONS
Vitamin C 1,000mg twice a day.    Nurse visit to review catheter options. I would prefer your cath volumes to be consistently 300cc or less.     MRI of the prostate    Kidney ultrasound to ensure you are cathing frequent enough.

## 2023-04-06 ENCOUNTER — ALLIED HEALTH/NURSE VISIT (OUTPATIENT)
Dept: UROLOGY | Facility: CLINIC | Age: 58
End: 2023-04-06
Payer: MEDICARE

## 2023-04-06 DIAGNOSIS — R97.20 ELEVATED PROSTATE SPECIFIC ANTIGEN (PSA): ICD-10-CM

## 2023-04-06 DIAGNOSIS — Z87.440 HISTORY OF UTI: Primary | ICD-10-CM

## 2023-04-06 DIAGNOSIS — R39.89 SUSPECTED UTI: ICD-10-CM

## 2023-04-06 DIAGNOSIS — R97.20 ELEVATED PROSTATE SPECIFIC ANTIGEN (PSA): Primary | ICD-10-CM

## 2023-04-06 LAB
ALBUMIN UR-MCNC: NEGATIVE MG/DL
APPEARANCE UR: CLEAR
BILIRUB UR QL STRIP: NEGATIVE
COLOR UR AUTO: YELLOW
GLUCOSE UR STRIP-MCNC: NEGATIVE MG/DL
HGB UR QL STRIP: ABNORMAL
KETONES UR STRIP-MCNC: NEGATIVE MG/DL
LEUKOCYTE ESTERASE UR QL STRIP: ABNORMAL
NITRATE UR QL: NEGATIVE
PH UR STRIP: 7.5 [PH] (ref 5–7)
PSA SERPL-MCNC: 7.8 UG/L (ref 0–4)
SP GR UR STRIP: 1.02 (ref 1–1.03)
UROBILINOGEN UR STRIP-ACNC: 0.2 E.U./DL

## 2023-04-06 PROCEDURE — 87186 SC STD MICRODIL/AGAR DIL: CPT

## 2023-04-06 PROCEDURE — 87088 URINE BACTERIA CULTURE: CPT

## 2023-04-06 PROCEDURE — 81003 URINALYSIS AUTO W/O SCOPE: CPT | Mod: QW

## 2023-04-06 PROCEDURE — 87086 URINE CULTURE/COLONY COUNT: CPT

## 2023-04-06 PROCEDURE — 36415 COLL VENOUS BLD VENIPUNCTURE: CPT

## 2023-04-06 PROCEDURE — 84153 ASSAY OF PSA TOTAL: CPT

## 2023-04-06 NOTE — PROGRESS NOTES
Arnold DONATO Srikanth comes into clinic today at the request of Linda Petty PAC Ordering Provider for catheter samples.  This service provided today was under the supervising provider of the day , who was available if needed.  Patient several different coude 16 fr/18 samples,he will try the different samples and get back to us which one he likes.  Noa Faulkner LPN

## 2023-04-07 LAB — BACTERIA UR CULT: ABNORMAL

## 2023-04-10 DIAGNOSIS — N39.0 URINARY TRACT INFECTION: Primary | ICD-10-CM

## 2023-04-10 RX ORDER — SULFAMETHOXAZOLE/TRIMETHOPRIM 800-160 MG
1 TABLET ORAL 2 TIMES DAILY
Qty: 14 TABLET | Refills: 0 | Status: SHIPPED | OUTPATIENT
Start: 2023-04-10 | End: 2023-04-17

## 2023-04-11 NOTE — PROGRESS NOTES
Assessment:      ICD-10-CM    1. Hammertoe of right foot  M20.41 CANCELED: XR Foot Bilateral G/E 3 Views             Plan:      Pt is non ambulatory do not recommend surgery    Lift up in chair see LAZARO Whittaker DPM                              Chief Complaint:     Patient presents with:  Consult: Bl curved great toes       HPI:  Arnold Duke is a 58 year old year old male who presents for foot concern.      Past Medical & Surgical History:  Past Medical History:   Diagnosis Date     Hydrocephalus (H)       Past Surgical History:   Procedure Laterality Date     BIOPSY       CYSTOSCOPY       GASTROSTOMY, INSERT TUBE, COMBINED       IMPLANT SHUNT VENTRICULOPERITONEAL      due to hydrocephalus from meningitis     OPEN REDUCTION INTERNAL FIXATION FEMUR MIDSHAFT Left 09/30/2022    Procedure: Open reduction internal fixation left distal femur fracture;  Surgeon: Mely Singh MD;  Location:  OR     PICC  11/21/2014           No family history on file.     Social History:  ?  History   Smoking Status     Former   Smokeless Tobacco     Never     History   Drug Use Not on file     Social History    Substance and Sexual Activity      Alcohol use: Not on file      Allergies:  ?   No Known Allergies     Medications:    Current Outpatient Medications   Medication     acetaminophen (TYLENOL) 325 MG tablet     ibuprofen (ADVIL/MOTRIN) 600 MG tablet     Lubricants (LUBRICATING JELLY EX)     sulfamethoxazole-trimethoprim (BACTRIM DS) 800-160 MG tablet     vitamin C (ASCORBIC ACID) 1000 MG TABS     VITAMIN D, CHOLECALCIFEROL, PO     aspirin 81 MG EC tablet     No current facility-administered medications for this visit.       Physical Exam:    Vitals:  [unfilled]  General:  WD/WN, in NAD.  A&O x3.  Dermatologic:  Skin is intact, open lesions absent.   Skin texture, turgor is abnormal, atrophic.  Vascular:  Pulses palpable bilateral.  Digital capillary refill time normal bilateral.  Skin temperature is  normal bilateral.  Generalized edema- none bilateral.  Neurologic:    Gross sensation unable to test.  Gait and balance abnormal, parapalegia.  Musculoskeletal:  Parapalegia  Deformity present - hallux malleus R

## 2023-04-14 ENCOUNTER — OFFICE VISIT (OUTPATIENT)
Dept: PODIATRY | Facility: CLINIC | Age: 58
End: 2023-04-14
Payer: MEDICARE

## 2023-04-14 VITALS
WEIGHT: 175 LBS | SYSTOLIC BLOOD PRESSURE: 100 MMHG | DIASTOLIC BLOOD PRESSURE: 67 MMHG | HEART RATE: 76 BPM | BODY MASS INDEX: 25.92 KG/M2 | HEIGHT: 69 IN

## 2023-04-14 DIAGNOSIS — M20.41 HAMMERTOE OF RIGHT FOOT: Primary | ICD-10-CM

## 2023-04-14 PROCEDURE — 99203 OFFICE O/P NEW LOW 30 MIN: CPT | Performed by: PODIATRIST

## 2023-04-14 NOTE — PATIENT INSTRUCTIONS
PATIENT INSTRUCTIONS - Podiatry / Foot & Ankle Surgery    Hallux hammertoe right foot    I would recommend no surgery on the foot - lift yourself up in the chair so feet don't catch on the floor

## 2023-04-14 NOTE — LETTER
4/14/2023         RE: Arnold Duke  [address]  [facility] [address]        Dear Colleague,    Thank you for referring your patient, Arnold Duke, to the [facility]. Please see a copy of my visit note below.    Assessment:      ICD-10-CM    1. Hammertoe of right foot  M20.41 CANCELED: XR Foot Bilateral G/E 3 Views             Plan:      Pt is non ambulatory do not recommend surgery    Lift up in chair see AVS        Mya Whittaker DPM                              Chief Complaint:     Patient presents with:  Consult: Bl curved great toes       HPI:  Arnold Duke is a 58 year old year old male who presents for foot concern.      Past Medical & Surgical History:  Past Medical History:   Diagnosis Date     Hydrocephalus (H)       Past Surgical History:   Procedure Laterality Date     BIOPSY       CYSTOSCOPY       GASTROSTOMY, INSERT TUBE, COMBINED       IMPLANT SHUNT VENTRICULOPERITONEAL      due to hydrocephalus from meningitis     OPEN REDUCTION INTERNAL FIXATION FEMUR MIDSHAFT Left 09/30/2022    Procedure: Open reduction internal fixation left distal femur fracture;  Surgeon: Mely Singh MD;  Location:  OR     [facility]  11/21/2014           No family history on file.     Social History:  ?  History   Smoking Status     Former   Smokeless Tobacco     Never     History   Drug Use Not on file     Social History    Substance and Sexual Activity      Alcohol use: Not on file      Allergies:  ?   No Known Allergies     Medications:    Current Outpatient Medications   Medication     acetaminophen (TYLENOL) 325 MG tablet     ibuprofen (ADVIL/MOTRIN) 600 MG tablet     Lubricants (LUBRICATING JELLY EX)     sulfamethoxazole-trimethoprim (BACTRIM DS) 800-160 MG tablet     vitamin C (ASCORBIC ACID) 1000 MG TABS     VITAMIN D, CHOLECALCIFEROL, PO     aspirin 81 MG EC tablet     No current facility-administered medications for this visit.       Physical Exam:     Vitals:  [unfilled]  General:  WD/WN, in NAD.  A&O x3.  Dermatologic:  Skin is intact, open lesions absent.   Skin texture, turgor is abnormal, atrophic.  Vascular:  Pulses palpable bilateral.  Digital capillary refill time normal bilateral.  Skin temperature is normal bilateral.  Generalized edema- none bilateral.  Neurologic:    Gross sensation unable to test.  Gait and balance abnormal, parapalegia.  Musculoskeletal:  Parapalegia  Deformity present - hallux malleus R            Again, thank you for allowing me to participate in the care of your patient.        Sincerely,        Mya Whittaker DPM

## 2023-04-14 NOTE — NURSING NOTE
"Chief Complaint   Patient presents with     Consult     Bl curved great toes       Initial /67   Pulse 76   Ht 1.753 m (5' 9\")   Wt 79.4 kg (175 lb)   BMI 25.84 kg/m   Estimated body mass index is 25.84 kg/m  as calculated from the following:    Height as of this encounter: 1.753 m (5' 9\").    Weight as of this encounter: 79.4 kg (175 lb).  Medications and allergies reviewed.      Padmini PIZARRO MA    "

## 2023-04-24 ENCOUNTER — VIRTUAL VISIT (OUTPATIENT)
Dept: UROLOGY | Facility: CLINIC | Age: 58
End: 2023-04-24
Payer: MEDICARE

## 2023-04-24 VITALS — HEIGHT: 69 IN | BODY MASS INDEX: 25.92 KG/M2 | WEIGHT: 175 LBS

## 2023-04-24 DIAGNOSIS — R97.20 ELEVATED PROSTATE SPECIFIC ANTIGEN (PSA): ICD-10-CM

## 2023-04-24 DIAGNOSIS — Z78.9 DIFFICULTY MANAGING URINARY CATHETER: ICD-10-CM

## 2023-04-24 DIAGNOSIS — N39.0 URINARY TRACT INFECTION WITHOUT HEMATURIA, SITE UNSPECIFIED: ICD-10-CM

## 2023-04-24 DIAGNOSIS — N31.9 NEUROGENIC BLADDER: Primary | ICD-10-CM

## 2023-04-24 PROCEDURE — 99214 OFFICE O/P EST MOD 30 MIN: CPT | Mod: VID | Performed by: UROLOGY

## 2023-04-24 ASSESSMENT — PAIN SCALES - GENERAL: PAINLEVEL: MODERATE PAIN (4)

## 2023-04-24 NOTE — LETTER
4/24/2023       RE: Arnold Duke  [address]  [facility] [address]     Dear Colleague,    Thank you for referring your patient, Arnold Duke, to the [facility] [place] at [facility]. Please see a copy of my visit note below.    ** Patient will meet you in My Chart    Arnold is a 58 year old who is being evaluated via a billable video visit.      How would you like to obtain your AVS? MyChart  If the video visit is dropped, the invitation should be resent by: Text to cell phone: [phone]  Will anyone else be joining your video visit? No        Office Visit Note  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  He has neurogenic bladder and bowel and performs self intermittent catheterization.  He says he actually has only been performing self-catheterization since about 5 years ago.  He has a prior history of urinary tract infection as well.    He was referred to urology for elevated PSA.  His PSA was 6.02 in February and then 7.8 in April.  He spoke with Benito Petty in our clinic and had a urinalysis checked.  That urinalysis showed a urinary tract infection.  He says he has been having cloudy urine with a foul odor.  He has been taking antibiotics and says that the odor and clarity have cleared up in the urine.  Plans have not yet been made for PSA recheck.    He also states that he has been having increased difficulty catheterizing for the past 2 months.      PAST MEDICAL HISTORY:   Past Medical History:   Diagnosis Date    Hydrocephalus (H)        PAST SURGICAL HISTORY:   Past Surgical History:   Procedure Laterality Date    BIOPSY      CYSTOSCOPY      GASTROSTOMY, INSERT TUBE, COMBINED      IMPLANT SHUNT VENTRICULOPERITONEAL      due to hydrocephalus from  meningitis    OPEN REDUCTION INTERNAL FIXATION FEMUR MIDSHAFT Left 09/30/2022    Procedure: Open reduction internal fixation left distal femur fracture;  Surgeon: Mely Singh MD;  Location: [facility] OR    PICC  11/21/2014            FAMILY HISTORY: History reviewed. No pertinent family history.    SOCIAL HISTORY:   Social History     Tobacco Use    Smoking status: Former    Smokeless tobacco: Never   Vaping Use    Vaping status: Not on file   Substance Use Topics    Alcohol use: Not on file       REVIEW OF SYSTEMS:  Skin: No rash, pruritis, or skin pigmentation  Eyes: No changes in vision  Ears/Nose/Throat: No changes in hearing, no nosebleeds  Respiratory: No shortness of breath, dyspnea on exertion, cough, or hemoptysis  Cardiovascular: No chest pain or palpitations  Gastrointestinal: No diarrhea or constipation. No abdominal pain. No hematochezia  Genitourinary: see HPI  Musculoskeletal: No pain or swelling of joints, normal range of motion  Neurologic: No weakness or tremors  Psychiatric: No recent changes in memory or mood  Hematologic/Lymphatic/Immunologic: No easy bruising or enlarged lymph nodes  Endocrine: No weight gain or loss      PHYSICAL EXAM:    General: Alert and oriented to time, place, and self. In NAD   HEENT: Head AT/NC, EOMI, CN Grossly intact   Lungs: no respiratory distress, or pursed lip breathing   Heart: No obvious jugular venous distension present   Musculoskeltal: Normal movements. Normal appearing musculature  Skin: no suspicious lesions or rashes   Neuro: Alert, oriented, speech and mentation normal; moving all 4 extremities equally.   Psych: affect and mood normal    Imaging: None    Urinalysis: UA RESULTS:  Recent Labs   Lab Test 04/06/23  1544 09/29/22  2228   COLOR Yellow Yellow   APPEARANCE Clear Clear   URINEGLC Negative Negative   URINEBILI Negative Negative   URINEKETONE Negative Trace*   SG 1.020 1.025   UBLD Trace* Negative   URINEPH 7.5* 6.0   PROTEIN Negative 20*    UROBILINOGEN 0.2  --    NITRITE Negative Negative   LEUKEST Moderate* Trace*   RBCU  --  4*   WBCU  --  4       PSA: 7.8    Post Void Residual:     Other labs: None today      IMPRESSION:  Urinary tract infection  Neurogenic bladder  Elevated PSA  Difficulty catheterizing    PLAN:  He has an elevated PSA but this could be a false positive due to his urinary tract infection.  I recommended that we get him back to clinic to check the urine to make certain the infection is gone and then recheck his PSA.  He agreed to this plan.  Of note, he has a stimulator that would not allow him to have an MRI of the prostate.    We also discussed his difficulty catheterizing.  It is unclear as to what is causing this but I recommended an office cystoscopy for further evaluation.  I will see him back for evaluation with office cystoscopy when we check his PSA.      Celestine James M.D.              Video-Visit Details    Type of service:  Video Visit   Video Start Time: 11:20 AM  Video End Time:11:33 AM    Originating Location (pt. Location): Home    Distant Location (provider location):  On-site  Platform used for Video Visit: Louie

## 2023-04-24 NOTE — PROGRESS NOTES
** Patient will meet you in My Chart    Arnold is a 58 year old who is being evaluated via a billable video visit.      How would you like to obtain your AVS? Honestly Nowharmoziy  If the video visit is dropped, the invitation should be resent by: Text to cell phone: [phone]  Will anyone else be joining your video visit? 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  He has neurogenic bladder and bowel and performs self intermittent catheterization.  He says he actually has only been performing self-catheterization since about 5 years ago.  He has a prior history of urinary tract infection as well.    He was referred to urology for elevated PSA.  His PSA was 6.02 in February and then 7.8 in April.  He spoke with Benito Petty in our clinic and had a urinalysis checked.  That urinalysis showed a urinary tract infection.  He says he has been having cloudy urine with a foul odor.  He has been taking antibiotics and says that the odor and clarity have cleared up in the urine.  Plans have not yet been made for PSA recheck.    He also states that he has been having increased difficulty catheterizing for the past 2 months.      PAST MEDICAL HISTORY:   Past Medical History:   Diagnosis Date     Hydrocephalus (H)        PAST SURGICAL HISTORY:   Past Surgical History:   Procedure Laterality Date     BIOPSY       CYSTOSCOPY       GASTROSTOMY, INSERT TUBE, COMBINED       IMPLANT SHUNT VENTRICULOPERITONEAL      due to hydrocephalus from meningitis     OPEN REDUCTION INTERNAL FIXATION FEMUR MIDSHAFT Left 09/30/2022    Procedure: Open reduction internal fixation left distal femur fracture;  Surgeon: Mely Singh MD;  [facility]  [facility]     [facility]            FAMILY HISTORY: History reviewed. No pertinent family  history.    SOCIAL HISTORY:   Social History     Tobacco Use     Smoking status: Former     Smokeless tobacco: Never   Vaping Use     Vaping status: Not on file   Substance Use Topics     Alcohol use: Not on file       REVIEW OF SYSTEMS:  Skin: No rash, pruritis, or skin pigmentation  Eyes: No changes in vision  Ears/Nose/Throat: No changes in hearing, no nosebleeds  Respiratory: No shortness of breath, dyspnea on exertion, cough, or hemoptysis  Cardiovascular: No chest pain or palpitations  Gastrointestinal: No diarrhea or constipation. No abdominal pain. No hematochezia  Genitourinary: see HPI  Musculoskeletal: No pain or swelling of joints, normal range of motion  Neurologic: No weakness or tremors  Psychiatric: No recent changes in memory or mood  Hematologic/Lymphatic/Immunologic: No easy bruising or enlarged lymph nodes  Endocrine: No weight gain or loss      PHYSICAL EXAM:    General: Alert and oriented to time, place, and self. In NAD   HEENT: Head AT/NC, EOMI, CN Grossly intact   Lungs: no respiratory distress, or pursed lip breathing   Heart: No obvious jugular venous distension present   Musculoskeltal: Normal movements. Normal appearing musculature  Skin: no suspicious lesions or rashes   Neuro: Alert, oriented, speech and mentation normal; moving all 4 extremities equally.   Psych: affect and mood normal    Imaging: None    Urinalysis: UA RESULTS:  Recent Labs   Lab Test 04/06/23  1544 09/29/22  2228   COLOR Yellow Yellow   APPEARANCE Clear Clear   URINEGLC Negative Negative   URINEBILI Negative Negative   URINEKETONE Negative Trace*   SG 1.020 1.025   UBLD Trace* Negative   URINEPH 7.5* 6.0   PROTEIN Negative 20*   UROBILINOGEN 0.2  --    NITRITE Negative Negative   LEUKEST Moderate* Trace*   RBCU  --  4*   WBCU  --  4       PSA: 7.8    Post Void Residual:     Other labs: None today      IMPRESSION:  Urinary tract infection  Neurogenic bladder  Elevated PSA  Difficulty catheterizing    PLAN:  He has an  elevated PSA but this could be a false positive due to his urinary tract infection.  I recommended that we get him back to clinic to check the urine to make certain the infection is gone and then recheck his PSA.  He agreed to this plan.  Of note, he has a stimulator that would not allow him to have an MRI of the prostate.    We also discussed his difficulty catheterizing.  It is unclear as to what is causing this but I recommended an office cystoscopy for further evaluation.  I will see him back for evaluation with office cystoscopy when we check his PSA.      Celestine James M.D.              Video-Visit Details    Type of service:  Video Visit   Video Start Time: 11:20 AM  Video End Time:11:33 AM    Originating Location (pt. Location): Home    Distant Location (provider location):  On-site  Platform used for Video Visit: Louie

## 2023-05-12 DIAGNOSIS — M80.852D: Primary | ICD-10-CM

## 2023-10-01 ENCOUNTER — HEALTH MAINTENANCE LETTER (OUTPATIENT)
Age: 58
End: 2023-10-01

## 2023-10-31 ENCOUNTER — TELEPHONE (OUTPATIENT)
Dept: UROLOGY | Facility: CLINIC | Age: 58
End: 2023-10-31
Payer: MEDICARE

## 2023-10-31 DIAGNOSIS — R39.89 SUSPECTED UTI: Primary | ICD-10-CM

## 2023-10-31 NOTE — TELEPHONE ENCOUNTER
Called patient and informed that he needs to leave a urine sample. Explained that results take 2-3 days to come back if positive to find out what antibiotic patient should start on. Patient plans to leave a sample on Friday and a lab appointment was made for patient. Patient couldn't come in the next few days per patient.     Gena Swain LPN

## 2023-10-31 NOTE — TELEPHONE ENCOUNTER
[facility] Call Center    Phone Message    May a detailed message be left on voicemail: yes     Reason for Call: Other: pt calling and sure he has a UTI and is looking for antibotic, please advise     Action Taken: Other: urology    Travel Screening: Not Applicable

## 2023-11-06 ENCOUNTER — LAB (OUTPATIENT)
Dept: LAB | Facility: CLINIC | Age: 58
End: 2023-11-06
Payer: MEDICARE

## 2023-11-06 DIAGNOSIS — R39.89 SUSPECTED UTI: ICD-10-CM

## 2023-11-06 LAB
ALBUMIN UR-MCNC: NEGATIVE MG/DL
APPEARANCE UR: ABNORMAL
BILIRUB UR QL STRIP: NEGATIVE
COLOR UR AUTO: YELLOW
GLUCOSE UR STRIP-MCNC: NEGATIVE MG/DL
HGB UR QL STRIP: NEGATIVE
KETONES UR STRIP-MCNC: NEGATIVE MG/DL
LEUKOCYTE ESTERASE UR QL STRIP: ABNORMAL
NITRATE UR QL: NEGATIVE
PH UR STRIP: 7 [PH] (ref 5–7)
SP GR UR STRIP: 1.01 (ref 1–1.03)
UROBILINOGEN UR STRIP-ACNC: 0.2 E.U./DL

## 2023-11-06 PROCEDURE — 87088 URINE BACTERIA CULTURE: CPT

## 2023-11-06 PROCEDURE — 81003 URINALYSIS AUTO W/O SCOPE: CPT | Mod: QW

## 2023-11-06 PROCEDURE — 87186 SC STD MICRODIL/AGAR DIL: CPT

## 2023-11-06 PROCEDURE — 87086 URINE CULTURE/COLONY COUNT: CPT

## 2023-11-08 LAB
BACTERIA UR CULT: ABNORMAL
BACTERIA UR CULT: ABNORMAL

## 2023-11-09 ENCOUNTER — TELEPHONE (OUTPATIENT)
Dept: UROLOGY | Facility: CLINIC | Age: 58
End: 2023-11-09
Payer: MEDICARE

## 2023-11-09 DIAGNOSIS — N39.0 URINARY TRACT INFECTION: Primary | ICD-10-CM

## 2023-11-09 RX ORDER — SULFAMETHOXAZOLE/TRIMETHOPRIM 800-160 MG
1 TABLET ORAL 2 TIMES DAILY
Qty: 10 TABLET | Refills: 0 | Status: SHIPPED | OUTPATIENT
Start: 2023-11-09 | End: 2023-11-14

## 2023-11-09 NOTE — TELEPHONE ENCOUNTER
[facility] Call Center    Phone Message    May a detailed message be left on voicemail: yes     Reason for Call: Other: pt returning call has another question about the medication, is their a difference between a UTI and Bladder infection, please advise     Action Taken: Other: urology    Travel Screening: Not Applicable

## 2024-02-26 NOTE — TELEPHONE ENCOUNTER
Well-controlled, continue current medications   Patient was seen for a fractured femur.  He states he was told he couldn't stay at a facility due to not having the correct commode.  Patient was sent home.  Provider told him if he isnt able to tolerate home to call back and they can assist in getting him to rehab.    Patient called and states without the extension he is unable to continue to care for himself.  He is hoping to get into a rehab for 5 days or so and willing to bring his commode.    Patient is requesting the clinic to reach out to discuss.    Will route to clinic to follow up.    Ginny Thomas RN   09/28/22 9:27 PM  [facility] Nurse Advisor      Reason for Disposition    [1] Caller requesting NON-URGENT health information AND [2] PCP's office is the best resource    Additional Information    Negative: [1] Caller is not with the adult (patient) AND [2] reporting urgent symptoms    Negative: Lab result questions    Negative: Medication questions    Negative: Caller can't be reached by phone    Negative: Caller has already spoken to PCP or another triager    Negative: RN needs further essential information from caller in order to complete triage    Negative: Requesting regular office appointment    Protocols used: INFORMATION ONLY CALL - NO TRIAGE-A-

## 2024-10-15 NOTE — PLAN OF CARE
Goal Outcome Evaluation:    VS: Refused vitals   O2: Stable on RA. Denies chest pain or SOB   Output: Self caths independently    Last BM: 10/2   Activity: Wheelchair (para), self transfers   Skin: L leg surgical incisions   Pain: Managed with scheduled Tylenol and PRN Oxycodone   CMS: A&Ox4. Denies N/T   Dressing: Ace wrap, CDI    Diet: Regular    LDA: PIV - SL   Equipment: Personal belongings, straight cath kits, call light within reach    Plan: Discharge home tomorrow, waiting on leg extension piece for wheelchair    Additional Info:                          15-Oct-2024 14:23

## 2024-11-24 ENCOUNTER — HEALTH MAINTENANCE LETTER (OUTPATIENT)
Age: 59
End: 2024-11-24

## (undated) DEVICE — ESU PENCIL W/SMOKE EVAC NEPTUNE STRYKER 0703-046-000

## (undated) DEVICE — IMPLANTABLE DEVICE
Type: IMPLANTABLE DEVICE | Site: LEG | Status: NON-FUNCTIONAL
Removed: 2022-09-30

## (undated) DEVICE — Device

## (undated) DEVICE — DRILL BIT STRK 4.3X216MM LOCKING SHORT 705043

## (undated) DEVICE — SPONGE LAP 18X18" X8435

## (undated) DEVICE — LINEN ORTHO PACK 5446

## (undated) DEVICE — ADH SKIN CLOSURE PREMIERPRO EXOFIN 1.0ML 3470

## (undated) DEVICE — CAST PADDING 6" STERILE 9046S

## (undated) DEVICE — SOL NACL 0.9% IRRIG 1000ML BOTTLE 2F7124

## (undated) DEVICE — SUCTION MANIFOLD NEPTUNE 2 SYS 4 PORT 0702-020-000

## (undated) DEVICE — PREP DURAPREP 26ML APL 8630

## (undated) DEVICE — BNDG ELASTIC 6" DBL LENGTH UNSTERILE 6611-16

## (undated) DEVICE — DRAPE C-ARM W/STRAPS 42X72" 07-CA104

## (undated) DEVICE — DRSG ABDOMINAL 07 1/2X8" 7197D

## (undated) DEVICE — STRAP KNEE/BODY 31143004

## (undated) DEVICE — SU MONOCRYL 3-0 PS-2 18" UND Y497G

## (undated) DEVICE — GLOVE PROTEXIS W/NEU-THERA 7.5  2D73TE75

## (undated) DEVICE — SU MONOCRYL 2-0 SH 27" UND Y417H

## (undated) DEVICE — SOL WATER IRRIG 1000ML BOTTLE 2F7114

## (undated) DEVICE — GLOVE PROTEXIS POWDER FREE 7.5 ORTHOPEDIC 2D73ET75

## (undated) DEVICE — DRILL BIT STRK 3.2X216MM NON-LOCKING SHORT 705032

## (undated) DEVICE — DRSG TEGADERM 4X4 3/4" 1626W

## (undated) DEVICE — SPECIMEN CONTAINER 5OZ STERILE 2600SA

## (undated) DEVICE — KIT CULTURE TRANSPORT SYS A.C.T. II DUAL ANEROBE R124022

## (undated) DEVICE — GOWN IMPERVIOUS SPECIALTY XLG/XLONG 32474

## (undated) DEVICE — DRAPE C-ARMOR 5 SIDED 5523

## (undated) DEVICE — ESU GROUND PAD ADULT W/CORD E7507

## (undated) RX ORDER — OXYCODONE HYDROCHLORIDE 5 MG/1
TABLET ORAL
Status: DISPENSED
Start: 2022-09-30

## (undated) RX ORDER — FENTANYL CITRATE 50 UG/ML
INJECTION, SOLUTION INTRAMUSCULAR; INTRAVENOUS
Status: DISPENSED
Start: 2022-09-30

## (undated) RX ORDER — FENTANYL CITRATE-0.9 % NACL/PF 10 MCG/ML
PLASTIC BAG, INJECTION (ML) INTRAVENOUS
Status: DISPENSED
Start: 2022-09-30

## (undated) RX ORDER — ONDANSETRON 2 MG/ML
INJECTION INTRAMUSCULAR; INTRAVENOUS
Status: DISPENSED
Start: 2022-09-30

## (undated) RX ORDER — PROPOFOL 10 MG/ML
INJECTION, EMULSION INTRAVENOUS
Status: DISPENSED
Start: 2022-09-30

## (undated) RX ORDER — VANCOMYCIN HYDROCHLORIDE 1 G/20ML
INJECTION, POWDER, LYOPHILIZED, FOR SOLUTION INTRAVENOUS
Status: DISPENSED
Start: 2022-09-30

## (undated) RX ORDER — LIDOCAINE HYDROCHLORIDE 20 MG/ML
INJECTION, SOLUTION EPIDURAL; INFILTRATION; INTRACAUDAL; PERINEURAL
Status: DISPENSED
Start: 2022-09-30

## (undated) RX ORDER — DEXAMETHASONE SODIUM PHOSPHATE 4 MG/ML
INJECTION, SOLUTION INTRA-ARTICULAR; INTRALESIONAL; INTRAMUSCULAR; INTRAVENOUS; SOFT TISSUE
Status: DISPENSED
Start: 2022-09-30

## (undated) RX ORDER — CEFAZOLIN SODIUM/WATER 2 G/20 ML
SYRINGE (ML) INTRAVENOUS
Status: DISPENSED
Start: 2022-09-30